# Patient Record
Sex: FEMALE | Race: BLACK OR AFRICAN AMERICAN | NOT HISPANIC OR LATINO | URBAN - METROPOLITAN AREA
[De-identification: names, ages, dates, MRNs, and addresses within clinical notes are randomized per-mention and may not be internally consistent; named-entity substitution may affect disease eponyms.]

---

## 2017-08-11 ENCOUNTER — HOSPITAL ENCOUNTER (OUTPATIENT)
Dept: ULTRASOUND IMAGING | Facility: CLINIC | Age: 27
Discharge: HOME | End: 2017-08-11
Attending: OTOLARYNGOLOGY | Admitting: OTOLARYNGOLOGY
Payer: COMMERCIAL

## 2017-08-11 DIAGNOSIS — Z32.01 POSITIVE PREGNANCY TEST: ICD-10-CM

## 2017-08-11 PROCEDURE — T1013 SIGN LANG/ORAL INTERPRETER: HCPCS | Mod: U3

## 2017-08-11 PROCEDURE — 76801 OB US < 14 WKS SINGLE FETUS: CPT

## 2017-08-24 ENCOUNTER — HOSPITAL ENCOUNTER (OUTPATIENT)
Facility: CLINIC | Age: 27
End: 2017-08-24
Payer: COMMERCIAL

## 2017-09-07 ENCOUNTER — TRANSFERRED RECORDS (OUTPATIENT)
Dept: HEALTH INFORMATION MANAGEMENT | Facility: CLINIC | Age: 27
End: 2017-09-07

## 2017-09-07 ENCOUNTER — MEDICAL CORRESPONDENCE (OUTPATIENT)
Dept: HEALTH INFORMATION MANAGEMENT | Facility: CLINIC | Age: 27
End: 2017-09-07

## 2017-09-08 ENCOUNTER — TELEPHONE (OUTPATIENT)
Dept: ENDOCRINOLOGY | Facility: CLINIC | Age: 27
End: 2017-09-08

## 2017-09-08 ENCOUNTER — DOCUMENTATION ONLY (OUTPATIENT)
Dept: ENDOCRINOLOGY | Facility: CLINIC | Age: 27
End: 2017-09-08

## 2017-09-08 NOTE — TELEPHONE ENCOUNTER
Referral no records received from [facility]  to be seen in Endocrinology  for High risk first trimester with  Nontoxic  Single thyroid nodule . Dr Cee will view for scheduling options.

## 2017-09-08 NOTE — TELEPHONE ENCOUNTER
----- Message from Cecilia Cee MD sent at 9/8/2017  1:35 PM CDT -----  Regarding: RE: Call pt- new referral from [facility]  Contact: [phone]  I have now reviewed what they sent us which doesn't include a physical exam, only the conclusion there is a thyroid nodule.  Would it be possible to contact them and ask them to get a a thyroid US and send us results to help us triage in the setting of our clinic booking out months?    Cecilia Cee       ----- Message -----     From: Cecilia Cee MD     Sent: 9/8/2017  12:25 PM       To: Cecilia Cee MD, #  Subject: FW: Call pt- new referral from [facility]             So sorry my mistake Dr Feliciano has now informed me he is the consult attending next week and he has blocked the Thursday  biopsy clinic as well as other clinics. This changes things for where this patient should be relative to my recommendaiton she got to [facility] biopsy clinic 9/14 AM.    Can you get me records to establish appropriate triage under the circumstances? Specifically if there has been an US or thyroid labs I would want to see those results.   Thanks    Cecilia Cee          ----- Message -----     From: Cecilia Cee MD     Sent: 9/8/2017  11:51 AM       To: Zelda Weathers, RN, #  Subject: RE: Call pt- new referral from [facility]               I am being told by Katie that he is on call next week so not doing the biopsy clinic .   ----- Message -----     From: Cecilia Cee MD     Sent: 9/8/2017  11:38 AM       To: Zelda Weathers, RN, #  Subject: RE: Call pt- new referral from [facility]             Schedule to [facility] biopsy clinic 9/14/17 .    ----- Message -----     From: Zelda Weathers RN     Sent: 9/8/2017  11:31 AM       To: Cecilia Cee MD  Subject: FW: Call pt- new referral from [facility]             New referral high risk  first  trimester and  Nontoxic single thyroid nodule  No records just the referral from  [facility]  ----- Message  -----     From: Red Wright     Sent: 9/8/2017  10:58 AM       To: [facility] Triage-  Subject: Call pt- new referral from [facility]                 Pt has a new referral to be seen for thyroid nodule, and she is pregnant. [facility] faxed the referral over referred by Alycia Rosenberg. Please review and call pt for scheduling. She can be reached at [phone]. Thank you!      GY    Please DO NOT send this message and/or reply back to sender.  Call Center Representatives DO NOT respond to messages.

## 2017-09-08 NOTE — PROGRESS NOTES
"Outside records reviewed for appt triage purposes    8/17/17: TSH 1.01    9/7/17 provider note states gestation 9 weeks 6 days.  There is no exam on the note.   The diagnosis is nontoxic autonomous thyroid nodule.   On the consult it says \"thyroid nodule on right side\".     Cecilia Cee MD  "

## 2017-09-08 NOTE — TELEPHONE ENCOUNTER
I spoke with referring clinic.  they will order a thyroid U/S and  Fax results for shceduling purpose

## 2017-09-08 NOTE — TELEPHONE ENCOUNTER
----- Message from Red Wright sent at 9/8/2017 10:58 AM CDT -----  Regarding: Call pt- new referral from [facility]  Contact: [phone]  Pt has a new referral to be seen for thyroid nodule, and she is pregnant. [facility] faxed the referral over referred by Alycia Rosenberg. Please review and call pt for scheduling. She can be reached at [phone]. Thank you!      GY    Please DO NOT send this message and/or reply back to sender.  Call Center Representatives DO NOT respond to messages.

## 2017-09-11 ENCOUNTER — TELEPHONE (OUTPATIENT)
Dept: ENDOCRINOLOGY | Facility: CLINIC | Age: 27
End: 2017-09-11

## 2017-09-11 NOTE — TELEPHONE ENCOUNTER
Braden/ Gautam  will be doing the biopsy Thursday at 10 AM as  the  was going to take the day off  to bring her in.

## 2017-09-11 NOTE — TELEPHONE ENCOUNTER
----- Message from Mirta Feliciano MD sent at 9/11/2017 12:20 PM CDT -----  Regarding: RE: BIOPSY CLINIC THURSDAY  That's fine.   Ally and I will be doing this biopsy.   Mirta Feliciano MD  [id number]  Endocrinology Service    ----- Message -----     From: Zelda Weathers RN     Sent: 9/11/2017  11:10 AM       To: Mirta Feliciano MD  Subject: BIOPSY CLINIC THURSDAY                           The  is planning on  bringing her Thursday at 10 AM for the biopsy clinic.  Miscommunication .  He took off work. I know  The clinic was cancelled  but since Jose is up  with  Schedule WED and Friday  this week we probably wont schedule any add ons for you . I know we won't .  Could you see her Thursday at 10 ?

## 2017-09-11 NOTE — TELEPHONE ENCOUNTER
----- Message from Katie Payan sent at 9/11/2017 12:55 PM CDT -----  ----- Message -----     From: Zelda Weathers, RN     Sent: 9/11/2017  12:48 PM       To: Katie Payan  Subject: FW: BIOPSY CLINIC THURSDAY                       Jennadanay brodie Florez will do the  Biopsy Thursday  10 AM  . Please schedule   ----- Message -----     From: Mirta Feliciano MD     Sent: 9/11/2017  12:20 PM       To: Zelda Waethers, RN  Subject: RE: BIOPSY CLINIC THURSDAY                       That's fine.   Ally and I will be doing this biopsy.   Mirta Feliciano MD  [id number]  Endocrinology Service    ----- Message -----     From: Zelad Weathers, RN     Sent: 9/11/2017  11:10 AM       To: Mirta Feliciano MD  Subject: BIOPSY CLINIC THURSDAY                           The  is planning on  bringing her Thursday at 10 AM for the biopsy clinic.    He took off work. Could you do  Thursday at 10 ?

## 2017-09-11 NOTE — TELEPHONE ENCOUNTER
Jeni  spouse will bring her here  Thursday  10 AM   to Dr Feliciano biopsy clinic   Per Dr Feliciano.

## 2017-09-14 ENCOUNTER — OFFICE VISIT (OUTPATIENT)
Dept: ENDOCRINOLOGY | Facility: CLINIC | Age: 27
End: 2017-09-14

## 2017-09-14 VITALS
HEART RATE: 76 BPM | HEIGHT: 67 IN | WEIGHT: 110.2 LBS | DIASTOLIC BLOOD PRESSURE: 69 MMHG | BODY MASS INDEX: 17.3 KG/M2 | SYSTOLIC BLOOD PRESSURE: 103 MMHG

## 2017-09-14 DIAGNOSIS — E04.1 THYROID NODULE: ICD-10-CM

## 2017-09-14 DIAGNOSIS — E04.1 THYROID NODULE: Primary | ICD-10-CM

## 2017-09-14 LAB
T3 SERPL-MCNC: 120 NG/DL (ref 60–181)
T4 FREE SERPL-MCNC: 1.24 NG/DL (ref 0.76–1.46)
TSH SERPL DL<=0.005 MIU/L-ACNC: 0.35 MU/L (ref 0.4–4)

## 2017-09-14 PROCEDURE — 86376 MICROSOMAL ANTIBODY EACH: CPT | Performed by: INTERNAL MEDICINE

## 2017-09-14 PROCEDURE — 84480 ASSAY TRIIODOTHYRONINE (T3): CPT | Performed by: INTERNAL MEDICINE

## 2017-09-14 PROCEDURE — 86800 THYROGLOBULIN ANTIBODY: CPT | Performed by: INTERNAL MEDICINE

## 2017-09-14 RX ORDER — PRENATAL VIT/IRON FUM/FOLIC AC 27MG-0.8MG
1 TABLET ORAL DAILY
COMMUNITY

## 2017-09-14 ASSESSMENT — PAIN SCALES - GENERAL: PAINLEVEL: NO PAIN (0)

## 2017-09-14 NOTE — PROGRESS NOTES
Addendum:   TSH suppressed in mid trimester pregnancy with thyroid nodule 1.6 cm with microcal.   Will need FNA but with suppressed TSH, we will plan to follow the levels and obtain FNA if normal values are seen. Clinically euthyroid.     Mirta Feliciano MD  [id number]  Endocrinology Service  ===================================.     Endocrine Clinic Consult:     Reason for consult: Hypothyroidism  Date: September 14, 2017  Referring Physician: [facility]    HPI:   Jeni Aguila is a 27 year old female who presented with her  and 2-year-old son is seen for initial consultation.      Since currently 3 months pregnant and is here today for evaluation of thyroid nodule.    Jeni is otherwise healthy.  During routine visit for pregnancy, primary doctor thyroid nodule on right side and therefore was sent for referral. Prior to this, she had never noticed thyroid nodules.    There is no history of neck pain, goiter, difficulty swallowing, change in voice.   She denies having family history of thyroid cancer or goiter, no history of radiation exposure, thyroid autoimmunity. She is originally from [place] [facility] [place] and has not noted goiter to be endemic in the area.     Clinical symptom assessment:   Temperature: cold intolerance No. Feels warm in hot weather.    General:  No,  Normal energy levels  Sleep  Is normal.   Weight gain No  Neurological: Difficult with concentration / somnolence: None  CVS: Palpitation No  History of CAD No  Lipid disorder No    Respiratory   Shortness of breath No    GI   Constipation No    Skin and hair:  No changes.    Extremity Swelling No  Menstrual irregularity:   Last menstrual period was about 3 months ago  Musculoskeletal:  No pain or cramps    Other ROS:   No eye symptoms  No headache, change in vision, loss of peripheral vision  Complete ROS that were obtained were negative.     Past medical history   no significant past medical  Illnesses   no surgeries     family history   no family  "history of thyroid disease or autoimmune diseases   no history of thyroid cancer in the family     Social history  She has children aged 10, 7, 2.  She lives with her .   She does not smoke or drink.    No past medical history on file.  No past surgical history on file.  No family history on file.  Social History     Social History     Marital status:      Spouse name: N/A     Number of children: N/A     Years of education: N/A     Occupational History     Not on file.     Social History Main Topics     Smoking status: Not on file     Smokeless tobacco: Not on file     Alcohol use Not on file     Drug use: Not on file     Sexual activity: Not on file     Other Topics Concern     Not on file     Social History Narrative     No narrative on file      No Known Allergies  Current Outpatient Prescriptions   Medication     Prenatal Vit-Fe Fumarate-FA (PRENATAL MULTIVITAMIN PLUS IRON) 27-0.8 MG TABS per tablet     No current facility-administered medications for this visit.            Exam:  /69  Pulse 76  Ht 1.702 m (5' 7\")  Wt 50 kg (110 lb 3.2 oz)  BMI 17.26 kg/m2   Constitutional:   thin female, no acute distress.   Head: Normocephalic. No masses, lesions, tenderness or abnormalities   eyes:    Extraocular movements are intact.  Peripheral vision grossly intact.   Neck:  No cervical lymphadenopathy, thyroid exam showed right-sided 1.5 cm nodule, firm, Carotids without bruits.  ENT: No throat congestion, no neck nodes or sinus tenderness  Cardiovascular: regular rhythm, no tachycardia  Respiratory: Lungs clear  Gastrointestinal: Abdomen soft, non-tender. BS normal. No striae  Musculoskeletal: gait normal and normal muscle tone  Neurologic: Normal speech, reflexes normal.   Psychiatric: mentation appears normal       TSH   Date Value Ref Range Status   09/14/2017 0.35 (L) 0.40 - 4.00 mU/L Final       T4 Free   Date Value Ref Range Status   09/14/2017 1.24 0.76 - 1.46 ng/dL Final   ]     thyroid " ultrasound:  I reviewed the images.   Isoechoic; 1.6 cm nodule on the right inferior nodule.   Well demarcated. Partially cystic.   Possible microcal and grade 3 vascularity  Intermediate suspicion.     Head and neck ultrasound did not show  Any evidence of  Abnormal lymphadenopathy.     Assessment      Multiple thyroid nodule with dominant right sided nodule with possible microcalcifications.   Abnormal TFTs -- suppressed TSH    Jeni Aguila is a 27 year old years old female who is here for evaluation of  Right-sided thyroid nodule.  This was found during a physical examination.   Clinically, she is euthyroid. We obtained a thyroid function test that showed mildly suppressed TSH with normal TT3 and FT4. During pregnancy, it is unclear whether this would represent subclinical hyperthyroidism.     She was found to have with thyroid nodule with intermediate suspicion for thyroid cancer.  We discussed about treatment nodules including biopsy.  However, there is data that suggests that if we delay management of nodules until after delivery, the outcomes are not different. We discussed the risk of cancers in thyroid nodules and having biopsy as next steps (prior to getting US)     We will discuss the findings of thyroid ultrasound and plan for next steps based on patient's preference. Given the low TSH, and reported 12 week + pregnancy, we will follow patient closely and likely defer FNA until after delivery. The reason is to rule out toxic nodule.     We discussed about thyroid nodules and management in great detail. Majority of thyroid nodules are benign. Majority of thyroid cancers are slow growing and have good prognosis but a small number tend to be aggressive.   Thyroid FNA for further evaluation of larger nodules is next best step.     Mirta Feliciano MD  [id number]  Endocrinology Service      Patient Instructions   Lab testing today, we will follow up with the results.     Please schedule for thyroid ultrasound.               Orders Placed This Encounter   Procedures     US Thyroid     US Head Neck Soft Tissue     TSH with free T4 reflex     T4 free     T3 total     Thyroid peroxidase antibody     Anti thyroglobulin antibody

## 2017-09-14 NOTE — LETTER
9/14/2017       RE: Jeni Aguila  [address]  [facility] [address]     Dear Colleague,    Thank you for referring your patient, Jeni Aguila, to the [facility] ENDOCRINOLOGY at [facility]. Please see a copy of my visit note below.    9 7 2 child age.     Endocrine Clinic Consult:     Reason for consult: Hypothyroidism  Date: September 14, 2017  Referring Physician: [facility]    HPI:   Jeni Aguila is a 27 year old female who presented with her  and 2-year-old son is seen for initial consultation.      Since currently 3 months pregnant and is here today for evaluation of thyroid nodule.    Jeni is otherwise healthy.  During routine visit for pregnancy, primary doctor thyroid nodule on right side and therefore was sent for referral. Prior to this, she had never noticed thyroid nodules.    There is no history of neck pain, goiter, difficulty swallowing, change in voice.   She denies having family history of thyroid cancer or goiter, no history of radiation exposure, thyroid autoimmunity. She is originally from [facility] [place] and has not noted goiter to be endemic in the area.     Clinical symptom assessment:   Temperature: cold intolerance No. Feels warm in hot weather.    General:  No,  Normal energy levels  Sleep  Is normal.   Weight gain No  Neurological: Difficult with concentration / somnolence: None  CVS: Palpitation No  History of CAD No  Lipid disorder No    Respiratory   Shortness of breath No    GI   Constipation No    Skin and hair:  No changes.    Extremity Swelling No  Menstrual irregularity:   Last menstrual period was about 3 months ago  Musculoskeletal:  No pain or cramps    Other ROS:   No eye symptoms  No headache, change in vision, loss of peripheral vision  Complete ROS that were obtained were negative.     Past medical history   no significant past medical  Illnesses   no surgeries     family history   no family history of thyroid disease or  "autoimmune diseases   no history of thyroid cancer in the family     Social history  She has children aged 10, 7, 2.  She lives with her .   She does not smoke or drink.    No past medical history on file.  No past surgical history on file.  No family history on file.  Social History     Social History     Marital status:      Spouse name: N/A     Number of children: N/A     Years of education: N/A     Occupational History     Not on file.     Social History Main Topics     Smoking status: Not on file     Smokeless tobacco: Not on file     Alcohol use Not on file     Drug use: Not on file     Sexual activity: Not on file     Other Topics Concern     Not on file     Social History Narrative     No narrative on file      No Known Allergies  Current Outpatient Prescriptions   Medication     Prenatal Vit-Fe Fumarate-FA (PRENATAL MULTIVITAMIN PLUS IRON) 27-0.8 MG TABS per tablet     No current facility-administered medications for this visit.      Exam:  /69  Pulse 76  Ht 1.702 m (5' 7\")  Wt 50 kg (110 lb 3.2 oz)  BMI 17.26 kg/m2   Constitutional:   thin female, no acute distress.   Head: Normocephalic. No masses, lesions, tenderness or abnormalities   eyes:    Extraocular movements are intact.  Peripheral vision grossly intact.   Neck:  No cervical lymphadenopathy, thyroid exam showed right-sided 1.5 cm nodule, firm, Carotids without bruits.  ENT: No throat congestion, no neck nodes or sinus tenderness  Cardiovascular: regular rhythm, no tachycardia  Respiratory: Lungs clear  Gastrointestinal: Abdomen soft, non-tender. BS normal. No striae  Musculoskeletal: gait normal and normal muscle tone  Neurologic: Normal speech, reflexes normal.   Psychiatric: mentation appears normal     TSH   Date Value Ref Range Status   09/14/2017 0.35 (L) 0.40 - 4.00 mU/L Final     T4 Free   Date Value Ref Range Status   09/14/2017 1.24 0.76 - 1.46 ng/dL Final      thyroid ultrasound:  I reviewed the images. "   Isoechoic; 1.6 cm nodule on the right inferior nodule.   Well demarcated. Partially cystic.   Possible microcal and grade 3 vascularity  Intermediate suspicion.     Head and neck ultrasound did not show  Any evidence of  Abnormal lymphadenopathy.     Assessment      Multiple thyroid nodule with dominant right sided nodule with possible microcalcifications.   Abnormal TFTs -- suppressed TSH    Jeni Aguila is a 27 year old years old female who is here for evaluation of  Right-sided thyroid nodule.  This was found during a physical examination.   Clinically, she is euthyroid. We obtained a thyroid function test that showed mildly suppressed TSH with normal TT3 and FT4. During pregnancy, it is unclear whether this would represent subclinical hyperthyroidism.     She was found to have with thyroid nodule with intermediate suspicion for thyroid cancer.  We discussed about treatment nodules including biopsy.  However, there is data that suggests that if we delay management of nodules until after delivery, the outcomes are not different. We discussed the risk of cancers in thyroid nodules and having biopsy as next steps (prior to getting US)     We will discuss the findings of thyroid ultrasound and plan for next steps based on patient's preference. Given the low TSH, and reported 12 week + pregnancy, we will follow patient closely and likely defer FNA until after delivery. The reason is to rule out toxic nodule.     We discussed about thyroid nodules and management in great detail. Majority of thyroid nodules are benign. Majority of thyroid cancers are slow growing and have good prognosis but a small number tend to be aggressive.   Thyroid FNA for further evaluation of larger nodules is next best step.     Mirta Feliciano MD  [id number]  Endocrinology Service    Patient Instructions   Lab testing today, we will follow up with the results.     Please schedule for thyroid ultrasound.              Orders Placed This Encounter    Procedures     US Thyroid     US Head Neck Soft Tissue     TSH with free T4 reflex     T4 free     T3 total     Thyroid peroxidase antibody     Anti thyroglobulin antibody       Again, thank you for allowing me to participate in the care of your patient.      Sincerely,    Mirta Feliciano MD

## 2017-09-14 NOTE — MR AVS SNAPSHOT
After Visit Summary   2017    Jeni Aguila    MRN: [id number]           Patient Information     Date Of Birth          1990        Visit Information        Provider Department      2017 10:00 AM Maria Esther Lozoya Rupendra Dev Thaku, MD [facility]        Today's Diagnoses     Thyroid nodule    -  1      Care Instructions    Lab testing today, we will follow up with the results.     Please schedule for thyroid ultrasound.               Follow-ups after your visit        Follow-up notes from your care team     Return in about 3 months (around 2017).      Who to contact     Please call your clinic at [phone] to:    Ask questions about your health    Make or cancel appointments    Discuss your medicines    Learn about your test results    Speak to your doctor   If you have compliments or concerns about an experience at your clinic, or if you wish to file a complaint, please contact [facility] Patient Relations at [phone] or email us at [email or web address]         Additional Information About Your Visit        MyChart Information     [employer] is an electronic gateway that provides easy, online access to your medical records. With [employer], you can request a clinic appointment, read your test results, renew a prescription or communicate with your care team.     To sign up for Mojeek visit the website at [email or web address]   You will be asked to enter the access code listed below, as well as some personal information. Please follow the directions to create your username and password.     Your access code is: [email or web address]  Expires: 2017  6:30 AM     Your access code will  in 90 days. If you need help or a new code, please contact your [facility] or call [phone] for assistance.        Care EveryWhere ID     This is your Care EveryWhere ID. This could be used by other  "organizations to access your [facility] medical records  [id number]        Your Vitals Were     Pulse Height BMI (Body Mass Index)             76 1.702 m (5' 7\") 17.26 kg/m2          Blood Pressure from Last 3 Encounters:   09/14/17 103/69    Weight from Last 3 Encounters:   09/14/17 50 kg (110 lb 3.2 oz)               Primary Care Provider Office Phone # Fax #    Clinic [facility] [phone]       [address] [facility] [address]        Equal Access to Services     CATIE WRIGHT : Hadii aad ku hadasho Soomaali, waaxda luqadaha, qaybta kaalmada adeegyada, waxay idiin hayaajuliana overton . So [facility] [phone].    ATENCIÓN: Si habla español, tiene a del rosario disposición servicios gratuitos de asistencia lingüística. Llame al [phone].    We comply with applicable federal civil rights laws and [place] laws. We do not discriminate on the basis of race, color, national origin, age, disability sex, sexual orientation or gender identity.            Thank you!     Thank you for choosing [facility] ENDOCRINOLOGY  for your care. Our goal is always to provide you with excellent care. Hearing back from our patients is one way we can continue to improve our services. Please take a few minutes to complete the written survey that you may receive in the mail after your visit with us. Thank you!             Your Updated Medication List - Protect others around you: Learn how to safely use, store and throw away your medicines at www.disposemymeds.org.          This list is accurate as of: 9/14/17  5:00 PM.  Always use your most recent med list.                   Brand Name Dispense Instructions for use Diagnosis    prenatal multivitamin plus iron 27-0.8 MG Tabs per tablet      Take 1 tablet by mouth daily          "

## 2017-09-14 NOTE — PATIENT INSTRUCTIONS
Lab testing today, we will follow up with the results.     Please schedule for thyroid ultrasound.

## 2017-09-15 LAB
THYROGLOB AB SERPL IA-ACNC: <20 IU/ML (ref 0–40)
THYROPEROXIDASE AB SERPL-ACNC: <10 IU/ML

## 2018-04-03 ENCOUNTER — THERAPY VISIT (OUTPATIENT)
Dept: PHYSICAL THERAPY | Facility: CLINIC | Age: 28
End: 2018-04-03
Payer: COMMERCIAL

## 2018-04-03 DIAGNOSIS — M54.2 CERVICALGIA: Primary | ICD-10-CM

## 2018-04-03 PROCEDURE — 97530 THERAPEUTIC ACTIVITIES: CPT | Mod: GP | Performed by: PHYSICAL THERAPIST

## 2018-04-03 PROCEDURE — 97110 THERAPEUTIC EXERCISES: CPT | Mod: GP | Performed by: PHYSICAL THERAPIST

## 2018-04-03 PROCEDURE — 97161 PT EVAL LOW COMPLEX 20 MIN: CPT | Mod: GP | Performed by: PHYSICAL THERAPIST

## 2018-04-03 NOTE — LETTER
[facility]  [address] [place] [id number] [phone]    2018    Re: Heike Musa   :   1990  MRN:  [id number]   REFERRING PHYSICIAN:   Gina Sanchez    [facility]  Date of Initial Evaluation: 4/3/18  Visits:  Rxs Used: 1  Reason for Referral:  Cervicalgia    Physical Therapy Initial Examination/Evaluation April 3, 2018   Heike Musa is a 28 year old female referred to physical therapy by Dr. Gina Sanchez for treatment of Back pain with Precautions/Restrictions/MD instructions E&T; Upper back pain since , long periods of neck flexion at work    Therapist Assessment:   Clinical Impression: Pt presents to [facility] [place] with primary complaint of neck pain.  Per clinical examination, pt with pain at end range of cervical motion and demonstrates postural dysfunction.   Pt will benefit from skilled physical therapy for stretching and postural strengthening program as well as education on ergonomics for work station.   Subjective: Pt reports pain is present around front of chest, wrapping around to mid thoracic area. Pain has been present since . 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 Pain is common between shoulder blades  DOI/onset: MD order 3/5/2018   Mechanism of injury: prolonged neck flexion with job   DOS NA   Related PMH: chronic neck pain  Previous treatment: PT   Imaging: none   Chief Complaint: Neck Pain    Pain: rest 6 /10, activity 8/10  Described as: Steady,  Sharp Alleviated by: heat pack Exacerbated by: Looking down (like on phone) or on computer, lifting Progression of symptoms since initial onset: Staying the same or worsening Time of day when pain is worse: Later in the day with fatigue   Sleeping: Does not wake from pain    Social history: Lives with parents & siter  Occupation: none due to pain; may return to job pending  symptoms Job duties: household tasks   Current HEP/exercise regimen: some neck stretching, small leg exercises   Re: Heike Musa   :   1990    Patient's goals are Decrease pain, go back to work    Other pertinent PMH: none noted General health as reported by patient: Good   Return to MD: prn      Cervical Spine Evaluation  Posture  Forward Head: WNL  Rounded Shoulders: present  Thoracic Spine:Decreased curvature    Range of Motion  Flexion: WNL with pain   Extension: 48 pain   Sidebend Left: 53  Right: 34  Rotation Left: 68  Right: 57  Protraction: head rests in this position   Retraction: Good motion with repeated movement  Upper extremity screen: WNL     Upper Extremity Strength  Shoulder MMT Flexion Sc[facility]ion ER IR   Left 4/5 4/5 4/5 4/5   Right 4/5 4/5 4/5 4/5     Special Tests  Spurling's: slight increased pain   Dermatomes: WNL  Myotomes: WNL    Palpation  Pain along upper trap and cervical paraspinals    Assessment/Plan:  Patient is a 28 year old female with cervical complaints.    Patient has the following significant findings with corresponding treatment plan.                Diagnosis 1:  Neck pain   Pain -  hot/cold therapy, manual therapy, self management, education and home program  Decreased strength - therapeutic exercise, therapeutic activities and home program  Impaired muscle performance - neuro re-education and home program  Decreased function - therapeutic activities and home program  Impaired posture - neuro re-education, therapeutic activities and home program    Therapy Evaluation Codes:   1) History comprised of:   Personal factors that impact the plan of care:      Living environment, Past/current experiences and Time since onset of symptoms.    Comorbidity factors that impact the plan of care are:      Weakness.     Medications impacting care: None.          Re: Heike Musa   :   1990    2) Examination of Body Systems comprised of:   Body structures and functions that impact the  plan of care:      Cervical spine and Thoracic Spine.   Activity limitations that impact the plan of care are:      Bathing, Cooking, Dressing, Lifting, Reading/Computer work and Working.  3) Clinical presentation characteristics are:   Stable/Uncomplicated.  4) Decision-Making    Low complexity using standardized patient assessment instrument and/or measureable assessment of functional outcome.  Cumulative Therapy Evaluation is: Low complexity.    Previous and current functional limitations:  (See Goal Flow Sheet for this information)    Short term and Long term goals: (See Goal Flow Sheet for this information)     Communication ability:  Patient has an  for communication clarity.  Treatment Explanation - The following has been discussed with the patient:   RX ordered/plan of care  Anticipated outcomes  Possible risks and side effects  This patient would benefit from PT intervention to resume normal activities.   Rehab potential is good.    Frequency:  1 X week, once daily  Duration:  for 6 weeks  Discharge Plan:  Achieve all LTG.  Independent in home treatment program.  Reach maximal therapeutic benefit.    Please refer to the daily flowsheet for treatment today, total treatment time and time spent performing 1:1 timed codes.     Thank you for your referral.    INQUIRIES  Therapist: Liane Loo, PT, DPT   [facility] [address] [address]  Phone: [phone]  Fax: [phone]

## 2018-04-03 NOTE — PROGRESS NOTES
HPI  System  Physical Exam  General   ROS      Physical Therapy Initial Examination/Evaluation April 3, 2018   Heike Musa is a 28 year old female referred to physical therapy by Dr. Gina Sanchez for treatment of Back pain with Precautions/Restrictions/MD instructions E&T; Upper back pain since 2014, long periods of neck flexion at work    Therapist Assessment:   Clinical Impression: Pt presents to [facility] [place] with primary complaint of neck pain.  Per clinical examination, pt with pain at end range of cervical motion and demonstrates postural dysfunction.   Pt will benefit from skilled physical therapy for stretching and postural strengthening program as well as education on ergonomics for work station.      Subjective: Pt reports pain is present around front of chest, wrapping around to mid thoracic area. Pain has been present since 2014. 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 Pain is common between shoulder blades  DOI/onset: MD order 3/5/2018   Mechanism of injury: prolonged neck flexion with job   DOS NA   Related PMH: chronic neck pain  Previous treatment: PT   Imaging: none   Chief Complaint: Neck Pain    Pain: rest 6 /10, activity 8/10  Described as: Steady,  Sharp Alleviated by: heat pack Exacerbated by: Looking down (like on phone) or on computer, lifting Progression of symptoms since initial onset: Staying the same or worsening Time of day when pain is worse: Later in the day with fatigue   Sleeping: Does not wake from pain    Social history: Lives with parents & siter  Occupation: none due to pain; may return to job pending symptoms Job duties: household tasks   Current HEP/exercise regimen: some neck stretching, small leg exercises   Patient's goals are Decrease pain, go back to work    Other pertinent PMH: none noted General health as reported by patient: Good   Return to MD: prn      Cervical Spine  Evaluation    Posture  Forward Head: WNL  Rounded Shoulders: present  Thoracic Spine:Decreased curvature    Range of Motion  Flexion: WNL with pain   Extension: 48 pain   Sidebend Left: 53  Right: 34  Rotation Left: 68  Right: 57  Protraction: head rests in this position   Retraction: Good motion with repeated movement  Upper extremity screen: WNL         Upper Extremity Strength  Shoulder MMT Flexion Scaption ER IR   Left 4/5 4/5 4/5 4/5   Right 4/5 4/5 4/5 4/5     Special Tests  Spurling's: slight increased pain   Dermatomes: WNL  Myotomes: WNL      Palpation  Pain along upper trap and cervical paraspinals    Assessment/Plan:  Patient is a 28 year old female with cervical complaints.    Patient has the following significant findings with corresponding treatment plan.                Diagnosis 1:  Neck pain   Pain -  hot/cold therapy, manual therapy, self management, education and home program  Decreased strength - therapeutic exercise, therapeutic activities and home program  Impaired muscle performance - neuro re-education and home program  Decreased function - therapeutic activities and home program  Impaired posture - neuro re-education, therapeutic activities and home program    Therapy Evaluation Codes:   1) History comprised of:   Personal factors that impact the plan of care:      Living environment, Past/current experiences and Time since onset of symptoms.    Comorbidity factors that impact the plan of care are:      Weakness.     Medications impacting care: None.  2) Examination of Body Systems comprised of:   Body structures and functions that impact the plan of care:      Cervical spine and Thoracic Spine.   Activity limitations that impact the plan of care are:      Bathing, Cooking, Dressing, Lifting, Reading/Computer work and Working.  3) Clinical presentation characteristics are:   Stable/Uncomplicated.  4) Decision-Making    Low complexity using standardized patient assessment instrument and/or  measureable assessment of functional outcome.  Cumulative Therapy Evaluation is: Low complexity.    Previous and current functional limitations:  (See Goal Flow Sheet for this information)    Short term and Long term goals: (See Goal Flow Sheet for this information)     Communication ability:  Patient has an  for communication clarity.  Treatment Explanation - The following has been discussed with the patient:   RX ordered/plan of care  Anticipated outcomes  Possible risks and side effects  This patient would benefit from PT intervention to resume normal activities.   Rehab potential is good.    Frequency:  1 X week, once daily  Duration:  for 6 weeks  Discharge Plan:  Achieve all LTG.  Independent in home treatment program.  Reach maximal therapeutic benefit.    Please refer to the daily flowsheet for treatment today, total treatment time and time spent performing 1:1 timed codes.

## 2018-04-13 ENCOUNTER — THERAPY VISIT (OUTPATIENT)
Dept: PHYSICAL THERAPY | Facility: CLINIC | Age: 28
End: 2018-04-13
Payer: COMMERCIAL

## 2018-04-13 DIAGNOSIS — M54.2 CERVICALGIA: ICD-10-CM

## 2018-04-13 PROCEDURE — 97110 THERAPEUTIC EXERCISES: CPT | Mod: GP | Performed by: PHYSICAL THERAPIST

## 2018-04-13 PROCEDURE — 97140 MANUAL THERAPY 1/> REGIONS: CPT | Mod: GP | Performed by: PHYSICAL THERAPIST

## 2018-04-20 ENCOUNTER — THERAPY VISIT (OUTPATIENT)
Dept: PHYSICAL THERAPY | Facility: CLINIC | Age: 28
End: 2018-04-20
Payer: COMMERCIAL

## 2018-04-20 DIAGNOSIS — M54.2 CERVICALGIA: ICD-10-CM

## 2018-04-20 PROCEDURE — 97110 THERAPEUTIC EXERCISES: CPT | Mod: GP | Performed by: PHYSICAL THERAPIST

## 2018-04-20 PROCEDURE — 97140 MANUAL THERAPY 1/> REGIONS: CPT | Mod: GP | Performed by: PHYSICAL THERAPIST

## 2018-04-20 PROCEDURE — 97112 NEUROMUSCULAR REEDUCATION: CPT | Mod: GP | Performed by: PHYSICAL THERAPIST

## 2018-07-30 ENCOUNTER — HOSPITAL ENCOUNTER (EMERGENCY)
Facility: CLINIC | Age: 28
Discharge: HOME | End: 2018-07-30
Attending: EMERGENCY MEDICINE | Admitting: EMERGENCY MEDICINE
Payer: COMMERCIAL

## 2018-07-30 ENCOUNTER — APPOINTMENT (OUTPATIENT)
Dept: GENERAL RADIOLOGY | Facility: CLINIC | Age: 28
End: 2018-07-30
Attending: EMERGENCY MEDICINE
Payer: COMMERCIAL

## 2018-07-30 VITALS
WEIGHT: 131.06 LBS | OXYGEN SATURATION: 98 % | RESPIRATION RATE: 16 BRPM | SYSTOLIC BLOOD PRESSURE: 107 MMHG | HEART RATE: 55 BPM | TEMPERATURE: 97.9 F | DIASTOLIC BLOOD PRESSURE: 66 MMHG

## 2018-07-30 DIAGNOSIS — M54.9 MUSCULOSKELETAL BACK PAIN: ICD-10-CM

## 2018-07-30 LAB
ALBUMIN UR-MCNC: NEGATIVE MG/DL
APPEARANCE UR: CLEAR
BILIRUB UR QL STRIP: NEGATIVE
COLOR UR AUTO: YELLOW
GLUCOSE UR STRIP-MCNC: NEGATIVE MG/DL
HCG UR QL: NEGATIVE
HGB UR QL STRIP: ABNORMAL
KETONES UR STRIP-MCNC: NEGATIVE MG/DL
LEUKOCYTE ESTERASE UR QL STRIP: ABNORMAL
NITRATE UR QL: NEGATIVE
PH UR STRIP: 5.5 PH (ref 5–7)
RBC #/AREA URNS AUTO: 128 /HPF (ref 0–2)
SOURCE: ABNORMAL
SP GR UR STRIP: 1 (ref 1–1.03)
SQUAMOUS #/AREA URNS AUTO: <1 /HPF (ref 0–1)
UROBILINOGEN UR STRIP-MCNC: NORMAL MG/DL (ref 0–2)
WBC #/AREA URNS AUTO: 10 /HPF (ref 0–5)

## 2018-07-30 PROCEDURE — 81025 URINE PREGNANCY TEST: CPT | Performed by: EMERGENCY MEDICINE

## 2018-07-30 PROCEDURE — 99284 EMERGENCY DEPT VISIT MOD MDM: CPT | Mod: Z6 | Performed by: EMERGENCY MEDICINE

## 2018-07-30 PROCEDURE — 99284 EMERGENCY DEPT VISIT MOD MDM: CPT | Mod: 25 | Performed by: EMERGENCY MEDICINE

## 2018-07-30 PROCEDURE — 25000132 ZZH RX MED GY IP 250 OP 250 PS 637: Performed by: EMERGENCY MEDICINE

## 2018-07-30 PROCEDURE — 71046 X-RAY EXAM CHEST 2 VIEWS: CPT

## 2018-07-30 PROCEDURE — 81001 URINALYSIS AUTO W/SCOPE: CPT | Performed by: EMERGENCY MEDICINE

## 2018-07-30 RX ORDER — CYCLOBENZAPRINE HCL 10 MG
10 TABLET ORAL ONCE
Status: COMPLETED | OUTPATIENT
Start: 2018-07-30 | End: 2018-07-30

## 2018-07-30 RX ORDER — HYDROCODONE BITARTRATE AND ACETAMINOPHEN 5; 325 MG/1; MG/1
1 TABLET ORAL EVERY 6 HOURS PRN
Qty: 12 TABLET | Refills: 0 | Status: SHIPPED | OUTPATIENT
Start: 2018-07-30 | End: 2019-12-10

## 2018-07-30 RX ORDER — CYCLOBENZAPRINE HCL 10 MG
10 TABLET ORAL 2 TIMES DAILY PRN
Qty: 12 TABLET | Refills: 0 | Status: SHIPPED | OUTPATIENT
Start: 2018-07-30 | End: 2018-08-05

## 2018-07-30 RX ADMIN — CYCLOBENZAPRINE HYDROCHLORIDE 10 MG: 10 TABLET, FILM COATED ORAL at 11:11

## 2018-07-30 ASSESSMENT — ENCOUNTER SYMPTOMS
ABDOMINAL PAIN: 0
SHORTNESS OF BREATH: 0
COUGH: 0
BACK PAIN: 1
FEVER: 0
CHILLS: 0

## 2018-07-30 NOTE — ED AVS SNAPSHOT
[facility], Emergency Department    [address]    [place] [address]    Phone:  [phone]    Fax:  [phone]                                       Heike Musa   MRN: [id number]    Department:  [facility], Emergency Department   Date of Visit:  7/30/2018           Patient Information     Date Of Birth          1990        Your diagnoses for this visit were:     Musculoskeletal back pain        You were seen by Herminia Elizalde MD.        Discharge Instructions       You can take flexeril for muscle spasms and pain.     You can take vicodin for pain.  Do not take with tylenol since there is tylenol in vicodin.     Please follow up with your primary care doctor in 1 week for recheck if not improved.       Relieving Back Pain  Back pain is a common problem. You can strain back muscles by lifting too much weight or just by moving the wrong way. Back strain can be uncomfortable, even painful. And it can take weeks or months to improve. To help yourself feel better and prevent future back strains, try these tips.  Important Note: Do not give aspirin to children or teens without first discussing it with your healthcare provider.      ? Ice    Ice reduces muscle pain and swelling. It helps most during the first 24 to 48 hours after an injury.    Wrap an ice pack or a bag of frozen peas in a thin towel. (Never place ice directly on your skin.)    Place the ice where your back hurts the most.    Don t ice for more than 20 minutes at a time.    You can use ice several times a day.  ? Medicines  Over-the-counter pain relievers can include acetaminophen and anti-inflammatory medicines, which includes aspirin or ibuprofen. They can help ease discomfort. Some also reduce swelling.    Tell your healthcare provider about any medicines you are already taking.    Take medicines only as directed.  ? Heat  After the first 48 hours, heat can relax sore muscles and improve blood flow.    Try a warm bath or shower. Or  use a heating pad set on low. To prevent a burn, keep a cloth between you and the heating pad.    Don t use a heating pad for more than 15 minutes at a time. Never sleep on a heating pad.  Date Last Reviewed: 9/1/2015 2000-2017 [employer]. [address] [place] [address]. All rights reserved. This information is not intended as a substitute for professional medical care. Always follow your healthcare professional's instructions.              24 Hour Appointment Hotline       To make an appointment at any [facility] [place], call [phone] ([phone]). If you don't have a family doctor or clinic, we will help you find one. [facility] clinics are conveniently located to serve the needs of you and your family.             Review of your medicines      START taking        Dose / Directions Last dose taken    cyclobenzaprine 10 MG tablet   Commonly known as:  FLEXERIL   Dose:  10 mg   Quantity:  12 tablet        Take 1 tablet (10 mg) by mouth 2 times daily as needed for muscle spasms   Refills:  0        HYDROcodone-acetaminophen 5-325 MG per tablet   Commonly known as:  NORCO   Dose:  1 tablet   Quantity:  12 tablet        Take 1 tablet by mouth every 6 hours as needed for severe pain   Refills:  0                Information about OPIOIDS     PRESCRIPTION OPIOIDS: WHAT YOU NEED TO KNOW   We gave you an opioid (narcotic) pain medicine. It is important to manage your pain, but opioids are not always the best choice. You should first try all the other options your care team gave you. Take this medicine for as short a time (and as few doses) as possible.     These medicines have risks:    DO NOT drive when on new or higher doses of pain medicine. These medicines can affect your alertness and reaction times, and you could be arrested for driving under the influence (DUI). If you need to use opioids long-term, talk to your care team about driving.    DO NOT operate heave machinery    DO NOT do  any other dangerous activities while taking these medicines.     DO NOT drink any alcohol while taking these medicines.      If the opioid prescribed includes acetaminophen, DO NOT take with any other medicines that contain acetaminophen. Read all labels carefully. Look for the word  acetaminophen  or  Tylenol.  Ask your pharmacist if you have questions or are unsure.    You can get addicted to pain medicines, especially if you have a history of addiction (chemical, alcohol or substance dependence). Talk to your care team about ways to reduce this risk.    Store your pills in a secure place, locked if possible. We will not replace any lost or stolen medicine. If you don t finish your medicine, please throw away (dispose) as directed by your pharmacist. The [place] [employer]ution [employer] has more information about safe disposal: https://www.pca.[facility].[place].us/living-green/managing-unwanted-medications.     All opioids tend to cause constipation. Drink plenty of water and eat foods that have a lot of fiber, such as fruits, vegetables, prune juice, apple juice and high-fiber cereal. Take a laxative (Miralax, milk of magnesia, Colace, Senna) if you don t move your bowels at least every other day.         Prescriptions were sent or printed at these locations (2 Prescriptions)                   Other Prescriptions                Printed at Department/Unit printer (2 of 2)         cyclobenzaprine (FLEXERIL) 10 MG tablet               HYDROcodone-acetaminophen (NORCO) 5-325 MG per tablet                Procedures and tests performed during your visit     HCG qualitative urine (UPT)    UA reflex to Microscopic    XR Chest 2 Views      Orders Needing Specimen Collection     None      Pending Results     No orders found from 7/28/2018 to 7/31/2018.            Pending Culture Results     No orders found from 7/28/2018 to 7/31/2018.            Pending Results Instructions     If you had any lab results that were not finalized  "at the time of your Discharge, you can call the ED Lab Result RN at [phone]. You will be contacted by this team for any positive Lab results or changes in treatment. The nurses are available 7 days a week from 10A to 6:30P.  You can leave a message 24 hours per day and they will return your call.        Thank you for choosing [facility]       Thank you for choosing [facility] for your care. Our goal is always to provide you with excellent care. Hearing back from our patients is one way we can continue to improve our services. Please take a few minutes to complete the written survey that you may receive in the mail after you visit with us. Thank you!        [employer] Information     [employer] lets you send messages to your doctor, view your test results, renew your prescriptions, schedule appointments and more. To sign up, go to [email or web address] . Click on \"Log in\" on the left side of the screen, which will take you to the Welcome page. Then click on \"Sign up Now\" on the right side of the page.     You will be asked to enter the access code listed below, as well as some personal information. Please follow the directions to create your username and password.     Your access code is: [email or web address]  Expires: 10/28/2018  1:20 PM     Your access code will  in 90 days. If you need help or a new code, please call your [facility] clinic or [phone].        Care EveryWhere ID     This is your Care EveryWhere ID. This could be used by other organizations to access your [facility] medical records  [id number]        Equal Access to Services     CHARLY GASTELUM : Hadluisito Jules, waaxda luqadaha, qaybta kaalmada vianney cordova. So [facility] [phone].    ATENCIÓN: Si habla español, tiene a bashir disposición servicios gratuitos de asistencia lingüística. Llame al [phone].    We comply with applicable federal civil rights laws and [place] laws. We do not discriminate on " the basis of race, color, national origin, age, disability, sex, sexual orientation, or gender identity.            After Visit Summary       This is your record. Keep this with you and show to your community pharmacist(s) and doctor(s) at your next visit.

## 2018-07-30 NOTE — ED PROVIDER NOTES
History     Chief Complaint   Patient presents with     Back Pain     Mid bilateral back pain for 3 days.  Denies trauma     The history is provided by the patient. The history is limited by a language barrier. A  was used (Official [employer] I-pad ).     Heike Musa is a 28 year old female with a history of back pain who presents for acute on chronic back pain. Patient complains she has had sharp diffuse back pain for the past three days. She denies doing any heavy lifting, or other trauma/injury that could have exacerbated her back. She reports she has had similar back pain in the past, intermittently over the past 4 years, for which she has been through physical therapy and has been prescribed muscle relaxants. Patient reports her those interventions relieved her pain temporarily, however, her back pain has once again recurred. Her pain makes her want to cough, but she does not have a cough. She denies chest pain, shortness of breath, fever, chills, cold-type symptoms, or abdominal pain. No numbness, tingling, or weakness of the extremities. No loss of bowel or bladder control. No urinary symptoms or changes in bowel movements. She did take 1-2 tablets of ibuprofen for her pain yesterday without improvement. She denies any chance of pregnancy. No history of surgery.  She is currently menstruating.     I have reviewed the Medications, Allergies, Past Medical and Surgical History, and Social History in the [employer] system.  Past Medical History:   Diagnosis Date     Back pain        History reviewed. No pertinent surgical history.    No family history on file.    Social History   Substance Use Topics     Smoking status: Never Smoker     Smokeless tobacco: Never Used     Alcohol use No       No current facility-administered medications for this encounter.      Current Outpatient Prescriptions   Medication     cyclobenzaprine (FLEXERIL) 10 MG tablet     HYDROcodone-acetaminophen (NORCO)  5-325 MG per tablet      Allergies no known allergies    Review of Systems   Constitutional: Negative for chills and fever.   Respiratory: Negative for cough and shortness of breath.    Cardiovascular: Negative for chest pain.   Gastrointestinal: Negative for abdominal pain.   Musculoskeletal: Positive for back pain.   All other systems reviewed and are negative.      Physical Exam   BP: 131/77  Pulse: 66  Temp: 98.2  F (36.8  C)  Resp: 16  Weight: 59.4 kg (131 lb 1 oz)  SpO2: 100 %      Physical Exam   Constitutional: She is oriented to person, place, and time. She appears well-developed and well-nourished. No distress.   HENT:   Head: Normocephalic and atraumatic.   Right Ear: External ear normal.   Left Ear: External ear normal.   Nose: Nose normal.   Mouth/Throat: Oropharynx is clear and moist.   Eyes: EOM are normal. Pupils are equal, round, and reactive to light.   Neck: Normal range of motion. Neck supple.   Cardiovascular: Normal rate, regular rhythm, normal heart sounds and intact distal pulses.    Pulmonary/Chest: Effort normal and breath sounds normal.   Abdominal: Soft. Bowel sounds are normal.   Musculoskeletal:   paraspinous tenderness on exam.  Muscle feels tight.    Neurological: She is alert and oriented to person, place, and time.   Skin: Skin is warm and dry. She is not diaphoretic.   Psychiatric: She has a normal mood and affect.   Nursing note and vitals reviewed.      ED Course     ED Course     Procedures       10:30 AM  The patient was seen and examined by Dr. Elizalde in Room 18.        Labs Ordered and Resulted from Time of ED Arrival Up to the Time of Departure from the ED   URINE MACROSCOPIC WITH REFLEX TO MICRO - Abnormal; Notable for the following:        Result Value    Blood Urine Large (*)     Leukocyte Esterase Urine Trace (*)     RBC Urine 128 (*)     WBC Urine 10 (*)     All other components within normal limits   HCG QUALITATIVE URINE            Assessments & Plan (with Medical  Decision Making)   The patient presents for concerns of recurrent back pain which she has had on an off frequently. She says she has used muscle relaxants for awhile which help but then it always comes back. She denies any recent cause for the pain.  She says the pain makes her want to cough but she doesn't have a cough.  She is very concerned and what to know why she has this.  It is very difficult to reassure her.  Her lung exam and sats are normal.  She was given flexeril 10 mg po but she still has discomfort.  UA was checked.  She is not pregnant and does not appear to have a uti.  However, there is blood present but she is having her menses.  She is 0 for Wells criteria, making PE unlikely.  CXR was done and shows no cause for her pain, despite her continued concerns. I tried to reassure her.  I will discharge her home with meds for muscle pain and pmd follow up if needed.      I have reviewed the nursing notes.    I have reviewed the findings, diagnosis, plan and need for follow up with the patient.    Discharge Medication List as of 7/30/2018  1:20 PM      START taking these medications    Details   cyclobenzaprine (FLEXERIL) 10 MG tablet Take 1 tablet (10 mg) by mouth 2 times daily as needed for muscle spasms, Disp-12 tablet, R-0, Local Print      HYDROcodone-acetaminophen (NORCO) 5-325 MG per tablet Take 1 tablet by mouth every 6 hours as needed for severe pain, Disp-12 tablet, R-0, Local Print             Final diagnoses:   Musculoskeletal back pain   I, Chanelle Faustin, am serving as a trained medical scribe to document services personally performed by Herminia Elizalde MD, based on the provider's statements to me.   IHerminia MD, was physically present and have reviewed and verified the accuracy of this note documented by Chanelle Faustin.      7/30/2018   [facility], [place], EMERGENCY DEPARTMENT     Herminia Elizalde MD  08/01/18 [id number]

## 2018-07-30 NOTE — DISCHARGE INSTRUCTIONS
You can take flexeril for muscle spasms and pain.     You can take vicodin for pain.  Do not take with tylenol since there is tylenol in vicodin.     Please follow up with your primary care doctor in 1 week for recheck if not improved.       Relieving Back Pain  Back pain is a common problem. You can strain back muscles by lifting too much weight or just by moving the wrong way. Back strain can be uncomfortable, even painful. And it can take weeks or months to improve. To help yourself feel better and prevent future back strains, try these tips.  Important Note: Do not give aspirin to children or teens without first discussing it with your healthcare provider.      ? Ice    Ice reduces muscle pain and swelling. It helps most during the first 24 to 48 hours after an injury.    Wrap an ice pack or a bag of frozen peas in a thin towel. (Never place ice directly on your skin.)    Place the ice where your back hurts the most.    Don t ice for more than 20 minutes at a time.    You can use ice several times a day.  ? Medicines  Over-the-counter pain relievers can include acetaminophen and anti-inflammatory medicines, which includes aspirin or ibuprofen. They can help ease discomfort. Some also reduce swelling.    Tell your healthcare provider about any medicines you are already taking.    Take medicines only as directed.  ? Heat  After the first 48 hours, heat can relax sore muscles and improve blood flow.    Try a warm bath or shower. Or use a heating pad set on low. To prevent a burn, keep a cloth between you and the heating pad.    Don t use a heating pad for more than 15 minutes at a time. Never sleep on a heating pad.  Date Last Reviewed: 9/1/2015 2000-2017 [employer]. [address] [place] [address], [place] [address]. All rights reserved. This information is not intended as a substitute for professional medical care. Always follow your healthcare professional's instructions.

## 2018-07-30 NOTE — ED AVS SNAPSHOT
[facility], [place], Emergency Department    [address]    [facility] [address]    Phone:  [phone]    Fax:  [phone]                                       Heike Musa   MRN: [id number]    Department:  [facility], Emergency Department   Date of Visit:  7/30/2018           After Visit Summary Signature Page     I have received my discharge instructions, and my questions have been answered. I have discussed any challenges I see with this plan with the nurse or doctor.    ..........................................................................................................................................  Patient/Patient Representative Signature      ..........................................................................................................................................  Patient Representative Print Name and Relationship to Patient    ..................................................               ................................................  Date                                            Time    ..........................................................................................................................................  Reviewed by Signature/Title    ...................................................              ..............................................  Date                                                            Time

## 2019-01-01 ENCOUNTER — TRANSFERRED RECORDS (OUTPATIENT)
Dept: MULTI SPECIALTY CLINIC | Facility: CLINIC | Age: 29
End: 2019-01-01

## 2019-01-01 LAB — PAP SMEAR - HIM PATIENT REPORTED: NEGATIVE

## 2019-01-04 ENCOUNTER — MEDICAL CORRESPONDENCE (OUTPATIENT)
Dept: HEALTH INFORMATION MANAGEMENT | Facility: CLINIC | Age: 29
End: 2019-01-04

## 2019-01-16 ENCOUNTER — OFFICE VISIT (OUTPATIENT)
Dept: ENDOCRINOLOGY | Facility: CLINIC | Age: 29
End: 2019-01-16

## 2019-01-16 VITALS
WEIGHT: 128.4 LBS | HEART RATE: 69 BPM | BODY MASS INDEX: 20.11 KG/M2 | DIASTOLIC BLOOD PRESSURE: 73 MMHG | SYSTOLIC BLOOD PRESSURE: 106 MMHG

## 2019-01-16 DIAGNOSIS — E04.1 THYROID NODULE: Primary | ICD-10-CM

## 2019-01-16 ASSESSMENT — PAIN SCALES - GENERAL: PAINLEVEL: NO PAIN (0)

## 2019-01-16 NOTE — PROGRESS NOTES
Endocrine Progress note:   01/16/2019    Interval history:   Currently breast-feeding.  No new issues.  One cycle since delivery.  No neck pain, difficulty swallowing, voice change.  No heat intolerance Interval History    Symptoms Details   Temp intolerance no   Palpitations no   SOB no   Appetite No change   Weight changes No changes   Change in BM none   Diet No    Exercise no   Energy Levels good   Sleep No changes    Mental status change alert   Skin or hair changes No change   Extremity No edema   Menses 1 cycle only       Local  No neck pain   No difficulty swallowing.   No voice change               Assessment      Multiple thyroid nodule with dominant right sided nodule with possible microcalcifications diagnosed in 2018, while she was pregnant.   Abnormal TFTs -- suppressed TSH    Plan:   Thyroid ultrasound ordered to be done at the same time as FNA (to assess remaining thyroid)   We discussed about thyroid nodules and management in great detail. Majority of thyroid nodules are benign. Majority of thyroid cancers are slow growing and have good prognosis but a small number tend to be aggressive.   Thyroid FNA for further evaluation of larger nodules is next best step.   Obtain thyroid labs and FNA in radiology.     Mirta Feliciano MD  [id number]  Endocrinology Service    ===================================.     Endocrine Clinic Consult:     Reason for consult: Hypothyroidism  Referring Physician: [facility], Clinic    HPI:   Jeni Aguila is a 29 year old female for follow-up     Jeni bland was diagnosed with thyroid nodule on right side last year when she was 3 month pregnancy. Nodule was detected on exam.   FNA was delayed due to pregnancy. US showed intermediate suspicion nodule.     Other ROS:   No eye symptoms  No headache, change in vision, loss of peripheral vision  Complete ROS that were obtained were negative.     Past medical history   no significant past medical  Illnesses   no surgeries     family  history   no family history of thyroid disease or autoimmune diseases   no history of thyroid cancer in the family     Social history  She has children aged 11, 7 and 3 year old and an infant 7 month old.   She lives with her .   She does not smoke or drink.    History reviewed. No pertinent past medical history.  History reviewed. No pertinent surgical history.  History reviewed. No pertinent family history.  Social History     Socioeconomic History     Marital status:      Spouse name: Not on file     Number of children: Not on file     Years of education: Not on file     Highest education level: Not on file   Social Needs     Financial resource strain: Not on file     Food insecurity - worry: Not on file     Food insecurity - inability: Not on file     Transportation needs - medical: Not on file     Transportation needs - non-medical: Not on file   Occupational History     Not on file   Tobacco Use     Smoking status: Never Smoker     Smokeless tobacco: Never Used   Substance and Sexual Activity     Alcohol use: Not on file     Drug use: Not on file     Sexual activity: Not on file   Other Topics Concern     Not on file   Social History Narrative     Not on file      No Known Allergies  Current Outpatient Medications   Medication     Prenatal Vit-Fe Fumarate-FA (PRENATAL MULTIVITAMIN PLUS IRON) 27-0.8 MG TABS per tablet     No current facility-administered medications for this visit.            Exam:  /73   Pulse 69   Wt 58.2 kg (128 lb 6.4 oz)   BMI 20.11 kg/m     Constitutional:   thin female, no acute distress.   Head: Normocephalic. No masses, lesions, tenderness or abnormalities   eyes:    Extraocular movements are intact.  Peripheral vision grossly intact.   Neck:  No cervical lymphadenopathy, thyroid exam showed right-sided 1.5 cm nodule, firm, Carotids without bruits.  ENT: No throat congestion, no neck nodes or sinus tenderness  Cardiovascular: regular rhythm, no  tachycardia  Respiratory: Lungs clear  Gastrointestinal: Abdomen soft, non-tender. BS normal. No striae  Musculoskeletal: gait normal and normal muscle tone  Neurologic: Normal speech, reflexes normal.   Psychiatric: mentation appears normal       TSH   Date Value Ref Range Status   09/14/2017 0.35 (L) 0.40 - 4.00 mU/L Final       T4 Free   Date Value Ref Range Status   09/14/2017 1.24 0.76 - 1.46 ng/dL Final   ]     thyroid ultrasound:  I reviewed the images.   Isoechoic; 1.6 cm nodule on the right inferior nodule.   Well demarcated. Partially cystic.   Possible microcal and grade 3 vascularity  Intermediate suspicion.     Head and neck ultrasound did not show  Any evidence of  Abnormal lymphadenopathy.

## 2019-01-16 NOTE — PATIENT INSTRUCTIONS
Biopsy of the thyroid nodule is ordered along with thyroid function testing. We will report it once results are available.

## 2019-01-16 NOTE — LETTER
1/16/2019       RE: Jeni Aguila  [address] [place]  [facility] [address]     Dear Colleague,    Thank you for referring your patient, Jeni Aguila, to the [facility] ENDOCRINOLOGY at [facility]. Please see a copy of my visit note below.    Endocrine Progress note:   01/16/2019    Interval history:   Currently breast-feeding.  No new issues.  One cycle since delivery.  No neck pain, difficulty swallowing, voice change.  No heat intolerance Interval History    Symptoms Details   Temp intolerance no   Palpitations no   SOB no   Appetite No change   Weight changes No changes   Change in BM none   Diet No    Exercise no   Energy Levels good   Sleep No changes    Mental status change alert   Skin or hair changes No change   Extremity No edema   Menses 1 cycle only       Local  No neck pain   No difficulty swallowing.   No voice change               Assessment      Multiple thyroid nodule with dominant right sided nodule with possible microcalcifications diagnosed in 2018, while she was pregnant.   Abnormal TFTs -- suppressed TSH    Plan:   Thyroid ultrasound ordered to be done at the same time as FNA (to assess remaining thyroid)   We discussed about thyroid nodules and management in great detail. Majority of thyroid nodules are benign. Majority of thyroid cancers are slow growing and have good prognosis but a small number tend to be aggressive.   Thyroid FNA for further evaluation of larger nodules is next best step.   Obtain thyroid labs and FNA in radiology.     Mirta Feliciano MD  [id number]  Endocrinology Service    ===================================.     Endocrine Clinic Consult:     Reason for consult: Hypothyroidism  Referring Physician: [facility], Clinic    HPI:   Jeni Aguila is a 29 year old female for follow-up     Jeni bland was diagnosed with thyroid nodule on right side last year when she was 3 month pregnancy. Nodule was detected on exam.   FNA was delayed due to pregnancy.  US showed intermediate suspicion nodule.     Other ROS:   No eye symptoms  No headache, change in vision, loss of peripheral vision  Complete ROS that were obtained were negative.     Past medical history   no significant past medical  Illnesses   no surgeries     family history   no family history of thyroid disease or autoimmune diseases   no history of thyroid cancer in the family     Social history  She has children aged 11, 7 and 3 year old and an infant 7 month old.   She lives with her .   She does not smoke or drink.    History reviewed. No pertinent past medical history.  History reviewed. No pertinent surgical history.  History reviewed. No pertinent family history.  Social History     Socioeconomic History     Marital status:      Spouse name: Not on file     Number of children: Not on file     Years of education: Not on file     Highest education level: Not on file   Social Needs     Financial resource strain: Not on file     Food insecurity - worry: Not on file     Food insecurity - inability: Not on file     Transportation needs - medical: Not on file     Transportation needs - non-medical: Not on file   Occupational History     Not on file   Tobacco Use     Smoking status: Never Smoker     Smokeless tobacco: Never Used   Substance and Sexual Activity     Alcohol use: Not on file     Drug use: Not on file     Sexual activity: Not on file   Other Topics Concern     Not on file   Social History Narrative     Not on file      No Known Allergies  Current Outpatient Medications   Medication     Prenatal Vit-Fe Fumarate-FA (PRENATAL MULTIVITAMIN PLUS IRON) 27-0.8 MG TABS per tablet     No current facility-administered medications for this visit.            Exam:  /73   Pulse 69   Wt 58.2 kg (128 lb 6.4 oz)   BMI 20.11 kg/m      Constitutional:   thin female, no acute distress.   Head: Normocephalic. No masses, lesions, tenderness or abnormalities   eyes:    Extraocular movements are  intact.  Peripheral vision grossly intact.   Neck:  No cervical lymphadenopathy, thyroid exam showed right-sided 1.5 cm nodule, firm, Carotids without bruits.  ENT: No throat congestion, no neck nodes or sinus tenderness  Cardiovascular: regular rhythm, no tachycardia  Respiratory: Lungs clear  Gastrointestinal: Abdomen soft, non-tender. BS normal. No striae  Musculoskeletal: gait normal and normal muscle tone  Neurologic: Normal speech, reflexes normal.   Psychiatric: mentation appears normal       TSH   Date Value Ref Range Status   09/14/2017 0.35 (L) 0.40 - 4.00 mU/L Final       T4 Free   Date Value Ref Range Status   09/14/2017 1.24 0.76 - 1.46 ng/dL Final   ]     thyroid ultrasound:  I reviewed the images.   Isoechoic; 1.6 cm nodule on the right inferior nodule.   Well demarcated. Partially cystic.   Possible microcal and grade 3 vascularity  Intermediate suspicion.     Head and neck ultrasound did not show  Any evidence of  Abnormal lymphadenopathy.       Again, thank you for allowing me to participate in the care of your patient.      Sincerely,    Mirta Feliciano MD

## 2019-01-21 ENCOUNTER — TRANSFERRED RECORDS (OUTPATIENT)
Dept: HEALTH INFORMATION MANAGEMENT | Facility: CLINIC | Age: 29
End: 2019-01-21

## 2019-01-22 ENCOUNTER — TRANSFERRED RECORDS (OUTPATIENT)
Dept: HEALTH INFORMATION MANAGEMENT | Facility: CLINIC | Age: 29
End: 2019-01-22

## 2019-02-04 ENCOUNTER — ANCILLARY PROCEDURE (OUTPATIENT)
Dept: ULTRASOUND IMAGING | Facility: CLINIC | Age: 29
End: 2019-02-04

## 2019-02-04 DIAGNOSIS — E04.1 THYROID NODULE: ICD-10-CM

## 2019-02-04 LAB
T4 FREE SERPL-MCNC: 1.08 NG/DL (ref 0.76–1.46)
TSH SERPL DL<=0.005 MIU/L-ACNC: 0.84 MU/L (ref 0.4–4)

## 2019-02-04 RX ORDER — LIDOCAINE HYDROCHLORIDE 10 MG/ML
5 INJECTION, SOLUTION INFILTRATION; PERINEURAL ONCE
Status: COMPLETED | OUTPATIENT
Start: 2019-02-04 | End: 2019-02-04

## 2019-02-04 RX ADMIN — LIDOCAINE HYDROCHLORIDE 3 ML: 10 INJECTION, SOLUTION INFILTRATION; PERINEURAL at 10:09

## 2019-02-04 NOTE — LETTER
Patient:  Jeni Aguila  :   1990  MRN:     [id number]        Ms.Fadumo NICOLA Aguila  [address] [facility] [address]        2019    Dear ,    We are writing to inform you of your test results.    This showed that the thyroid nodule was indeterminate -- meaning test could not tell for sure if this nodule is malignant or benign.     In this kind of diagnosis (called AUS) we can do molecular testing (no need to repeat biopsy, partially covered by your insurance, and could cost you $0 to $300 maximum based on insurance and income levels) or repeat the biopsy in 3-6 months.     Please let me know soon what you want to do, I will plan accordingly.     You can call  and let us know.     Best regards,   Mirta Yanez MD  [id number]  Endocrinology Service        Resulted Orders   Fine needle aspiration   Result Value Ref Range    Copath Report       Patient Name: JENI AGUILA  MR#: [id number]  Specimen #: [id number]  Collected: 2019  Received: 2019  Reported: 2019 17:14  Ordering Phy(s): RUPENDRA DEV THAKU YANEZ    For improved result formatting, select 'View Enhanced Report Format' under   Linked Documents section.    SPECIMEN/STAIN PROCESS:  Thyroid, right inferior #1 , ultrasound guided fine needle aspiration       Pap-Cyto x 3, Diff Quick Stain-cyto x 3    ----------------------------------------------------------------    CYTOLOGIC INTERPRETATION:     Thyroid, right inferior #1 , ultrasound guided fine needle aspiration:  - Atypia of undetermined significance    The [place] implied risk of malignancy and recommended clinical   management:  Atypia of undetermined significance has a 10-30% risk of malignancy,   recommend repeat FNA in 4-6 weeks,  molecular testing or lobectomy    Specimen Adequacy: Satisfactory for evaluation.    COMMENT:  Cytomorphology shows microfollicles in a background of red blood cells and    colloid. Minimal atypia is noted.    I have  personally reviewed all specimens and/or slides, including the   listed special stains, and used them  with my medical judgement to determine or confirm the final diagnosis.    Electronically signed out by:  Mark Hebert M.D., [facility]    Processed and screened at [facility]    CLINICAL HISTORY:  29-year-old woman was diagnosed with a thyroid nodule on the right side   last year when she was 3 months  pregnancy. FNA was delayed due to pregnancy. US showed intermediate   suspicious nodule The nodule measures 1.2  x 1.1 x 1.6 cm.    ,    GROSS:  Thyroid, right inferior #1 , ultrasound guided fine needle aspiration:    Received are 3 fixed slides, processed  for Pap stain, and 3 air dried slides, processed for Diff Quik stain.   Afirma tube held.    INTRAOPERATIVE CONSULTATION:    FNA Performance: Fine needle aspiration was not performed by [facility],    Pathology staff.    Imm ediate Adequacy: On site specimen adequacy evaluation was performed by   the Cytotechnologist MELVIN Love.    Onsite adequacy/interpretation:  Pass A1: Adequate, Pass A2: Put into Afirma tube. Pass A3 and A4: Adequate    MICROSCOPIC:  Microscopic examination was performed.  Arun Mars MD, Cytopathology Fellow; Mark Hebert MD, Attending    CPT Codes:   36083-ORGMQPEXP  A: [id number]    TESTING LAB LOCATION:  [facility], [address]   [address]  [phone]    COLLECTION SITE:  Client:  [facility]  Location:  [facility] (B)    Resident  RUBIO Nance

## 2019-02-05 LAB — COPATH REPORT: NORMAL

## 2019-02-28 ENCOUNTER — TELEPHONE (OUTPATIENT)
Dept: ENDOCRINOLOGY | Facility: CLINIC | Age: 29
End: 2019-02-28

## 2019-02-28 NOTE — TELEPHONE ENCOUNTER
"[phone] Per spouse's request     Spoke w/ Pt: Via [place]      Pt is requesting an appointment and also wants to know if she needs to worry about the \"indeterminate\" nodule. Requesting clarification.   Sent to Dr Feliciano for clarification and clinic coordinators for scheduling.   "

## 2019-03-06 ENCOUNTER — TELEPHONE (OUTPATIENT)
Dept: ENDOCRINOLOGY | Facility: CLINIC | Age: 29
End: 2019-03-06

## 2019-03-06 NOTE — TELEPHONE ENCOUNTER
Left detailed message on PTs vm to please call via Lost My Name speaking  regarding message from Dr Feliciano.             ----- Message from Mirta Feliciano MD sent at 3/6/2019  1:32 PM CST -----  Regarding: RE: appt  Patient has an option to do either molecular test or repeat biopsy. She has to choose what she wants to do and let me know. I sent letter but I am not sure she understood it.   If she wants to discuss this first, then she needs an appt with me to discuss results ( I need  for this)   If she is already decided, I will order the one she chose.   Mirta Feliciano MD  [id number]  Endocrinology Service      ----- Message -----  From: Katie Payan  Sent: 3/4/2019  12:26 PM  To: Kinga Gutiérrez RN, #  Subject: RE: appt                                         Does she need FNA in radiology or in clinic with [facility]? There are no orders. Please update. Thanks!!  ----- Message -----  From: Kinga Gutiérrez RN  Sent: 2/28/2019  11:21 AM  To: [id number]  Subject: appt                                             Please help schedule the following appointment(s):   Results follow up      Time frame needed by: thyroid nodule biopsy       Scheduling Comments: Pt requests appt w/ .       --Quick Add--  Appointment Date:     Appointment Time:     Provider Name:     Appointment Type (New/Return):

## 2019-03-24 ENCOUNTER — TRANSFERRED RECORDS (OUTPATIENT)
Dept: HEALTH INFORMATION MANAGEMENT | Facility: CLINIC | Age: 29
End: 2019-03-24

## 2019-05-16 ENCOUNTER — DOCUMENTATION ONLY (OUTPATIENT)
Dept: GASTROENTEROLOGY | Facility: CLINIC | Age: 29
End: 2019-05-16

## 2019-05-16 NOTE — PROGRESS NOTES
Referring Provider and Clinic:  Benedicto Bowie NP - [facility]    Diagnosis:  Dysuria; GERD w/o esophagitis    GI notes or primary provider notes related to GI problem:   Y     Pathology reports: N    Recent Lab  Reports: Y    Radiology Reports (CT/MRI) : Y    Endoscopy:  N    Colonoscopy: N            Referral Date: 1/4/19    Date Complete Records Received and sent for review: 5/16/19    Date records scanned into epic: 5/16/19    Provider Review Date: ***    Date review routed back to sender: ***    Letter sent: ***    Notes: ***

## 2019-05-30 NOTE — PROGRESS NOTES
REFERRAL REVIEW FORM    Is this a second opinion from another GI physician?  (If yes, OK to refer to for an MD only clinic visit)  No    Reason for referral: GERD?    Date records reviewed: May 30, 2019     Previous work up:    Labs  GI eval - [facility]    Summary of pertinent GI work-up:  H pylori positive - treated in Jan 2019 at [facility].     Recommendation:    Do not schedule--send letter to patient with other list of GI providers - CC to PCP    When to schedule:  NA    Date patient was contacted regarding scheduling:     Comments:   Recommend patient follow-up at [facility] as has established with them.

## 2019-07-01 ENCOUNTER — OFFICE VISIT (OUTPATIENT)
Dept: ENDOCRINOLOGY | Facility: CLINIC | Age: 29
End: 2019-07-01
Payer: COMMERCIAL

## 2019-07-01 VITALS
HEART RATE: 66 BPM | WEIGHT: 115.6 LBS | SYSTOLIC BLOOD PRESSURE: 103 MMHG | DIASTOLIC BLOOD PRESSURE: 69 MMHG | BODY MASS INDEX: 18.11 KG/M2

## 2019-07-01 DIAGNOSIS — E04.1 THYROID NODULE: Primary | ICD-10-CM

## 2019-07-01 ASSESSMENT — PAIN SCALES - GENERAL: PAINLEVEL: NO PAIN (0)

## 2019-07-01 NOTE — PROGRESS NOTES
Endocrine Progress note:   07/01/2019    Interval history:   Doing well.   No neck pain, difficulty swallowing, voice change.  No heat intolerance    Symptoms Details   Temp intolerance no   Palpitations no   SOB no   Appetite No change   Weight changes No changes   Change in BM none   Diet No    Exercise no   Energy Levels good   Sleep No changes    Mental status change alert   Skin or hair changes No change   Extremity No edema   Menses 1 cycle only       Local  No neck pain   No difficulty swallowing.   No voice change               Assessment      Multiple thyroid nodule with dominant right sided nodule with possible microcalcifications diagnosed in 2018, AUS on FNA  Abnormal TFTs -- suppressed TSH    Plan:   AUS -- discussed implications of diagnosis.   -- discussed molecular test. She is ok with the test.   -- planned for repeat fna and molecular test if needed.     Mirta Feliciano MD  [id number]  Endocrinology Service    ===================================.     Endocrine Clinic Consult:     Reason for consult: Hypothyroidism  Referring Physician: [facility]    HPI:   Jeni Aguila is a 29 year old female for follow-up     eJni bland was diagnosed with thyroid nodule on right side last year when she was 3 month pregnancy. Nodule was detected on exam.   FNA was delayed due to pregnancy. US showed intermediate suspicion nodule.   -- FNA AUS in Jan 2019  -- Did not specify if she wanted molecular test.    Other ROS:   No eye symptoms  No headache, change in vision, loss of peripheral vision  Complete ROS that were obtained were negative.     Past medical history   no significant past medical  Illnesses   no surgeries     family history   no family history of thyroid disease or autoimmune diseases   no history of thyroid cancer in the family     Social history  She has children aged 11, 7 and 3 year old and an infant 7 month old.   She lives with her .   She does not smoke or drink.    No past medical history  on file.  No past surgical history on file.  No family history on file.  Social History     Socioeconomic History     Marital status:      Spouse name: Not on file     Number of children: Not on file     Years of education: Not on file     Highest education level: Not on file   Occupational History     Not on file   Social Needs     Financial resource strain: Not on file     Food insecurity:     Worry: Not on file     Inability: Not on file     Transportation needs:     Medical: Not on file     Non-medical: Not on file   Tobacco Use     Smoking status: Never Smoker     Smokeless tobacco: Never Used   Substance and Sexual Activity     Alcohol use: Not on file     Drug use: Not on file     Sexual activity: Not on file   Lifestyle     Physical activity:     Days per week: Not on file     Minutes per session: Not on file     Stress: Not on file   Relationships     Social connections:     Talks on phone: Not on file     Gets together: Not on file     Attends [employer] service: Not on file     Active member of club or organization: Not on file     Attends meetings of clubs or organizations: Not on file     Relationship status: Not on file     Intimate partner violence:     Fear of current or ex partner: Not on file     Emotionally abused: Not on file     Physically abused: Not on file     Forced sexual activity: Not on file   Other Topics Concern     Not on file   Social History Narrative     Not on file      No Known Allergies  Current Outpatient Medications   Medication     Prenatal Vit-Fe Fumarate-FA (PRENATAL MULTIVITAMIN PLUS IRON) 27-0.8 MG TABS per tablet     No current facility-administered medications for this visit.            Exam:  /69   Pulse 66   Wt 52.4 kg (115 lb 9.6 oz)   BMI 18.11 kg/m     Constitutional:   thin female, no acute distress.   Head: Normocephalic. No masses, lesions, tenderness or abnormalities   eyes:    Extraocular movements are intact.  Peripheral vision grossly intact.    Neck:  No cervical lymphadenopathy, thyroid exam showed right-sided 1.5 cm nodule, firm, Carotids without bruits.  ENT: No throat congestion, no neck nodes or sinus tenderness  Cardiovascular: regular rhythm, no tachycardia  Respiratory: Lungs clear  Gastrointestinal: Abdomen soft, non-tender. BS normal. No striae  Musculoskeletal: gait normal and normal muscle tone  Neurologic: Normal speech, reflexes normal.   Psychiatric: mentation appears normal       TSH   Date Value Ref Range Status   02/04/2019 0.84 0.40 - 4.00 mU/L Final   09/14/2017 0.35 (L) 0.40 - 4.00 mU/L Final       T4 Free   Date Value Ref Range Status   02/04/2019 1.08 0.76 - 1.46 ng/dL Final   09/14/2017 1.24 0.76 - 1.46 ng/dL Final   ]     thyroid ultrasound:  I reviewed the images.   Isoechoic; 1.6 cm nodule on the right inferior nodule.   Well demarcated. Partially cystic.   Possible microcal and grade 3 vascularity  Intermediate suspicion.     Head and neck ultrasound did not show  Any evidence of  Abnormal lymphadenopathy.

## 2019-07-01 NOTE — LETTER
7/1/2019       RE: Jeni Aguila  [address]  [place] [facility] [address]     Dear Colleague,    Thank you for referring your patient, Jeni Aguila, to the [facility] ENDOCRINOLOGY at [facility]. Please see a copy of my visit note below.    Endocrine Progress note:   07/01/2019    Interval history:   Doing well.   No neck pain, difficulty swallowing, voice change.  No heat intolerance    Symptoms Details   Temp intolerance no   Palpitations no   SOB no   Appetite No change   Weight changes No changes   Change in BM none   Diet No    Exercise no   Energy Levels good   Sleep No changes    Mental status change alert   Skin or hair changes No change   Extremity No edema   Menses 1 cycle only       Local  No neck pain   No difficulty swallowing.   No voice change               Assessment      Multiple thyroid nodule with dominant right sided nodule with possible microcalcifications diagnosed in 2018, AUS on FNA  Abnormal TFTs -- suppressed TSH    Plan:   AUS -- discussed implications of diagnosis.   -- discussed molecular test. She is ok with the test.   -- planned for repeat fna and molecular test if needed.     Mirta Feliciano MD  [id number]  Endocrinology Service    ===================================.     Endocrine Clinic Consult:     Reason for consult: Hypothyroidism  Referring Physician: [facility]    HPI:   Jeni Aguila is a 29 year old female for follow-up     Jeni bland was diagnosed with thyroid nodule on right side last year when she was 3 month pregnancy. Nodule was detected on exam.   FNA was delayed due to pregnancy. US showed intermediate suspicion nodule.   -- FNA AUS in Jan 2019  -- Did not specify if she wanted molecular test.    Other ROS:   No eye symptoms  No headache, change in vision, loss of peripheral vision  Complete ROS that were obtained were negative.     Past medical history   no significant past medical  Illnesses   no surgeries     family history   no  family history of thyroid disease or autoimmune diseases   no history of thyroid cancer in the family     Social history  She has children aged 11, 7 and 3 year old and an infant 7 month old.   She lives with her .   She does not smoke or drink.    No past medical history on file.  No past surgical history on file.  No family history on file.  Social History     Socioeconomic History     Marital status:      Spouse name: Not on file     Number of children: Not on file     Years of education: Not on file     Highest education level: Not on file   Occupational History     Not on file   Social Needs     Financial resource strain: Not on file     Food insecurity:     Worry: Not on file     Inability: Not on file     Transportation needs:     Medical: Not on file     Non-medical: Not on file   Tobacco Use     Smoking status: Never Smoker     Smokeless tobacco: Never Used   Substance and Sexual Activity     Alcohol use: Not on file     Drug use: Not on file     Sexual activity: Not on file   Lifestyle     Physical activity:     Days per week: Not on file     Minutes per session: Not on file     Stress: Not on file   Relationships     Social connections:     Talks on phone: Not on file     Gets together: Not on file     Attends Oriental orthodox service: Not on file     Active member of club or organization: Not on file     Attends meetings of clubs or organizations: Not on file     Relationship status: Not on file     Intimate partner violence:     Fear of current or ex partner: Not on file     Emotionally abused: Not on file     Physically abused: Not on file     Forced sexual activity: Not on file   Other Topics Concern     Not on file   Social History Narrative     Not on file      No Known Allergies  Current Outpatient Medications   Medication     Prenatal Vit-Fe Fumarate-FA (PRENATAL MULTIVITAMIN PLUS IRON) 27-0.8 MG TABS per tablet     No current facility-administered medications for this visit.             Exam:  /69   Pulse 66   Wt 52.4 kg (115 lb 9.6 oz)   BMI 18.11 kg/m      Constitutional:   thin female, no acute distress.   Head: Normocephalic. No masses, lesions, tenderness or abnormalities   eyes:    Extraocular movements are intact.  Peripheral vision grossly intact.   Neck:  No cervical lymphadenopathy, thyroid exam showed right-sided 1.5 cm nodule, firm, Carotids without bruits.  ENT: No throat congestion, no neck nodes or sinus tenderness  Cardiovascular: regular rhythm, no tachycardia  Respiratory: Lungs clear  Gastrointestinal: Abdomen soft, non-tender. BS normal. No striae  Musculoskeletal: gait normal and normal muscle tone  Neurologic: Normal speech, reflexes normal.   Psychiatric: mentation appears normal       TSH   Date Value Ref Range Status   02/04/2019 0.84 0.40 - 4.00 mU/L Final   09/14/2017 0.35 (L) 0.40 - 4.00 mU/L Final       T4 Free   Date Value Ref Range Status   02/04/2019 1.08 0.76 - 1.46 ng/dL Final   09/14/2017 1.24 0.76 - 1.46 ng/dL Final   ]     thyroid ultrasound:  I reviewed the images.   Isoechoic; 1.6 cm nodule on the right inferior nodule.   Well demarcated. Partially cystic.   Possible microcal and grade 3 vascularity  Intermediate suspicion.     Head and neck ultrasound did not show  Any evidence of  Abnormal lymphadenopathy.       Again, thank you for allowing me to participate in the care of your patient.      Sincerely,    Mirta Feliciano MD

## 2019-07-01 NOTE — PATIENT INSTRUCTIONS
Thyroid biopsy is ordered for you, please call to schedule.     To schedule with our Imaging Department, please call: [phone]      ---------------------------------------------------------------------------------------------------

## 2019-07-11 ENCOUNTER — ANCILLARY PROCEDURE (OUTPATIENT)
Dept: ULTRASOUND IMAGING | Facility: CLINIC | Age: 29
End: 2019-07-11
Attending: INTERNAL MEDICINE
Payer: COMMERCIAL

## 2019-07-11 DIAGNOSIS — E04.1 THYROID NODULE: ICD-10-CM

## 2019-07-11 RX ORDER — LIDOCAINE HYDROCHLORIDE 10 MG/ML
5 INJECTION, SOLUTION INFILTRATION; PERINEURAL ONCE
Status: COMPLETED | OUTPATIENT
Start: 2019-07-11 | End: 2019-07-11

## 2019-07-11 RX ADMIN — LIDOCAINE HYDROCHLORIDE 2 ML: 10 INJECTION, SOLUTION INFILTRATION; PERINEURAL at 10:11

## 2019-07-12 LAB — COPATH REPORT: NORMAL

## 2019-07-24 DIAGNOSIS — E04.1 THYROID NODULE: Primary | ICD-10-CM

## 2019-07-24 NOTE — RESULT ENCOUNTER NOTE
Please schedule a phone visit with  for result review in 2 weeks.   Mirta Feliciano MD  Endocrinology Service

## 2019-08-05 LAB — LAB SCANNED RESULT: NORMAL

## 2019-12-10 ENCOUNTER — PRENATAL OFFICE VISIT (OUTPATIENT)
Dept: NURSING | Facility: CLINIC | Age: 29
End: 2019-12-10
Payer: COMMERCIAL

## 2019-12-10 VITALS
WEIGHT: 144.8 LBS | HEART RATE: 88 BPM | BODY MASS INDEX: 24.72 KG/M2 | HEIGHT: 64 IN | TEMPERATURE: 98 F | SYSTOLIC BLOOD PRESSURE: 109 MMHG | DIASTOLIC BLOOD PRESSURE: 74 MMHG

## 2019-12-10 DIAGNOSIS — Z34.00 SUPERVISION OF NORMAL FIRST PREGNANCY: Primary | ICD-10-CM

## 2019-12-10 DIAGNOSIS — Z23 NEED FOR TDAP VACCINATION: ICD-10-CM

## 2019-12-10 PROBLEM — Z32.01 PREGNANCY CONFIRMED BY POSITIVE BLOOD TEST: Status: ACTIVE | Noted: 2019-11-13

## 2019-12-10 PROBLEM — Z71.85 IMMUNIZATION COUNSELING: Status: ACTIVE | Noted: 2019-07-03

## 2019-12-10 PROBLEM — G43.109 VERTIGINOUS MIGRAINE: Status: ACTIVE | Noted: 2018-11-21

## 2019-12-10 LAB
ABO + RH BLD: NORMAL
ABO + RH BLD: NORMAL
ALBUMIN UR-MCNC: NEGATIVE MG/DL
APPEARANCE UR: CLEAR
BILIRUB UR QL STRIP: NEGATIVE
BLD GP AB SCN SERPL QL: NORMAL
BLOOD BANK CMNT PATIENT-IMP: NORMAL
COLOR UR AUTO: YELLOW
ERYTHROCYTE [DISTWIDTH] IN BLOOD BY AUTOMATED COUNT: 13.1 % (ref 10–15)
GLUCOSE UR STRIP-MCNC: NEGATIVE MG/DL
HBV SURFACE AG SERPL QL IA: NONREACTIVE
HCT VFR BLD AUTO: 36.5 % (ref 35–47)
HGB BLD-MCNC: 12.4 G/DL (ref 11.7–15.7)
HGB UR QL STRIP: NEGATIVE
HIV 1+2 AB+HIV1 P24 AG SERPL QL IA: NONREACTIVE
KETONES UR STRIP-MCNC: NEGATIVE MG/DL
LEUKOCYTE ESTERASE UR QL STRIP: NEGATIVE
MCH RBC QN AUTO: 30.5 PG (ref 26.5–33)
MCHC RBC AUTO-ENTMCNC: 34 G/DL (ref 31.5–36.5)
MCV RBC AUTO: 90 FL (ref 78–100)
NITRATE UR QL: NEGATIVE
PH UR STRIP: 5.5 PH (ref 5–7)
PLATELET # BLD AUTO: 210 10E9/L (ref 150–450)
RBC # BLD AUTO: 4.06 10E12/L (ref 3.8–5.2)
SOURCE: NORMAL
SP GR UR STRIP: >1.03 (ref 1–1.03)
SPECIMEN EXP DATE BLD: NORMAL
UROBILINOGEN UR STRIP-ACNC: 0.2 EU/DL (ref 0.2–1)
WBC # BLD AUTO: 11.1 10E9/L (ref 4–11)

## 2019-12-10 PROCEDURE — 87086 URINE CULTURE/COLONY COUNT: CPT | Performed by: ADVANCED PRACTICE MIDWIFE

## 2019-12-10 PROCEDURE — 86900 BLOOD TYPING SEROLOGIC ABO: CPT | Performed by: ADVANCED PRACTICE MIDWIFE

## 2019-12-10 PROCEDURE — 99207 ZZC NO CHARGE NURSE ONLY: CPT

## 2019-12-10 PROCEDURE — 86762 RUBELLA ANTIBODY: CPT | Performed by: ADVANCED PRACTICE MIDWIFE

## 2019-12-10 PROCEDURE — 86901 BLOOD TYPING SEROLOGIC RH(D): CPT | Performed by: ADVANCED PRACTICE MIDWIFE

## 2019-12-10 PROCEDURE — 81003 URINALYSIS AUTO W/O SCOPE: CPT | Performed by: ADVANCED PRACTICE MIDWIFE

## 2019-12-10 PROCEDURE — 36415 COLL VENOUS BLD VENIPUNCTURE: CPT | Performed by: ADVANCED PRACTICE MIDWIFE

## 2019-12-10 PROCEDURE — 99000 SPECIMEN HANDLING OFFICE-LAB: CPT | Performed by: ADVANCED PRACTICE MIDWIFE

## 2019-12-10 PROCEDURE — 83021 HEMOGLOBIN CHROMOTOGRAPHY: CPT | Mod: 90 | Performed by: ADVANCED PRACTICE MIDWIFE

## 2019-12-10 PROCEDURE — 87389 HIV-1 AG W/HIV-1&-2 AB AG IA: CPT | Performed by: ADVANCED PRACTICE MIDWIFE

## 2019-12-10 PROCEDURE — 85027 COMPLETE CBC AUTOMATED: CPT | Performed by: ADVANCED PRACTICE MIDWIFE

## 2019-12-10 PROCEDURE — 86780 TREPONEMA PALLIDUM: CPT | Performed by: ADVANCED PRACTICE MIDWIFE

## 2019-12-10 PROCEDURE — 87340 HEPATITIS B SURFACE AG IA: CPT | Performed by: ADVANCED PRACTICE MIDWIFE

## 2019-12-10 PROCEDURE — 86850 RBC ANTIBODY SCREEN: CPT | Performed by: ADVANCED PRACTICE MIDWIFE

## 2019-12-10 SDOH — SOCIAL STABILITY: SOCIAL INSECURITY
WITHIN THE LAST YEAR, HAVE YOU BEEN KICKED, HIT, SLAPPED, OR OTHERWISE PHYSICALLY HURT BY YOUR PARTNER OR EX-PARTNER?: NO

## 2019-12-10 SDOH — HEALTH STABILITY: MENTAL HEALTH
STRESS IS WHEN SOMEONE FEELS TENSE, NERVOUS, ANXIOUS, OR CAN'T SLEEP AT NIGHT BECAUSE THEIR MIND IS TROUBLED. HOW STRESSED ARE YOU?: NOT AT ALL

## 2019-12-10 SDOH — SOCIAL STABILITY: SOCIAL INSECURITY
WITHIN THE LAST YEAR, HAVE TO BEEN RAPED OR FORCED TO HAVE ANY KIND OF SEXUAL ACTIVITY BY YOUR PARTNER OR EX-PARTNER?: NO

## 2019-12-10 SDOH — SOCIAL STABILITY: SOCIAL NETWORK: ARE YOU MARRIED, WIDOWED, DIVORCED, SEPARATED, NEVER MARRIED, OR LIVING WITH A PARTNER?: MARRIED

## 2019-12-10 SDOH — SOCIAL STABILITY: SOCIAL INSECURITY: WITHIN THE LAST YEAR, HAVE YOU BEEN HUMILIATED OR EMOTIONALLY ABUSED IN OTHER WAYS BY YOUR PARTNER OR EX-PARTNER?: NO

## 2019-12-10 SDOH — SOCIAL STABILITY: SOCIAL INSECURITY: WITHIN THE LAST YEAR, HAVE YOU BEEN AFRAID OF YOUR PARTNER OR EX-PARTNER?: NO

## 2019-12-10 ASSESSMENT — MIFFLIN-ST. JEOR: SCORE: 1361.81

## 2019-12-10 NOTE — PROGRESS NOTES
Important Information for Provider:     Patient presents for new ob teaching and labs, first pregnancy. Pregnancy confirmed by blood test 11/07/19. Handouts reviewed and given. Has NOB with CNM 12/13/19, needs Rx for prenatal vitamins, pharmacy updated. Encouraged walking daily.     Caffeine intake/servings daily - 0  Calcium intake/servings daily - 3  Exercise 0 times weekly - describe ; precautions given  Sunscreen used - Yes  Seatbelts used - Yes  Guns stored in the home - No  Self Breast Exam - No  Pap test up to date -  No  Eye exam up to date -  No  Dental exam up to date -  No  Immunizations reviewed and up to date - No  Abuse: Current or Past (Physical, Sexual or Emotional) - No  Do you feel safe in your environment - Yes  Do you cope well with stress - Yes  Do you suffer from insomnia - No         Prenatal OB Questionnaire  Patient supplied answers from flow sheet for:  Prenatal OB Questionnaire.  Past Medical History  Diabetes?: No  Hypertension : No  Heart disease, mitral valve prolapse or rheumatic fever?: No  An autoimmune disease such as lupus or rheumatoid arthritis?: No  Kidney disease or urinary tract infection?: No  Epilepsy, seizures or spells?: No  Migraine headaches?: Yes  A stroke or loss of function or sensation?: No  Any other neurological problems?: No  Have you ever been treated for depression?: No  Are you having problems with crying spells or loss of self-esteem?: No  Have you ever required psychiatric care?: No  Have you ever had hepatitis, liver disease or jaundice?: No  Have you been treated for blood clots in your veins, deep vein thromosis, inflammation in the veins, thrombosis, phlebitis, pulmonary embolism or varicosities?: No  Have you had excessive bleeding after surgery or dental work?: No  Do you bleed more than other women after a cut or scratch?: No  Do you have a history of anemia?: No  Have you ever had thyroid problems or taken thyroid medication?: No   Do you have any  endocrine problems?: No  Have you ever been in a major accident or suffered serious trauma?: No  Within the last year, has anyone hit, slapped, kicked or otherwise hurt you?: No  In the last year, has anyone forced you to have sex when you didn't want to?: No    Past Medical History 2   Have you ever received a blood transfusion?: No  Would you refuse a blood transfusion if a doctor judged it to be medically necessary?: No   If you answered Yes, would you rather die than receive a blood transfusion?: No  If you answered Yes, is this for Orthodox reasons?: No  Does anyone in your home smoke?: No  Do you use tobacco products?: No  Do you drink beer, wine or hard liquor?: No  Do you use any of the following: marijuana, speed, cocaine, heroin, hallucinogens or other drugs?: No   Is your blood type Rh negative?: No  Have you ever had abnormal antibodies in your blood?: No  Have you ever had asthma?: No  Have you ever had tuberculosis?: No  Do you have any allergies to drugs or over-the-counter medications?: No  Allergies: Dust Mites, Aspartame, Ethanol, Venlafaxine, Hydrochloride, Sertraline: No  Have you had any breast problems?: No  Have you ever ?: No  Have you had any gynecological surgical procedures such as cervical conization, a LEEP procedure, laser treatment, cryosurgery of the cervix or a dilation and curettage, etc?: No  Have you ever had any other surgical procedures?: No  Have you been hospitalized for a nonsurgical reason excluding normal delivery?: No  Have you ever had any anesthetic complications?: No  Have you ever had an abnormal pap smear?: No    Past Medical History (Continued)  Do you have a history of abnormalities of the uterus?: No  Did your mother take SYLVAIN or any other hormones when she was pregnant with you?: No  Did it take you more than a year to become pregnant?: No  Have you ever been evaluated or treated for infertility?: No  Is there a history of medical problems in your family,  which you feel may be important to this pregnancy?: No  Do you have any other problems we have not asked about which you feel may be important to this pregnancy?: No    Symptoms since last menstrual period  Do you have any of the following symptoms: abdominal pain, blood in stools or urine, chest pain, shortness of breath, coughing or vomiting up blood, your heart racing or skipping beats, nausea and vomiting, pain on urination or vaginal discharge or bleed: No  Will the patient be 35 years old or older at the time of delivery?: No    Has the patient, baby's father or anyone in either family had:  Thalassemia (Italian, Greek, Mediterranean or  background only) and an MCV result less than 80?: No  Neural tube defect such as meningomyelocele, spina bifida or anencephaly?: No  Congenital heart defect?: No  Down's Syndrome?: No  Uriah-Sachs disease (Cheondoism, Cajun, Lao-Burundian)?: No  Sickle cell disease or trait ()?: No  Hemophilia or other inherited problems of blood?: No  Muscular dystrophy?: No  Cystic fibrosis?: No  [place]'s chorea?: No  Mental retardation/autism?: No  If yes, was the person tested for fragile X?: No  Any other inherited genetic or chromosomal disorder?: No  Maternal metabolic disorder (e.g Insulin-dependent diabetes, PKU)?: No  A child with birth defects not listed above?: No  Recurrent pregnancy loss or stillbirth?: No   Has the patient had any medications/street drugs/alcohol since her last menstrual period?: No  Does the patient or baby's father have any other genetic risks?: No    Infection History   Do you object to being tested for Hepatitis B?: No  Do you object to being tested for HIV?: No   Do you feel that you are at high risk for coming in contact with the AIDS virus?: No  Have you ever been treated for tuberculosis?: No  Have you ever had a positive skin test for tuberculosis?: No  Do you live with someone who has tuberculosis?: No  Have you ever been exposed to  tuberculosis?: No  Do you have genital herpes?: No  Does your partner have genital herpes?: No  Have you had a viral illness since your last period?: No  Have you ever had gonorrhea, chlamydia, syphilis, venereal warts, trichomoniasis, pelvic inflammatory disease or any other sexually transmitted disease?: No  Do you know if you are a genital group B streptococcus carrier?: No  Have you had chicken pox/varicella?: No   Have you been vaccinated against chicken Pox?: No  Have you had any other infectious diseases?: No      Allergies as of 12/10/2019:    Allergies as of 12/10/2019     (No Known Allergies)       Current medications are:  Current Outpatient Medications   Medication Sig Dispense Refill     cholecalciferol (VITAMIN D3) 5000 units (125 mcg) capsule   11         Early ultrasound screening tool:    Does patient have irregular periods?  No  Did patient use hormonal birth control in the three months prior to positive urine pregnancy test? No  Is the patient breastfeeding?  No  Is the patient 10 weeks or greater at time of education visit?  Yes

## 2019-12-11 LAB
BACTERIA SPEC CULT: NORMAL
HGB A1 MFR BLD: 95.2 % (ref 95–97.9)
HGB A2 MFR BLD: 3.1 % (ref 2–3.5)
HGB C MFR BLD: 0 % (ref 0–0)
HGB E MFR BLD: 0 % (ref 0–0)
HGB F MFR BLD: 1.7 % (ref 0–2.1)
HGB FRACT BLD ELPH-IMP: NORMAL
HGB OTHER MFR BLD: 0 % (ref 0–0)
HGB S BLD QL SOLY: NORMAL
HGB S MFR BLD: 0 % (ref 0–0)
PATH INTERP BLD-IMP: NORMAL
RUBV IGG SERPL IA-ACNC: 49 IU/ML
SPECIMEN SOURCE: NORMAL
T PALLIDUM AB SER QL: NONREACTIVE

## 2019-12-13 ENCOUNTER — PRENATAL OFFICE VISIT (OUTPATIENT)
Dept: MIDWIFE SERVICES | Facility: CLINIC | Age: 29
End: 2019-12-13
Payer: COMMERCIAL

## 2019-12-13 VITALS
WEIGHT: 146 LBS | SYSTOLIC BLOOD PRESSURE: 117 MMHG | TEMPERATURE: 97.6 F | HEART RATE: 86 BPM | BODY MASS INDEX: 25.06 KG/M2 | DIASTOLIC BLOOD PRESSURE: 72 MMHG

## 2019-12-13 DIAGNOSIS — Z34.00 ENCOUNTER FOR SUPERVISION OF NORMAL FIRST PREGNANCY, UNSPECIFIED TRIMESTER: Primary | ICD-10-CM

## 2019-12-13 PROCEDURE — 99207 ZZC FIRST OB VISIT: CPT | Performed by: ADVANCED PRACTICE MIDWIFE

## 2019-12-13 NOTE — LETTER
[facility]  [address] [place] [address] [address]  [facility] [address]  [phone]      December 13, 2019      Heike uMsa  [address]  [facility] [place] [address]              To Whom It May Concern:    Heike Musa is being seen in our clinic for prenatal care.  Her Estimated Date of Delivery: Jun 8, 2020.      Sincerely,        Liz Slade CNM

## 2019-12-13 NOTE — PROGRESS NOTES
Pt here with two friends and . Feeling ok, very tired and having some pelvic pressure with urination, occasional urge incontinence. First baby, certain LMP. Initially gave last day of LMP but when discussed we changed it to first day of LMP. Fundal height measuring at or just below the U, discrepant from expected 14w4d EGA. Pt reports feeling some movements. Ultrasound ordered for dating to confirm. Has been having constipation and is eating a lot of carbs due to intolerance of other normal foods. Discussed trying to add vegetables and meat back as able, increase protein through beans, lentils, etc. Reviewed CNM service, schedule of visits, call rotation. RTC for dating ultrasound as scheduled. US will determine timing of fetal survey. MML    14w4d   Heike Musa is a 29 year old who presents to the clinic for an new ob visit. She is not a previous CNM patient.  Estimated Date of Delivery: Jun 8, 2020 is calculated from Patient's last menstrual period was 09/02/2019 (exact date).     She has not had bleeding since her LMP.   She has had mild nausea. Weigh loss has not occurred.   This was a planned pregnancy.   FOB is involved,  [place]   OTHER CONCERNS:     INFECTION HISTORY  HIV: no  Hepatitis B: no  Hepatitis C: no  Syphilis:  no  Tuberculosis: no   PPD- no  Herpes self: no  Herpes partner:  no  Chlamydia:  no  Gonorrhea:  no  HPV: no  BV:  no  Trichomonis:  no  Chicken Pox:  YES  ====================================================  GENETIC SCREENING  Genetic screening reviewed. High Risk? na  ====================================================  PERSONAL/SOCIAL HISTORY  Lives - did not clarify who she is currently living with.   HX OF ABUSE: no  =====================================================   REVIEW OF SYSTEMS  CONSTITUTIONAL: NEGATIVE for fever, chills  EYES: NEGATIVE for vision changes   RESP: NEGATIVE for significant cough or SOB  CV: NEGATIVE for chest pain,  palpitations   GI: NEGATIVE for nausea, abdominal pain, heartburn, or change in bowel habits  : NEGATIVE for frequency, dysuria, or hematuria  MUSCULOSKELETAL: NEGATIVE for significant arthralgias or myalgia  NEURO: NEGATIVE for weakness, dizziness or paresthesias or headache  ====================================================    PHYSICAL EXAM:  /72   Pulse 86   Temp 97.6  F (36.4  C) (Oral)   Wt 66.2 kg (146 lb)   LMP 2019 (Exact Date)   BMI 25.06 kg/m    BMI- Body mass index is 25.06 kg/m .,     RECOMMENDED WEIGHT GAIN: 15-25 lbs.  GENERAL:  Pleasant pregnant female, alert, well groomed.   SKIN:  Warm and dry, without lesions or rashes  HEAD: Symmetrical features.   LUNGS:  Clear to auscultation.  HEART:  RRR without murmur.  ABDOMEN: Soft without masses , tenderness or organomegaly.  No CVA tenderness. Circular scars noted, likely related to a cultural practice.     MUSCULOSKELETAL:  Full range of motion  EXTREMITIES:  No edema. No significant varicosities.   =========================================  ASSESSMENT:  14w4d  (Z34.00) Encounter for supervision of normal first pregnancy, unspecified trimester  (primary encounter diagnosis)  Comment:   Plan: US OB >14 Weeks Follow Up, CANCELED: US OB > 14        Weeks  ==========================================  PLAN:  Instructed on use of triage nurse line and contacting the on call CNM after hours for an urgent need such as fever, vagina bleeding, bladder or vaginal infection, rupture of membranes,  or term labor.    Discussed the indications, uses for and false positives for quad screen, nuchal translucency and fetal survey ultrasound at 18-20 weeks gestation. Will inform us at the next visit if she wished to avail herself of these screens.  Instructed on best evidence for: weight gain for her BMI for pregnancy; healthy diet and foods to avoid; exercise and activity during pregnancy;avoiding exposure to toxoplasmosis; and  maintenance of a generally healthy lifestyle.   Discussed the harms, benefits, side effects and alternative therapies for current prescribed and OTC medications.  SUSAN RivasM

## 2019-12-13 NOTE — Clinical Note
Please abstract the following data from this visit with this patient into the appropriate field in Epic:Tests that can be patient reported without a hard copy:Pap smear done on this date: 01/2019 (approximately), by this group: [place] were NIL. Other Tests found in the patient's chart through Chart Review/Care Everywhere:{Abstract Quality List (Optional):039222}Note to Abstraction: If this section is blank, no results were found via Chart Review/Care Everywhere.

## 2019-12-19 ENCOUNTER — ANCILLARY PROCEDURE (OUTPATIENT)
Dept: ULTRASOUND IMAGING | Facility: CLINIC | Age: 29
End: 2019-12-19
Attending: ADVANCED PRACTICE MIDWIFE
Payer: COMMERCIAL

## 2019-12-19 ENCOUNTER — PRENATAL OFFICE VISIT (OUTPATIENT)
Dept: MIDWIFE SERVICES | Facility: CLINIC | Age: 29
End: 2019-12-19
Attending: ADVANCED PRACTICE MIDWIFE
Payer: COMMERCIAL

## 2019-12-19 VITALS
DIASTOLIC BLOOD PRESSURE: 76 MMHG | HEART RATE: 70 BPM | BODY MASS INDEX: 24.98 KG/M2 | WEIGHT: 145.5 LBS | SYSTOLIC BLOOD PRESSURE: 114 MMHG

## 2019-12-19 DIAGNOSIS — Z34.00 ENCOUNTER FOR SUPERVISION OF NORMAL FIRST PREGNANCY, UNSPECIFIED TRIMESTER: ICD-10-CM

## 2019-12-19 DIAGNOSIS — Z34.00 ENCOUNTER FOR SUPERVISION OF NORMAL FIRST PREGNANCY, UNSPECIFIED TRIMESTER: Primary | ICD-10-CM

## 2019-12-19 PROCEDURE — 99207 ZZC PRENATAL VISIT: CPT | Performed by: ADVANCED PRACTICE MIDWIFE

## 2019-12-19 PROCEDURE — 76801 OB US < 14 WKS SINGLE FETUS: CPT | Performed by: OBSTETRICS & GYNECOLOGY

## 2019-12-19 RX ORDER — PRENATAL VIT/IRON FUM/FOLIC AC 27MG-0.8MG
1 TABLET ORAL DAILY
COMMUNITY
End: 2020-02-24

## 2019-12-19 NOTE — PROGRESS NOTES
15w3d  Sb  present.  Patient here after U/S  U/S shows S= D with fundal fibroid causing increase in fundal height.   Explained at length what fibroid is, what it can cause, any issues in pregnancy.  Discussed positioning of fibroid and why last time we thought that the baby was bigger, reviewed that due date will remain the same and we will watch size of fibroid, explained that may need more U/s this pregnancy than normal, due to where fibroid is will not affect delivery.  Patient had lots of questions.   Will rtc in 3-4 weeks for fetal survey and will watch size of fibroid, mrb

## 2020-01-13 ENCOUNTER — PRENATAL OFFICE VISIT (OUTPATIENT)
Dept: MIDWIFE SERVICES | Facility: CLINIC | Age: 30
End: 2020-01-13
Attending: ADVANCED PRACTICE MIDWIFE
Payer: COMMERCIAL

## 2020-01-13 VITALS
BODY MASS INDEX: 25.4 KG/M2 | SYSTOLIC BLOOD PRESSURE: 108 MMHG | DIASTOLIC BLOOD PRESSURE: 70 MMHG | HEART RATE: 83 BPM | TEMPERATURE: 97.8 F | WEIGHT: 148 LBS

## 2020-01-13 DIAGNOSIS — Z34.02 ENCOUNTER FOR SUPERVISION OF NORMAL FIRST PREGNANCY IN SECOND TRIMESTER: ICD-10-CM

## 2020-01-13 DIAGNOSIS — Z34.00 ENCOUNTER FOR SUPERVISION OF NORMAL FIRST PREGNANCY, UNSPECIFIED TRIMESTER: Primary | ICD-10-CM

## 2020-01-13 PROBLEM — Z32.01 PREGNANCY CONFIRMED BY POSITIVE BLOOD TEST: Status: RESOLVED | Noted: 2019-11-13 | Resolved: 2020-01-13

## 2020-01-13 PROCEDURE — 99207 ZZC PRENATAL VISIT: CPT | Performed by: ADVANCED PRACTICE MIDWIFE

## 2020-01-13 NOTE — PATIENT INSTRUCTIONS
HEARTBURN  -Small frequent meals  -Separate eating solid foods from liquid foods by at least 1 hour and do not eat anything 2 hours before going to bed.  -put the top part of your bed on blocks to sleep or sleep propped up using pillows  -Avoid acidic, fatty, spicy foods, tea, coffee, alcohol, sugar, white rice, bread and pasta  -Sip teas slowly (single or in a combination)   -chamomile -dandelion root   -licorice -cleo   -fennel -lemon balm   -peppermint -marshmallow root  -Slippery elm tablets made into gruel by adding water and punch of cleo or cinnamon.  -Sip a little cider vinegar with warm water  -Essential oils to massage in upper abdomen:   -chamomile -joshua   -fennel -sandalwood   -lemon balm  -Papaya tablets or papaya leaf tea    -eat alone or make tea of   -fennel seeds -anise seeds   -cumin seeds -dill seed    -eat raw apple peels    -drink a glass of milk as the last thing before going to bed    Prepared by ELKE Novoa

## 2020-01-13 NOTE — PROGRESS NOTES
19w0d  Chilean  here.  Missed fetal survey U/S today.  Needs to reschedule for next 1 -2 weeks, concerned about iron, reassured that NOB labs were normal, worried about gaining too much weight.  Reassured all is well.   Given information about heartburn pt reports 'gas-y' herbal handout on heartburn given and enc. To increase water intake.  rtc in 1-2 weeks for fetal survey and 4 weeks for next prenatal visit.  calvin

## 2020-01-20 ENCOUNTER — PRENATAL OFFICE VISIT (OUTPATIENT)
Dept: MIDWIFE SERVICES | Facility: CLINIC | Age: 30
End: 2020-01-20
Attending: ADVANCED PRACTICE MIDWIFE
Payer: COMMERCIAL

## 2020-01-20 ENCOUNTER — ANCILLARY PROCEDURE (OUTPATIENT)
Dept: ULTRASOUND IMAGING | Facility: CLINIC | Age: 30
End: 2020-01-20
Attending: ADVANCED PRACTICE MIDWIFE
Payer: COMMERCIAL

## 2020-01-20 VITALS
BODY MASS INDEX: 25.75 KG/M2 | SYSTOLIC BLOOD PRESSURE: 104 MMHG | WEIGHT: 150 LBS | DIASTOLIC BLOOD PRESSURE: 72 MMHG | HEART RATE: 89 BPM

## 2020-01-20 DIAGNOSIS — O44.42 LOW-LYING PLACENTA IN SECOND TRIMESTER: Primary | ICD-10-CM

## 2020-01-20 DIAGNOSIS — Z34.02 ENCOUNTER FOR SUPERVISION OF NORMAL FIRST PREGNANCY IN SECOND TRIMESTER: ICD-10-CM

## 2020-01-20 DIAGNOSIS — Z34.00 ENCOUNTER FOR SUPERVISION OF NORMAL FIRST PREGNANCY, UNSPECIFIED TRIMESTER: ICD-10-CM

## 2020-01-20 PROCEDURE — T1013 SIGN LANG/ORAL INTERPRETER: HCPCS | Mod: U3 | Performed by: ADVANCED PRACTICE MIDWIFE

## 2020-01-20 PROCEDURE — 99207 ZZC PRENATAL VISIT: CPT | Performed by: ADVANCED PRACTICE MIDWIFE

## 2020-01-20 PROCEDURE — 76805 OB US >/= 14 WKS SNGL FETUS: CPT | Performed by: OBSTETRICS & GYNECOLOGY

## 2020-01-20 NOTE — LETTER
[address] [address]  [phone]        January 20, 2020          To who it may concern:    Heike Musa is pregnant and her due date is 6/8/2020.     Sincerely,        Raquel Moon, DNP, APRN, CNM

## 2020-01-28 ENCOUNTER — HOSPITAL ENCOUNTER (OUTPATIENT)
Facility: CLINIC | Age: 30
Discharge: HOME | End: 2020-01-28
Attending: FAMILY MEDICINE | Admitting: ADVANCED PRACTICE MIDWIFE
Payer: COMMERCIAL

## 2020-01-28 ENCOUNTER — HOSPITAL ENCOUNTER (OUTPATIENT)
Facility: CLINIC | Age: 30
End: 2020-01-28
Admitting: ADVANCED PRACTICE MIDWIFE
Payer: COMMERCIAL

## 2020-01-28 VITALS
HEART RATE: 71 BPM | DIASTOLIC BLOOD PRESSURE: 87 MMHG | RESPIRATION RATE: 16 BRPM | SYSTOLIC BLOOD PRESSURE: 118 MMHG | TEMPERATURE: 98.6 F

## 2020-01-28 DIAGNOSIS — Z34.02 ENCOUNTER FOR SUPERVISION OF NORMAL FIRST PREGNANCY IN SECOND TRIMESTER: Primary | ICD-10-CM

## 2020-01-28 PROBLEM — Z36.89 ENCOUNTER FOR TRIAGE IN PREGNANT PATIENT: Status: ACTIVE | Noted: 2020-01-28

## 2020-01-28 LAB
ALBUMIN UR-MCNC: NEGATIVE MG/DL
APPEARANCE UR: CLEAR
BILIRUB UR QL STRIP: NEGATIVE
COLOR UR AUTO: ABNORMAL
GLUCOSE UR STRIP-MCNC: NEGATIVE MG/DL
HGB UR QL STRIP: NEGATIVE
KETONES UR STRIP-MCNC: NEGATIVE MG/DL
LEUKOCYTE ESTERASE UR QL STRIP: NEGATIVE
MUCOUS THREADS #/AREA URNS LPF: PRESENT /LPF
NITRATE UR QL: NEGATIVE
PH UR STRIP: 5.5 PH (ref 5–7)
RBC #/AREA URNS AUTO: <1 /HPF (ref 0–2)
SOURCE: ABNORMAL
SP GR UR STRIP: 1.01 (ref 1–1.03)
SQUAMOUS #/AREA URNS AUTO: 9 /HPF (ref 0–1)
UROBILINOGEN UR STRIP-MCNC: NORMAL MG/DL (ref 0–2)
WBC #/AREA URNS AUTO: 2 /HPF (ref 0–5)

## 2020-01-28 PROCEDURE — 99213 OFFICE O/P EST LOW 20 MIN: CPT | Performed by: ADVANCED PRACTICE MIDWIFE

## 2020-01-28 PROCEDURE — G0463 HOSPITAL OUTPT CLINIC VISIT: HCPCS

## 2020-01-28 PROCEDURE — 81001 URINALYSIS AUTO W/SCOPE: CPT | Performed by: ADVANCED PRACTICE MIDWIFE

## 2020-01-28 PROCEDURE — 40000268 ZZH STATISTIC NO CHARGES

## 2020-01-28 RX ORDER — ONDANSETRON 2 MG/ML
4 INJECTION INTRAMUSCULAR; INTRAVENOUS EVERY 6 HOURS PRN
Status: DISCONTINUED | OUTPATIENT
Start: 2020-01-28 | End: 2020-01-28 | Stop reason: HOSPADM

## 2020-01-29 NOTE — PLAN OF CARE
Data: Patient presented to [facility] at 1835   Reason for maternal/fetal assessment per patient is Abdominal Pain  .  Patient is a . Prenatal record reviewed.      OB History    Para Term  AB Living   1 0 0 0 0 0   SAB TAB Ectopic Multiple Live Births   0 0 0 0 0      # Outcome Date GA Lbr Nicolás/2nd Weight Sex Delivery Anes PTL Lv   1 Current            . Medical history:   Past Medical History:   Diagnosis Date     Migraines    . Gestational Age 21w1d. VSS. Fetal movement present. Patient denies cramping, backache, vaginal discharge, pelvic pressure, UTI symptoms, GI problems, bloody show, vaginal bleeding, edema, headache, visual disturbances, epigastric or URQ pain, rupture of membranes. Support persons  Present. Interpretation services used.   Action: Verbal consent for EFM. Triage assessment completed. EFM applied for doptones of FHR. Uterine assessment - not narayan. Fetal assessment: Presumed adequate fetal oxygenation documented (see flow record).   Response: CHRISTINE Saunders informed of arrival. Patient verbalized agreement with plan.  Patient discharged ambulatory at 1935 with instructions and prescription for support belt.

## 2020-01-29 NOTE — H&P
HOSPITAL TRIAGE NOTE  ===================    CHIEF COMPLAINT  ========================  Heike Musa is a 30 year old patient presenting today at 21w1d for evaluation of lower abdominal pain.    Patient's last menstrual period was 2019 (exact date).  Estimated Date of Delivery: 2020     HPI  ==================   Patient presented today with lower abdominal pain. She reports the pain has been present for a while now. She states it is constant pain. It is located on her pelvis, she points to her pubic symphysis area. She reports the pain is at its worse when she is laying on her side and moving back and forth. It is sometimes better when walking and sometimes worse when walking. It goes away when she is laying flat on her back. It goes away with Tylenol but after the Tylenol wares out it sometimes comes back. She was scared the pain was hurting her baby so she wanted to come in to make sure her baby was okay.   She denies pain with urination or foul smelling urine, denies constipation or diarrhea, denies vaginal discharge, itching, or irritation. She denies contractions, water leaking, or vaginal bleeding. She reports good fetal movement.   CONTRACTIONS: none  ABDOMINAL PAIN: lower mid pelvic pain   FETAL MOVEMENT: active    VAGINAL BLEEDING: none  RUPTURE OF MEMBRANES: no  PELVIC PAIN: midline pelvic pain   OTHER: none     REVIEW OF SYSTEMS  =====================  C: NEGATIVE for fever, chills  I: NEGATIVE for worrisome rashes, moles or lesions  E: NEGATIVE for vision changes or irritation  R: NEGATIVE for significant cough or SOB  CV: NEGATIVE for chest pain, palpitations or varicosities  GI: NEGATIVE for nausea, abdominal pain, heartburn, or change in bowel habits  : NEGATIVE for frequency, dysuria, or hematuria  M: NEGATIVE for significant arthralgias or myalgia  N: NEGATIVE for headache, weakness, dizziness or paresthesias  P: NEGATIVE for changes in mood or  "affect    HISTORIES  ==============  ALLERGIES:    No Known Allergies  PAST MEDICAL HISTORY  Past Medical History:   Diagnosis Date     Migraines      PARTNER: not present     FAMILY HISTORY  Family History   Problem Relation Age of Onset     Hypertension Mother      OB HISTORY  OB History    Para Term  AB Living   1 0 0 0 0 0   SAB TAB Ectopic Multiple Live Births   0 0 0 0 0      # Outcome Date GA Lbr Nicolás/2nd Weight Sex Delivery Anes PTL Lv   1 Current                EXAM  ============  /87 (BP Location: Right arm)   Pulse 71   Temp 98.6  F (37  C) (Oral)   Resp 16   LMP 2019 (Exact Date)   GENERAL APPEARANCE: healthy, alert and no distress  RESP: lungs clear to auscultation - no rales, rhonchi or wheezes  BREAST: normal without masses, tenderness or nipple discharge and no palpable axillary masses or adenopathy  CV: regular rates and rhythm, normal S1 S2, no S3 or S4 and no murmur,and no varicosities  ABDOMEN:  soft, nontender,non-tender between contractions no epigastric pain  NEURO: Denies headache, blurred vision  PSYCH: mentation appears normal. and affect normal/bright  LEGS: Reflexes normal bilaterally    CONTRACTIONS: none   FETAL HEART TONES: 140-145 with doppler  PELVIC EXAM: Patient declined pelvic exam stating \"nothing is wrong with the inside.\"    LABS:  UA and UC    DIAGNOSIS  ============  21w1d  Round ligament pain   Fetal heart doppler appropriate for 21 week fetus     PLAN  ============  Home with PTL instructions per discharge instruction form, information on round ligament and pelvic pain in pregnancy. She was already taking Tylenol which is helping. Discussed heat and cold compresses as well. DME order given for maternity support belt. Discussed to return if pain worsens. Discussed would need to do a pelvic exam if continues to rule out other reasons for discomfort. If she continues to have pain despite negative workup and no relief from tylenol and maternity belt " can refer to physical therapy as well.     Donna Saunders, SUSAN BEDOYAM

## 2020-01-29 NOTE — ED TRIAGE NOTES
Pt is five mo preg and complaining of abd pain.  L&D contacted and was told to send the Pt up.  Pt sent up via w/c

## 2020-02-17 ENCOUNTER — PRENATAL OFFICE VISIT (OUTPATIENT)
Dept: MIDWIFE SERVICES | Facility: CLINIC | Age: 30
End: 2020-02-17
Attending: ADVANCED PRACTICE MIDWIFE
Payer: COMMERCIAL

## 2020-02-17 ENCOUNTER — TRANSCRIBE ORDERS (OUTPATIENT)
Dept: MATERNAL FETAL MEDICINE | Facility: CLINIC | Age: 30
End: 2020-02-17

## 2020-02-17 ENCOUNTER — ANCILLARY PROCEDURE (OUTPATIENT)
Dept: ULTRASOUND IMAGING | Facility: CLINIC | Age: 30
End: 2020-02-17
Attending: ADVANCED PRACTICE MIDWIFE
Payer: COMMERCIAL

## 2020-02-17 VITALS
SYSTOLIC BLOOD PRESSURE: 110 MMHG | WEIGHT: 155 LBS | TEMPERATURE: 98 F | DIASTOLIC BLOOD PRESSURE: 79 MMHG | BODY MASS INDEX: 26.61 KG/M2 | HEART RATE: 94 BPM

## 2020-02-17 DIAGNOSIS — O44.42 LOW-LYING PLACENTA IN SECOND TRIMESTER: ICD-10-CM

## 2020-02-17 DIAGNOSIS — O26.90 PREGNANCY RELATED CONDITION, ANTEPARTUM: Primary | ICD-10-CM

## 2020-02-17 DIAGNOSIS — Z34.02 ENCOUNTER FOR SUPERVISION OF NORMAL FIRST PREGNANCY IN SECOND TRIMESTER: Primary | ICD-10-CM

## 2020-02-17 PROBLEM — Z36.89 ENCOUNTER FOR TRIAGE IN PREGNANT PATIENT: Status: RESOLVED | Noted: 2020-01-28 | Resolved: 2020-02-17

## 2020-02-17 LAB
GLUCOSE 1H P 50 G GLC PO SERPL-MCNC: 143 MG/DL (ref 60–129)
HGB BLD-MCNC: 12.7 G/DL (ref 11.7–15.7)

## 2020-02-17 PROCEDURE — 00000218 ZZHCL STATISTIC OBHBG - HEMOGLOBIN: Performed by: ADVANCED PRACTICE MIDWIFE

## 2020-02-17 PROCEDURE — 99207 ZZC PRENATAL VISIT: CPT | Performed by: ADVANCED PRACTICE MIDWIFE

## 2020-02-17 PROCEDURE — T1013 SIGN LANG/ORAL INTERPRETER: HCPCS | Mod: U3 | Performed by: ADVANCED PRACTICE MIDWIFE

## 2020-02-17 PROCEDURE — 36415 COLL VENOUS BLD VENIPUNCTURE: CPT | Performed by: ADVANCED PRACTICE MIDWIFE

## 2020-02-17 PROCEDURE — T1013 SIGN LANG/ORAL INTERPRETER: HCPCS | Mod: U3

## 2020-02-17 PROCEDURE — 82950 GLUCOSE TEST: CPT | Performed by: ADVANCED PRACTICE MIDWIFE

## 2020-02-17 PROCEDURE — 76815 OB US LIMITED FETUS(S): CPT | Performed by: OBSTETRICS & GYNECOLOGY

## 2020-02-17 NOTE — PROGRESS NOTES
24w0d  Fijian  here.  Here after U/s for low lying placenta, still low lying and Dr. Zeng recommended seeing  [facility] for followup.   Explained to patient. After patient left [facility] U/S should be in next 2 weeks.   Pt has good questions.   Agreed to 1 hour GCT today.   Reassured on fundal height and enc. Bath and warm packs for pelvic pain as she was [facility].   rtc in 4 weeks calvin

## 2020-02-18 ENCOUNTER — TELEPHONE (OUTPATIENT)
Dept: MIDWIFE SERVICES | Facility: CLINIC | Age: 30
End: 2020-02-18

## 2020-02-18 DIAGNOSIS — R73.09 ABNORMAL GLUCOSE: ICD-10-CM

## 2020-02-18 DIAGNOSIS — Z34.02 ENCOUNTER FOR SUPERVISION OF NORMAL FIRST PREGNANCY IN SECOND TRIMESTER: Primary | ICD-10-CM

## 2020-02-24 ENCOUNTER — PRENATAL OFFICE VISIT (OUTPATIENT)
Dept: MIDWIFE SERVICES | Facility: CLINIC | Age: 30
End: 2020-02-24
Payer: COMMERCIAL

## 2020-02-24 VITALS
OXYGEN SATURATION: 96 % | TEMPERATURE: 97.2 F | BODY MASS INDEX: 26.05 KG/M2 | HEIGHT: 64 IN | SYSTOLIC BLOOD PRESSURE: 112 MMHG | DIASTOLIC BLOOD PRESSURE: 74 MMHG | WEIGHT: 152.6 LBS | HEART RATE: 91 BPM

## 2020-02-24 DIAGNOSIS — Z34.02 ENCOUNTER FOR SUPERVISION OF NORMAL FIRST PREGNANCY IN SECOND TRIMESTER: ICD-10-CM

## 2020-02-24 DIAGNOSIS — R73.09 ABNORMAL GLUCOSE: ICD-10-CM

## 2020-02-24 DIAGNOSIS — R10.84 ABDOMINAL PAIN, GENERALIZED: Primary | ICD-10-CM

## 2020-02-24 LAB
ALBUMIN UR-MCNC: NEGATIVE MG/DL
APPEARANCE UR: CLEAR
BACTERIA #/AREA URNS HPF: ABNORMAL /HPF
BILIRUB UR QL STRIP: NEGATIVE
COLOR UR AUTO: YELLOW
GLUCOSE 1H P 100 G GLC PO SERPL-MCNC: 199 MG/DL (ref 60–179)
GLUCOSE 2H P 100 G GLC PO SERPL-MCNC: 216 MG/DL (ref 60–154)
GLUCOSE 3H P 100 G GLC PO SERPL-MCNC: 171 MG/DL (ref 60–139)
GLUCOSE P FAST SERPL-MCNC: 88 MG/DL (ref 60–94)
GLUCOSE UR STRIP-MCNC: 100 MG/DL
HGB UR QL STRIP: NEGATIVE
KETONES UR STRIP-MCNC: NEGATIVE MG/DL
LEUKOCYTE ESTERASE UR QL STRIP: ABNORMAL
NITRATE UR QL: NEGATIVE
NON-SQ EPI CELLS #/AREA URNS LPF: ABNORMAL /LPF
PH UR STRIP: 6 PH (ref 5–7)
RBC #/AREA URNS AUTO: ABNORMAL /HPF
SOURCE: ABNORMAL
SP GR UR STRIP: 1.02 (ref 1–1.03)
UROBILINOGEN UR STRIP-ACNC: 0.2 EU/DL (ref 0.2–1)
WBC #/AREA URNS AUTO: ABNORMAL /HPF

## 2020-02-24 PROCEDURE — 87086 URINE CULTURE/COLONY COUNT: CPT | Performed by: ADVANCED PRACTICE MIDWIFE

## 2020-02-24 PROCEDURE — 81001 URINALYSIS AUTO W/SCOPE: CPT | Performed by: ADVANCED PRACTICE MIDWIFE

## 2020-02-24 PROCEDURE — 82952 GTT-ADDED SAMPLES: CPT | Performed by: ADVANCED PRACTICE MIDWIFE

## 2020-02-24 PROCEDURE — 99207 ZZC PRENATAL VISIT: CPT | Performed by: ADVANCED PRACTICE MIDWIFE

## 2020-02-24 PROCEDURE — 36415 COLL VENOUS BLD VENIPUNCTURE: CPT | Performed by: ADVANCED PRACTICE MIDWIFE

## 2020-02-24 PROCEDURE — 82951 GLUCOSE TOLERANCE TEST (GTT): CPT | Performed by: ADVANCED PRACTICE MIDWIFE

## 2020-02-24 RX ORDER — PRENATAL VIT/IRON FUM/FOLIC AC 27MG-0.8MG
1 TABLET ORAL DAILY
Qty: 90 TABLET | Refills: 3 | Status: ON HOLD | OUTPATIENT
Start: 2020-02-24 | End: 2020-06-09

## 2020-02-24 ASSESSMENT — MIFFLIN-ST. JEOR: SCORE: 1392.19

## 2020-02-24 NOTE — PROGRESS NOTES
Heike Musa is here for a 3 hr GTT.  Has complaint of lower back pain, cramping and umbilicus pain.  Phone  used for H and P.  Not threatening for PTL but actually usual symptoms of pregnancy due to rectus abdomen separation and uterine stretching.  Discussed use of uterine supports.  Had questions on US so used picture with  to review low lying placenta and fibroid noted on US.  Has follow up scheduled.  Informed we would let her know her GTT results tomorrow.   ASSESSMENT: 25w0d   PLAN: RTC as scheduled for next OB appt.    CAPO

## 2020-02-25 ENCOUNTER — APPOINTMENT (OUTPATIENT)
Dept: INTERPRETER SERVICES | Facility: CLINIC | Age: 30
End: 2020-02-25
Payer: COMMERCIAL

## 2020-02-25 ENCOUNTER — TELEPHONE (OUTPATIENT)
Dept: MIDWIFE SERVICES | Facility: CLINIC | Age: 30
End: 2020-02-25

## 2020-02-25 DIAGNOSIS — O24.419 GDM (GESTATIONAL DIABETES MELLITUS): Primary | ICD-10-CM

## 2020-02-25 LAB
BACTERIA SPEC CULT: NORMAL
SPECIMEN SOURCE: NORMAL

## 2020-02-25 NOTE — TELEPHONE ENCOUNTER
----- Message from SUSAN Hill CNM sent at 2/24/2020  6:36 PM CST -----  Please call patient with Estonian .  Patient has failed her  3 hour GTT,she needs to be scheduled for diabetic education as soon as possible.   Hopefully this week?  She failed pretty spectacularly.   Thank you,   Wendy Lowery CNM

## 2020-02-25 NOTE — TELEPHONE ENCOUNTER
Discussed the need for diabetic education with the patient.  She has the phone number and said she would call.  Encouraged her to call so she can be seen within the next week.  Nidhi Watson RN

## 2020-02-26 ENCOUNTER — ALLIED HEALTH/NURSE VISIT (OUTPATIENT)
Dept: EDUCATION SERVICES | Facility: CLINIC | Age: 30
End: 2020-02-26
Payer: COMMERCIAL

## 2020-02-26 DIAGNOSIS — O24.419 GESTATIONAL DIABETES: Primary | ICD-10-CM

## 2020-02-26 PROCEDURE — T1013 SIGN LANG/ORAL INTERPRETER: HCPCS | Mod: U3

## 2020-02-26 PROCEDURE — G0108 DIAB MANAGE TRN  PER INDIV: HCPCS

## 2020-02-26 RX ORDER — LANCETS
EACH MISCELLANEOUS
Qty: 102 EACH | Refills: 4 | Status: SHIPPED | OUTPATIENT
Start: 2020-02-26 | End: 2020-07-08

## 2020-02-26 RX ORDER — BLOOD-GLUCOSE METER
1 EACH MISCELLANEOUS 4 TIMES DAILY
Qty: 1 KIT | Refills: 1 | Status: SHIPPED | OUTPATIENT
Start: 2020-02-26 | End: 2020-07-08

## 2020-02-26 NOTE — PROGRESS NOTES
Diabetes Self-Management Education & Support    Initial Gestational Diabetes Self-Management Education & Support    SUBJECTIVE/OBJECTIVE:  Presents for education related to gestational diabetes.    Accompanied by: Self,   Diabetes management related comments/concerns: none prior to education    Cultural Influences/Ethnic Background:  Mexican    Estimated Date of Delivery: 2020    1 hour OGTT  Lab Results   Component Value Date    GLU1 143 (H) 2020       3 hour OGTT    Fasting  Lab Results   Component Value Date    GLF 88 2020       1 hour  Lab Results   Component Value Date    GL1 199 (H) 2020       2 hour  Lab Results   Component Value Date    GL2 216 (H) 2020       3 hour  Lab Results   Component Value Date    GL3 171 (H) 2020       Lifestyle and Health Behaviors:  Exercise:: (not assessed )  Meals include: Breakfast, Lunch, Dinner  Beverages: Water, Tea  Cultural/Synagogue diet restrictions?: No  Biggest challenges to healthy eating: Portion control(large portions in culture)  Pre-stevie vitamin?: Yes(tablet)  Supplements?: Yes(vit d)  Experiencing nausea?: No    No family history of diabetes    Lunch 2 injera, water, oil, tea with sugar- prior to appointment    Healthy Coping:  Emotional response to diabetes: Shock/Denial/Bargaining, Concern for health and well-being, Fear/Anxiety  Informal Support system:: Family  Stage of change: PREPARATION (Decided to change - considering how)    Current Management:  Taking medications for gestational diabetes?: No  Difficulty affording diabetes management supplies?: No    ASSESSMENT:  Patient does not drink juice, soda.  Today's lunch intake does not meet recommended intake, but post lunch reading was in target.  Patient had many questions, explained information many times in different ways to ensure understanding.    INTERVENTION:  Patient was instructed on Accu-Chek Guide meter and was able to provide an accurate return  demonstration. Patient's blood glucose reading today was 113 mg/dL.    Educational topics covered today:  GDM diagnosis, pathophysiology, Risks and Complications of GDM, Means of controlling GDM, Using a Blood Glucose Monitor, Blood Glucose Goals, Logging and Interpreting Glucose Results, Ketone Testing, When to Call a Diabetes Educator or OB Provider, Healthy Eating During Pregnancy, Counting Carbohydrates, Meal Planning for GDM, and Physical Activity    Educational materials provided today:   Jennifer Understanding Gestational Diabetes  GDM Log Book  Sharps Disposal  Care After Delivery  Accu-Chek Guide meter kit    Pt verbalized understanding of concepts discussed and recommendations provided today.     PLAN:  Check glucose 4 times daily, before breakfast and 1 hour after each meal.     Check Ketones daily for one week, if negative, reduce testing to once a week.     Physical activity recommended: Did not discuss due to difficulty understanding meals and BG checks..    Meal plan: 30g carbs at breakfast, 45-60g carbs at lunch, 45-60g carbs at supper, 15-30g carbs at 3 snacks a day.  Follow consistent CHO meal plan, eat CHO and protein/fat at all meals/snacks.    Call/e-mail/I Do Now I Don'thart message diabetes educator if 3 or more blood sugars are above the goal in 1 week, if ketones are positive, or with questions/concerns.    Follow up scheduled 3/4/20    Chel Prescott MS, RD, LD, CDE    Time Spent: 100 minutes  Encounter Type: Individual    Any diabetes medication dose changes were made via the CDE Protocol and Collaborative Practice Agreement with the patient's OB/GYN provider. A copy of this encounter was shared with the provider.

## 2020-02-26 NOTE — LETTER
2020         RE: Heike Musa  [address]  [facility] [address]        Dear Colleague,    Thank you for referring your patient, Heike Musa, to the [facility]. Please see a copy of my visit note below.    Diabetes Self-Management Education & Support    Initial Gestational Diabetes Self-Management Education & Support    SUBJECTIVE/OBJECTIVE:  Presents for education related to gestational diabetes.    Accompanied by: Self,   Diabetes management related comments/concerns: none prior to education    Cultural Influences/Ethnic Background:  Cymraes    Estimated Date of Delivery: 2020    1 hour OGTT  Lab Results   Component Value Date    GLU1 143 (H) 2020       3 hour OGTT    Fasting  Lab Results   Component Value Date    GLF 88 2020       1 hour  Lab Results   Component Value Date    GL1 199 (H) 2020       2 hour  Lab Results   Component Value Date    GL2 216 (H) 2020       3 hour  Lab Results   Component Value Date    GL3 171 (H) 2020       Lifestyle and Health Behaviors:  Exercise:: (not assessed )  Meals include: Breakfast, Lunch, Dinner  Beverages: Water, Tea  Cultural/Zoroastrianism diet restrictions?: No  Biggest challenges to healthy eating: Portion control(large portions in culture)  Pre- vitamin?: Yes(tablet)  Supplements?: Yes(vit d)  Experiencing nausea?: No    No family history of diabetes    Lunch 2 injera, water, oil, tea with sugar- prior to appointment    Healthy Coping:  Emotional response to diabetes: Shock/Denial/Bargaining, Concern for health and well-being, Fear/Anxiety  Informal Support system:: Family  Stage of change: PREPARATION (Decided to change - considering how)    Current Management:  Taking medications for gestational diabetes?: No  Difficulty affording diabetes management supplies?: No    ASSESSMENT:  Patient does not drink juice, soda.  Today's lunch intake does not meet recommended intake, but post  lunch reading was in target.  Patient had many questions, explained information many times in different ways to ensure understanding.    INTERVENTION:  Patient was instructed on Accu-Chek Guide meter and was able to provide an accurate return demonstration. Patient's blood glucose reading today was 113 mg/dL.    Educational topics covered today:  GDM diagnosis, pathophysiology, Risks and Complications of GDM, Means of controlling GDM, Using a Blood Glucose Monitor, Blood Glucose Goals, Logging and Interpreting Glucose Results, Ketone Testing, When to Call a Diabetes Educator or OB Provider, Healthy Eating During Pregnancy, Counting Carbohydrates, Meal Planning for GDM, and Physical Activity    Educational materials provided today:   Jennifer Understanding Gestational Diabetes  GDM Log Book  Sharps Disposal  Care After Delivery  Accu-Chek Guide meter kit    Pt verbalized understanding of concepts discussed and recommendations provided today.     PLAN:  Check glucose 4 times daily, before breakfast and 1 hour after each meal.     Check Ketones daily for one week, if negative, reduce testing to once a week.     Physical activity recommended: Did not discuss due to difficulty understanding meals and BG checks..    Meal plan: 30g carbs at breakfast, 45-60g carbs at lunch, 45-60g carbs at supper, 15-30g carbs at 3 snacks a day.  Follow consistent CHO meal plan, eat CHO and protein/fat at all meals/snacks.    Call/e-mail/MyChart message diabetes educator if 3 or more blood sugars are above the goal in 1 week, if ketones are positive, or with questions/concerns.    Follow up scheduled 3/4/20    Chel Prescott MS, RD, LD, CDE    Time Spent: 100 minutes  Encounter Type: Individual    Any diabetes medication dose changes were made via the CDE Protocol and Collaborative Practice Agreement with the patient's OB/GYN provider. A copy of this encounter was shared with the provider.

## 2020-02-26 NOTE — PATIENT INSTRUCTIONS
Sammarinese language line- [phone] , then call [phone]    1. Check blood sugar 4 times a day, before breakfast and 1 hour after the start of each meal.     2. Check urine ketones when you wake up every morning for 7 days. If negative everyday, reduce testing to once a week.    3. Follow the recommended meal plan: eat something every 2-3 hours, include protein/fat and carbohydrate at every meal and snack, have 30g carbs at breakfast, 45-60g carbs at lunch, 45-60g carbs at supper, 15-30g carbs at 3 snacks per day.     4. Add activity to every day, try walking or being active after each meal to help control blood sugar levels.    5.Call or e-mail educator if 3 or more blood sugars are above goal in 1 week. Call or e-mail with questions or concerns.    [facility] for the [facility]:  For Your Diabetes Education or Nutrition Appointments Call:  [phone]   For Diabetes Education and Nutrition Related Questions:   Phone: [phone]  E-mail: [email or web address]  Fax: [phone]   If you need a medication refill please contact your pharmacy. Please allow 3 business days for your refills to be completed.

## 2020-02-28 PROBLEM — O24.410 DIET CONTROLLED GESTATIONAL DIABETES MELLITUS (GDM) IN SECOND TRIMESTER: Status: ACTIVE | Noted: 2020-02-17

## 2020-03-04 ENCOUNTER — ALLIED HEALTH/NURSE VISIT (OUTPATIENT)
Dept: EDUCATION SERVICES | Facility: CLINIC | Age: 30
End: 2020-03-04
Payer: COMMERCIAL

## 2020-03-04 DIAGNOSIS — O24.419 GESTATIONAL DIABETES: Primary | ICD-10-CM

## 2020-03-04 PROCEDURE — G0108 DIAB MANAGE TRN  PER INDIV: HCPCS

## 2020-03-04 NOTE — PATIENT INSTRUCTIONS
1. Check glucose 4 times daily, before breakfast daily and 1 hour after each meal, as recommended.    2. Check ketones daily or once a week after they have been negative for 7 days in a row. If ketones are positive, let your diabetes educator know and continue to check daily until they are negative for 7 days in a row. Check 1 time per week    3. Continue with recommended physical activity.    4. Continue to follow recommended meal plan: 30g carbs at breakfast, 45-60g carbs at lunch, 45-60g carbs at supper, 15-30g carbs at snacks.  Follow consistent CHO meal plan, eat CHO and protein/fat at all meals/snacks.    5. Follow-up with OB doctor as recommended.    6. Call/e-mail diabetes educator if 3 or more blood sugars are above the goal in 1 week or if ketones are positive.       AFTER YOU DELIVER:  - Continue with healthy eating and physical activity to get back to your pre-pregnancy weight.   - Have a follow-up 2-hour Glucose Tolerance Test at your 6-week post-partum check-up.   - Have your fasting blood sugar checked once a year.  - Plan ahead for future pregnancies - eat healthy, keep active, work with your doctor to check for gestational diabetes early on in the pregnancy and check blood sugars as recommended by your doctor.       E-mail picture on 3/11/20 to [email or web address]

## 2020-03-04 NOTE — LETTER
3/4/2020         RE: Heike Musa  [address]  [facility] [address]        Dear Colleague,    Thank you for referring your patient, Heike Musa, to the [facility]. Please see a copy of my visit note below.    Diabetes Self-Management Education & Support  Follow-up Gestational Diabetes Self-Management Education & Support    SUBJECTIVE/OBJECTIVE:  Presents for education related to gestational diabetes.    Accompanied by: Self,   Diabetes management related comments/concerns: (What happens if my blood sugar is high?)    Cultural Influences/Ethnic Background:  [place]uneian    LMP 2019 (Exact Date)     Wt Readings from Last 15 Encounters:   20 69.2 kg (152 lb 9.6 oz)   20 70.3 kg (155 lb)   20 68 kg (150 lb)   20 67.1 kg (148 lb)   19 66 kg (145 lb 8 oz)   19 66.2 kg (146 lb)   12/10/19 65.7 kg (144 lb 12.8 oz)   18 59.4 kg (131 lb 1 oz)       Weight gain 8 lbs at 26w2d weeks gestation.    Estimated Date of Delivery: 2020    Blood Glucose/Ketone Log book: brought to visit forgot to copy.    Lifestyle and Health Behaviors:  Exercise:: Currently not exercising  Barrier to exercise: None(Plans to start walking this week)  Meals include: Breakfast, Lunch, Dinner, Afternoon Snack  Beverages: Water  Cultural/Evangelical diet restrictions?: No  Biggest challenges to healthy eating: Portion control  Pre-stevie vitamin?: Yes  Supplements?: Yes  Experiencing nausea?: No    I changed my diet.  I am only having 1-2 times per week spaghetti, rice, and white flour, I'm now eating more vegetales, salad, fruits. Grains with fiber.    Bedtime snack-none at this time  Dinner-7pm  Bed- 10:30pm  Wake up-6am        Caryl=gillian Palmer=Davide    Healthy Coping:  Emotional response to diabetes: Ready to learn, Concern for health and well-being  Informal Support system:: Family, Friends  Stage of change: ACTION (Actively working towards  Patient: Lucas Brown    Procedure(s):  COMBINED INCISION AND DRAINAGE - Wound Class: II-Clean Contaminated    Diagnosis:Abdomen Wall Abscess  Diagnosis Additional Information: No value filed.    Anesthesia Type:  General, RSI, ETT    Note:  Anesthesia Post Evaluation    Patient location during evaluation: PACU  Patient participation: Able to fully participate in evaluation  Level of consciousness: awake and alert  Pain management: adequate  Airway patency: patent  Cardiovascular status: acceptable  Respiratory status: acceptable  Hydration status: acceptable  PONV: none             Last vitals:  Vitals:    10/13/17 1730 10/13/17 1745 10/13/17 1800   BP: 127/69 131/73 132/66   Pulse:      Resp: 20 20 20   Temp: 36.2  C (97.1  F) 37.2  C (98.9  F) 37.2  C (98.9  F)   SpO2: 96% 97% 96%         Electronically Signed By: Diallo Contreras MD  October 13, 2017  7:17 PM   change)    Current Management:  Taking medications for gestational diabetes?: No  Difficulty affording diabetes management supplies?: No    ASSESSMENT:  Ketones: negative.   Fasting blood glucoses: 88% in target, one elevated reading(97).  After breakfast: 100% in target.  After lunch: 100% in target.  After dinner: 100% in target.    Blood sugars in target.  One elevated fasting reading linked to later than usual dinner which was only Wheat vs. Mixed meal of vegetables, protein, beans, and pasta.  Highest post lunch reading was 140, which additionally included corn meal with milk and sugar which she didn't record. Overall blood sugars in target with current eating pattern.  She is having cereal (cherioos) for breakfast via [facility] but is staying in target after breakfast.    INTERVENTION:  Used [facility] guide to show patient which ones contain added sugar to avoid.  Discussed that she can continue her breakfast until she sees elevated post meal readings at which time she should switch back to Injera and fish OR eggs for breakfast.   Showed patient how to change batteries on meter as it has been not working sometimes.  Reviewed how to call pharmacy to get refills of testing supplies.    Due to language and understanding barrier did not teach insulin.  Informed patient if this is needed we would schedule a follow up appointment for instruction.      Educational topics covered today:  What to expect after delivery, Future testing for Type 2 diabetes (2 hour OGTT at 6 week post-partum check-up and annual fasting blood glucose level), Risk of GDM and planning ahead for future pregnancies, Recommended lifestyle interventions for reducing the risk of Type 2 Diabetes, When to Call a Diabetes Educator or OB Provider    Educational Materials provided today:  [facility] Preventing Diabetes    PLAN:  Check glucose 4 times daily.  Check ketones once a week when readings are consistently negative.  Start walking daily for physical  activity.  Continue to follow recommended meal plan: 30g carbs at breakfast, 45-60g carbs at lunch, 45-60g carbs at supper, 15-30g carbs at snacks.  Follow consistent CHO meal plan, eat CHO and protein/fat at all meals/snacks.    Patient to E-mail picture of blood sugar log book 3/10-3/11 and CDE to call back.    Chel Prescott MS, RD, LD, CDE    Time Spent: 60 minutes  Encounter Type: Individual    Any diabetes medication dose changes were made via the CDE Protocol and Collaborative Practice Agreement with the patient's OB/GYN provider. A copy of this encounter was shared with the provider.

## 2020-03-04 NOTE — PROGRESS NOTES
Diabetes Self-Management Education & Support  Follow-up Gestational Diabetes Self-Management Education & Support    SUBJECTIVE/OBJECTIVE:  Presents for education related to gestational diabetes.    Accompanied by: Self,   Diabetes management related comments/concerns: (What happens if my blood sugar is high?)    Cultural Influences/Ethnic Background:  Turkmen    LMP 2019 (Exact Date)     Wt Readings from Last 15 Encounters:   20 69.2 kg (152 lb 9.6 oz)   20 70.3 kg (155 lb)   20 68 kg (150 lb)   20 67.1 kg (148 lb)   19 66 kg (145 lb 8 oz)   19 66.2 kg (146 lb)   12/10/19 65.7 kg (144 lb 12.8 oz)   18 59.4 kg (131 lb 1 oz)       Weight gain 8 lbs at 26w2d weeks gestation.    Estimated Date of Delivery: 2020    Blood Glucose/Ketone Log book: brought to visit forgot to copy.    Lifestyle and Health Behaviors:  Exercise:: Currently not exercising  Barrier to exercise: None(Plans to start walking this week)  Meals include: Breakfast, Lunch, Dinner, Afternoon Snack  Beverages: Water  Cultural/Restorationist diet restrictions?: No  Biggest challenges to healthy eating: Portion control  Pre-stevie vitamin?: Yes  Supplements?: Yes  Experiencing nausea?: No    I changed my diet.  I am only having 1-2 times per week spaghetti, rice, and white flour, I'm now eating more vegetales, salad, fruits. Grains with fiber.    Bedtime snack-none at this time  Dinner-7pm  Bed- 10:30pm  Wake up-6am        Caryl=gillian Palmer=Davide    Healthy Coping:  Emotional response to diabetes: Ready to learn, Concern for health and well-being  Informal Support system:: Family, Friends  Stage of change: ACTION (Actively working towards change)    Current Management:  Taking medications for gestational diabetes?: No  Difficulty affording diabetes management supplies?: No    ASSESSMENT:  Ketones: negative.   Fasting blood glucoses: 88% in target, one elevated reading(97).  After breakfast:  100% in target.  After lunch: 100% in target.  After dinner: 100% in target.    Blood sugars in target.  One elevated fasting reading linked to later than usual dinner which was only Wheat vs. Mixed meal of vegetables, protein, beans, and pasta.  Highest post lunch reading was 140, which additionally included corn meal with milk and sugar which she didn't record. Overall blood sugars in target with current eating pattern.  She is having cereal (cherioos) for breakfast via [place] but is staying in target after breakfast.    INTERVENTION:  Used [facility] guide to show patient which ones contain added sugar to avoid.  Discussed that she can continue her breakfast until she sees elevated post meal readings at which time she should switch back to Injera and fish OR eggs for breakfast.   Showed patient how to change batteries on meter as it has been not working sometimes.  Reviewed how to call pharmacy to get refills of testing supplies.    Due to language and understanding barrier did not teach insulin.  Informed patient if this is needed we would schedule a follow up appointment for instruction.      Educational topics covered today:  What to expect after delivery, Future testing for Type 2 diabetes (2 hour OGTT at 6 week post-partum check-up and annual fasting blood glucose level), Risk of GDM and planning ahead for future pregnancies, Recommended lifestyle interventions for reducing the risk of Type 2 Diabetes, When to Call a Diabetes Educator or OB Provider    Educational Materials provided today:  Jennifer Preventing Diabetes    PLAN:  Check glucose 4 times daily.  Check ketones once a week when readings are consistently negative.  Start walking daily for physical activity.  Continue to follow recommended meal plan: 30g carbs at breakfast, 45-60g carbs at lunch, 45-60g carbs at supper, 15-30g carbs at snacks.  Follow consistent CHO meal plan, eat CHO and protein/fat at all meals/snacks.    Patient to E-mail picture of blood  sugar log book 3/10-3/11 and CDE to call back.    Chel Prescott MS, RD, LD, CDE    Time Spent: 60 minutes  Encounter Type: Individual    Any diabetes medication dose changes were made via the CDE Protocol and Collaborative Practice Agreement with the patient's OB/GYN provider. A copy of this encounter was shared with the provider.

## 2020-03-05 ENCOUNTER — PRE VISIT (OUTPATIENT)
Dept: MATERNAL FETAL MEDICINE | Facility: CLINIC | Age: 30
End: 2020-03-05

## 2020-03-09 ENCOUNTER — OFFICE VISIT (OUTPATIENT)
Dept: MATERNAL FETAL MEDICINE | Facility: CLINIC | Age: 30
End: 2020-03-09
Attending: ADVANCED PRACTICE MIDWIFE
Payer: COMMERCIAL

## 2020-03-09 ENCOUNTER — OFFICE VISIT (OUTPATIENT)
Dept: INTERPRETER SERVICES | Facility: CLINIC | Age: 30
End: 2020-03-09
Payer: COMMERCIAL

## 2020-03-09 ENCOUNTER — HOSPITAL ENCOUNTER (OUTPATIENT)
Dept: ULTRASOUND IMAGING | Facility: CLINIC | Age: 30
End: 2020-03-09
Attending: ADVANCED PRACTICE MIDWIFE
Payer: COMMERCIAL

## 2020-03-09 ENCOUNTER — PRENATAL OFFICE VISIT (OUTPATIENT)
Dept: MIDWIFE SERVICES | Facility: CLINIC | Age: 30
End: 2020-03-09
Payer: COMMERCIAL

## 2020-03-09 VITALS
BODY MASS INDEX: 26.66 KG/M2 | TEMPERATURE: 97.9 F | WEIGHT: 155.3 LBS | SYSTOLIC BLOOD PRESSURE: 108 MMHG | DIASTOLIC BLOOD PRESSURE: 77 MMHG | HEART RATE: 80 BPM

## 2020-03-09 DIAGNOSIS — O44.40 ENCOUNTER FOR REPEAT ULTRASOUND FOR LOW LYING PLACENTA, ANTEPARTUM: Primary | ICD-10-CM

## 2020-03-09 DIAGNOSIS — Z34.83 ENCOUNTER FOR SUPERVISION OF OTHER NORMAL PREGNANCY, THIRD TRIMESTER: Primary | ICD-10-CM

## 2020-03-09 DIAGNOSIS — O26.90 PREGNANCY RELATED CONDITION, ANTEPARTUM: ICD-10-CM

## 2020-03-09 PROCEDURE — 76811 OB US DETAILED SNGL FETUS: CPT

## 2020-03-09 PROCEDURE — T1013 SIGN LANG/ORAL INTERPRETER: HCPCS | Mod: U3,ZF

## 2020-03-09 PROCEDURE — 99207 ZZC PRENATAL VISIT: CPT | Performed by: ADVANCED PRACTICE MIDWIFE

## 2020-03-09 NOTE — PROGRESS NOTES
27w0d  Heike is here today with [facility] , Brittany. She is here after M visit to confirm placenta has safe margin for vaginal delivery. She reports feeling well and good fetal movement. Having a boy. Her glucose log looks great. Denies water leaking, vaginal bleeding, decreased fetal movement, contraction pain, or other concerns.  Danger signs discussed.   Knows when to call triage and has phone numbers to call.   Continue to submit weekly glucose logs and RTC in 4 weeks.  Seen today with Aishwarya Macias CNM  Signed by Donna MARLOW    I was present with the CHRISTINE student who participated in the service and in the documentation of the services provided. I have verified the history and personally performed the physical exam and medical decision making, as documented by the student and edited by me.     Aishwarya Macias CNM, SUSAN KING CNM

## 2020-03-09 NOTE — PROGRESS NOTES
Please refer to ultrasound report under 'Imaging' Studies of 'Chart Review' tabs.    Sumit Marquez M.D.

## 2020-03-09 NOTE — NURSING NOTE
Brittany Munoz #[id number] with [facility]  Services present throughout appointment.  Ileana Hirsch RN

## 2020-03-24 ENCOUNTER — APPOINTMENT (OUTPATIENT)
Dept: INTERPRETER SERVICES | Facility: CLINIC | Age: 30
End: 2020-03-24
Payer: COMMERCIAL

## 2020-03-24 ENCOUNTER — TELEPHONE (OUTPATIENT)
Dept: MIDWIFE SERVICES | Facility: CLINIC | Age: 30
End: 2020-03-24

## 2020-03-24 NOTE — TELEPHONE ENCOUNTER
29w1d  Sb  on phone to interprete.  Patient states having constant pain in legs, lower abdomen, pelvic bone area, difficult to change positions, pain does not come and go is more constant.  It is helped with tylanol. Patient did not fall or have injury in last few days.  + fetal movement  Denies any UTI symptoms  Denies any vaginal symptoms, no odor, bleeding or vaginal discharge.  States difficult to move around.  Patient denies any fever.    P' discussed at length patient pain.  After long discussion may be muscle pains due to rapid growth or fetal position.  Reviewed with patient to increase tylanol to 650 mg every 4-6 hours and may take 975 mg at night.  Enc. Pt to take consistantly for 24 hrs. Even if feeling better and to enc. To soak in tub 2 x per day for 30 minutes.    Will make appt with patient for tomorrow at 1pm.  Will have RN triage line call her around 11am to see if she wants to keep that appt. Reviewed that if she feels better she does not need to be seen in clinic, if patient would prefer to keep that appt she can come into clinic.  She will let RN know around 11am.   Wendy Lowery CNM

## 2020-03-24 NOTE — TELEPHONE ENCOUNTER
Pt called with naty .  She is having a lot of abdominal pain and pain that is spreading to her legs and she cannot walk because it hurts so much.  Pain started yesterday and it is not getting worse, but it is definitely not getting better.  Unsure whether she should be seen in clinic or whether you want to just talk to her to do phone eval.  Can you please call her to discuss further?  ( is [phone].)  Nidhi Watson RN

## 2020-03-25 ENCOUNTER — HOSPITAL ENCOUNTER (OUTPATIENT)
Facility: CLINIC | Age: 30
End: 2020-03-25
Admitting: ADVANCED PRACTICE MIDWIFE
Payer: COMMERCIAL

## 2020-03-25 ENCOUNTER — HOSPITAL ENCOUNTER (OUTPATIENT)
Facility: CLINIC | Age: 30
Discharge: HOME | End: 2020-03-25
Attending: ADVANCED PRACTICE MIDWIFE | Admitting: ADVANCED PRACTICE MIDWIFE
Payer: COMMERCIAL

## 2020-03-25 DIAGNOSIS — K63.89 GASEOUS DISTENTION OF INTESTINE DETERMINED BY X-RAY: Primary | ICD-10-CM

## 2020-03-25 DIAGNOSIS — K63.9 BOWEL TROUBLE: ICD-10-CM

## 2020-03-25 PROBLEM — Z36.89 ENCOUNTER FOR TRIAGE IN PREGNANT PATIENT: Status: ACTIVE | Noted: 2020-03-25

## 2020-03-25 LAB
ALBUMIN UR-MCNC: NEGATIVE MG/DL
APPEARANCE UR: CLEAR
BACTERIA #/AREA URNS HPF: ABNORMAL /HPF
BILIRUB UR QL STRIP: NEGATIVE
COLOR UR AUTO: ABNORMAL
ERYTHROCYTE [DISTWIDTH] IN BLOOD BY AUTOMATED COUNT: 12.2 % (ref 10–15)
GLUCOSE UR STRIP-MCNC: NEGATIVE MG/DL
HCT VFR BLD AUTO: 40.1 % (ref 35–47)
HGB BLD-MCNC: 13.6 G/DL (ref 11.7–15.7)
HGB UR QL STRIP: NEGATIVE
KETONES UR STRIP-MCNC: NEGATIVE MG/DL
LEUKOCYTE ESTERASE UR QL STRIP: ABNORMAL
MCH RBC QN AUTO: 30.8 PG (ref 26.5–33)
MCHC RBC AUTO-ENTMCNC: 33.9 G/DL (ref 31.5–36.5)
MCV RBC AUTO: 91 FL (ref 78–100)
MUCOUS THREADS #/AREA URNS LPF: PRESENT /LPF
NITRATE UR QL: NEGATIVE
PH UR STRIP: 6 PH (ref 5–7)
PLATELET # BLD AUTO: 217 10E9/L (ref 150–450)
RBC # BLD AUTO: 4.42 10E12/L (ref 3.8–5.2)
RBC #/AREA URNS AUTO: 0 /HPF (ref 0–2)
RENAL EPI CELLS #/AREA URNS HPF: <1 /HPF
SOURCE: ABNORMAL
SP GR UR STRIP: 1.01 (ref 1–1.03)
SQUAMOUS #/AREA URNS AUTO: 9 /HPF (ref 0–1)
T PALLIDUM AB SER QL: NONREACTIVE
UROBILINOGEN UR STRIP-MCNC: NORMAL MG/DL (ref 0–2)
WBC # BLD AUTO: 11.2 10E9/L (ref 4–11)
WBC #/AREA URNS AUTO: 4 /HPF (ref 0–5)

## 2020-03-25 PROCEDURE — 87086 URINE CULTURE/COLONY COUNT: CPT | Performed by: ADVANCED PRACTICE MIDWIFE

## 2020-03-25 PROCEDURE — 25800030 ZZH RX IP 258 OP 636

## 2020-03-25 PROCEDURE — 86780 TREPONEMA PALLIDUM: CPT | Performed by: ADVANCED PRACTICE MIDWIFE

## 2020-03-25 PROCEDURE — 81001 URINALYSIS AUTO W/SCOPE: CPT | Performed by: ADVANCED PRACTICE MIDWIFE

## 2020-03-25 PROCEDURE — 99213 OFFICE O/P EST LOW 20 MIN: CPT | Performed by: ADVANCED PRACTICE MIDWIFE

## 2020-03-25 PROCEDURE — 96360 HYDRATION IV INFUSION INIT: CPT

## 2020-03-25 PROCEDURE — G0463 HOSPITAL OUTPT CLINIC VISIT: HCPCS | Mod: 25

## 2020-03-25 PROCEDURE — 25000132 ZZH RX MED GY IP 250 OP 250 PS 637: Performed by: ADVANCED PRACTICE MIDWIFE

## 2020-03-25 PROCEDURE — 85027 COMPLETE CBC AUTOMATED: CPT | Performed by: ADVANCED PRACTICE MIDWIFE

## 2020-03-25 RX ORDER — SODIUM CHLORIDE, SODIUM LACTATE, POTASSIUM CHLORIDE, CALCIUM CHLORIDE 600; 310; 30; 20 MG/100ML; MG/100ML; MG/100ML; MG/100ML
INJECTION, SOLUTION INTRAVENOUS
Status: COMPLETED
Start: 2020-03-25 | End: 2020-03-25

## 2020-03-25 RX ORDER — SIMETHICONE 80 MG
80 TABLET,CHEWABLE ORAL EVERY 6 HOURS PRN
Qty: 60 TABLET | Refills: 1 | Status: ON HOLD | OUTPATIENT
Start: 2020-03-25 | End: 2020-06-09

## 2020-03-25 RX ORDER — SIMETHICONE 80 MG
160 TABLET,CHEWABLE ORAL ONCE
Status: COMPLETED | OUTPATIENT
Start: 2020-03-25 | End: 2020-03-25

## 2020-03-25 RX ADMIN — SODIUM CHLORIDE, POTASSIUM CHLORIDE, SODIUM LACTATE AND CALCIUM CHLORIDE 1000 ML: 600; 310; 30; 20 INJECTION, SOLUTION INTRAVENOUS at 05:37

## 2020-03-25 RX ADMIN — SIMETHICONE CHEW TAB 80 MG 160 MG: 80 TABLET ORAL at 06:35

## 2020-03-25 NOTE — PROGRESS NOTES
"CHIEF COMPLAINT  ========================  Heike Musa is a 30 year old patient presenting today at 29w2d for evaluation of abdominal pain x 1 day that has gotten worse overtime until this am she came to triage.  She is here today by herself.     was obtained by IPad during interview to insure patient understanding of the instructions and counseling.    Patient's last menstrual period was 09/02/2019 (exact date).  Estimated Date of Delivery: Estimated Date of Delivery: Jun 8, 2020       HPI  ==================   Pt has been recently started on a GDM diet.  Noted in her diet log is a increase in legumes and pasta.  Blood sugars are meeting expectations.   She denies and bowel issues of diarrhea or constipation.  Pain is in L side.  When asked to describe pain she said it was like \"plastic\".  Further explained it was like \"air in plastic\"  Denied that it was like her menses.    CONTRACTIONS: mild, cramping and uterine irritability   ABDOMINAL PAIN: cramping and moderate  FETAL MOVEMENT: active and that makes pain worse.  VAGINAL BLEEDING: none  RUPTURE OF MEMBRANES: no  PELVIC PAIN: none  OTHER: Patient declines pelvic exam.  Had declined transvag US to confirm placenta previa resolved at [facility] also.  Low suspecion of PTL based on physical exam and pain pattern.    REVIEW OF SYSTEMS  =====================  C: NEGATIVE for fever, chills  I: NEGATIVE for worrisome rashes, moles or lesions  E: NEGATIVE for vision changes or irritation  R: NEGATIVE for significant cough or SOB  CV: NEGATIVE for chest pain, palpitations or varicosities  GI: POSITIVE for abdominal pain LLQ  : NEGATIVE for frequency, dysuria, or hematuria  M: NEGATIVE for significant arthralgias or myalgia  N: NEGATIVE for headache, weakness, dizziness or paresthesias  P: NEGATIVE for changes in mood or affect  HISTORIES  ==============  ALLERGIES:  No Known Allergies  PAST MEDICAL HISTORY  Past Medical History:   Diagnosis Date     Migraines  " "    FAMILY HISTORY  Family History   Problem Relation Age of Onset     Hypertension Mother      OB HISTORY  OB History    Para Term  AB Living   1 0 0 0 0 0   SAB TAB Ectopic Multiple Live Births   0 0 0 0 0      # Outcome Date GA Lbr Nicolás/2nd Weight Sex Delivery Anes PTL Lv   1 Current                EXAM  ============  Vital signs stable, afebrile and BP is   BP Readings from Last 3 Encounters:   20 108/77   20 112/74   20 110/79     Vital signs:                         Estimated body mass index is 26.66 kg/m  as calculated from the following:    Height as of 20: 1.626 m (5' 4\").    Weight as of 3/9/20: 70.4 kg (155 lb 4.8 oz).    LMP 2019 (Exact Date)      Afebrile at home prior to coming in.  Has been feeling well otherwise.      GENERAL APPEARANCE: healthy, alert but grimacing with pain during conversation related to movement.    RESP: lungs clear to auscultation - no rales, rhonchi or wheezes  BREAST: normal without masses, tenderness or nipple discharge and no palpable axillary masses or adenopathy  CV: regular rates and rhythm, normal S1 S2, no S3 or S4 and no murmur,and no varicosities  ABDOMEN:   Firm with guarding in all 4 quadrants but most noted in LLQ.  Tympanic percussion in all 4 quadrants.  Uterine irritability on monitor pattern.       NEURO: Denies headache, blurred vision  PSYCH: mentation appears normal. and affect normal/bright  LEGS: Reflexes normal bilaterally  CONTRACTIONS:  EVERY 3 MINUTES  mild, cramping and uterine irritability  FETAL HEART TONES:  140 baseline FHT's with moderate variability for 29 week fetusl     Bedside US confirms cephalic  EFW:2 1/2 #.  PELVIC EXAM: Declined by patient and based on exam low suspecion for PTL.    PRESENTATION: VERTEX  BLOOD: no  DISCHARGE: none  STERILE SPEC EXAM- NOT DONE  ROM: No    AMNISURE not done    LABS:  UA, UC, UT and GBS    CBC RESULTS:   Recent Labs   Lab Test 20  0535   WBC 11.2*   RBC 4.42 "   HGB 13.6   HCT 40.1   MCV 91   MCH 30.8   MCHC 33.9   RDW 12.2        No components found for: URINE      DIAGNOSIS  ===========  29w2d, Bowel distension from gas.  Mild ctx noted but palpate mild.    I have given due consideration to the possibility of uterine fibroid breakdown, PTL, abruption, bowel obstruction, infection but I feel that diagnosis is unlikely based on a careful history and physical examination and no CBC in pregnancy.    PLAN  ===========  Home with  instructions per discharge instruction form, Medications given Simethacon and Prescriptions given. Simethicone 80 q 6 hrs.  IV of 1 liter of RL given for hydration.    GDM mgt

## 2020-03-25 NOTE — PLAN OF CARE
Data: [facility].   Reason for maternal/fetal assessment per patient is Abdominal Pain  . Patient is a . Prenatal record reviewed.      OB History    Para Term  AB Living   1 0 0 0 0 0   SAB TAB Ectopic Multiple Live Births   0 0 0 0 0      # Outcome Date GA Lbr Nicolás/2nd Weight Sex Delivery Anes PTL Lv   1 Current               Medical History:   Past Medical History:   Diagnosis Date     Migraines    . Gestational Age 29w2d. VSS. Cervix: not examined.  Fetal movement present. Patient denies cramping, backache, vaginal discharge, pelvic pressure, UTI symptoms, GI problems, bloody show, vaginal bleeding, edema, headache, visual disturbances, epigastric or URQ pain, abdominal pain, rupture of membranes. Mother present for support  Action: Verbal consent for EFM. Triage assessment completed. EFM applied for FHT. Uterine assessment for contractions. Fetal assessment: Presumed adequate fetal oxygenation documented (see flow record). Response: Parish Chaudhary CNM informed of pt. Arrival and chief complaint. Plan per provider is IV fluid bolus, labs, urine analysis, and simethicone for gas pain. Patient verbalized understanding of education and verbalized agreement with plan.

## 2020-03-25 NOTE — PLAN OF CARE
Patient discharge instructions reviewed with her with . Discharged at 0800. She understands the plan to RTC on April 9.

## 2020-03-26 LAB
BACTERIA SPEC CULT: NO GROWTH
Lab: NORMAL
SPECIMEN SOURCE: NORMAL

## 2020-03-30 ENCOUNTER — TELEPHONE (OUTPATIENT)
Dept: MIDWIFE SERVICES | Facility: CLINIC | Age: 30
End: 2020-03-30

## 2020-03-30 DIAGNOSIS — O24.410 DIET CONTROLLED GESTATIONAL DIABETES MELLITUS (GDM) IN THIRD TRIMESTER: Primary | ICD-10-CM

## 2020-03-30 NOTE — TELEPHONE ENCOUNTER
Per M recommendation put order in for growth ultrasound in our clinic. [facility]. OK to cancel appointment on 4/9 and reschedule with growth ultrasound in our clinic sometime the week of 4/13. Please call patient with [facility]  to help get this scheduled. Thanks, Liz Slade CNM

## 2020-04-02 ENCOUNTER — TELEPHONE (OUTPATIENT)
Dept: MIDWIFE SERVICES | Facility: CLINIC | Age: 30
End: 2020-04-02

## 2020-04-02 DIAGNOSIS — K59.00 CONSTIPATION: Primary | ICD-10-CM

## 2020-04-02 RX ORDER — DOCUSATE SODIUM 100 MG/1
100 CAPSULE, LIQUID FILLED ORAL 2 TIMES DAILY PRN
Qty: 30 CAPSULE | Refills: 1 | Status: SHIPPED | OUTPATIENT
Start: 2020-04-02 | End: 2020-07-08

## 2020-04-02 RX ORDER — SENNOSIDES 8.6 MG
1 TABLET ORAL DAILY PRN
Qty: 30 TABLET | Refills: 1 | Status: SHIPPED | OUTPATIENT
Start: 2020-04-02 | End: 2020-07-08

## 2020-04-02 NOTE — TELEPHONE ENCOUNTER
Pt is having acid reflux and was given a prescription at the ER that helps.  She is struggling with constipation/bloating and is wondering if a prescription could be sent to her pharmacy prior to her next visit.  Sent colace/senna.  Nidhi Watson RN

## 2020-04-13 ENCOUNTER — ANCILLARY PROCEDURE (OUTPATIENT)
Dept: ULTRASOUND IMAGING | Facility: CLINIC | Age: 30
End: 2020-04-13
Attending: ADVANCED PRACTICE MIDWIFE
Payer: COMMERCIAL

## 2020-04-13 ENCOUNTER — PRENATAL OFFICE VISIT (OUTPATIENT)
Dept: MIDWIFE SERVICES | Facility: CLINIC | Age: 30
End: 2020-04-13
Attending: ADVANCED PRACTICE MIDWIFE
Payer: COMMERCIAL

## 2020-04-13 VITALS
WEIGHT: 158.4 LBS | SYSTOLIC BLOOD PRESSURE: 113 MMHG | BODY MASS INDEX: 27.04 KG/M2 | TEMPERATURE: 97.4 F | DIASTOLIC BLOOD PRESSURE: 78 MMHG | HEIGHT: 64 IN | HEART RATE: 83 BPM

## 2020-04-13 DIAGNOSIS — O24.410 DIET CONTROLLED GESTATIONAL DIABETES MELLITUS (GDM) IN THIRD TRIMESTER: ICD-10-CM

## 2020-04-13 DIAGNOSIS — O24.410 DIET CONTROLLED GESTATIONAL DIABETES MELLITUS (GDM) IN SECOND TRIMESTER: ICD-10-CM

## 2020-04-13 DIAGNOSIS — Z34.02 ENCOUNTER FOR SUPERVISION OF NORMAL FIRST PREGNANCY IN SECOND TRIMESTER: Primary | ICD-10-CM

## 2020-04-13 PROCEDURE — 76816 OB US FOLLOW-UP PER FETUS: CPT | Performed by: OBSTETRICS & GYNECOLOGY

## 2020-04-13 PROCEDURE — 99207 ZZC PRENATAL VISIT: CPT | Performed by: ADVANCED PRACTICE MIDWIFE

## 2020-04-13 RX ORDER — PRENATAL VIT/IRON FUM/FOLIC AC 27MG-0.8MG
1 TABLET ORAL DAILY
Qty: 90 TABLET | Refills: 3 | Status: SHIPPED | OUTPATIENT
Start: 2020-04-13 | End: 2020-05-11

## 2020-04-13 ASSESSMENT — MIFFLIN-ST. JEOR: SCORE: 1418.5

## 2020-04-13 NOTE — PROGRESS NOTES
32w0d  Heike is here, seen with help of [place]  by phone. Had growth U/S today, WNL, consistent with established dates. Will repeat at 36w. She is feeling good, baby is active, denies bleeding, leaking of fluid. BG log shows BG's WNL. Requesting Care Coordinator referral. She is out of work due to Covid-19, and needs assistance obtaining baby items like car seat. Discussed return visit in 2 weeks.  David Sheehan CNM

## 2020-04-14 ENCOUNTER — PATIENT OUTREACH (OUTPATIENT)
Dept: CARE COORDINATION | Facility: CLINIC | Age: 30
End: 2020-04-14

## 2020-04-14 NOTE — PROGRESS NOTES
The Clinic Community Health Worker spoke with  the patient today to discuss possible Clinic Care Coordination enrollment.  The service was described to the patient and immediate needs were discussed.  The patient agreed to enrollment and an assessment was scheduled.  The patient was provided with contact information for the clinic CHW.               Assessment date: Friday, April 17th at 11 am by phone with CC RN Elsa Randle.    Spoke with pt today with [employer]  on the line. Pt stopped working due to the Coronavirus and is concerned about no income. Would like help getting a car seat and other things for the baby. Pt asked if I could call and remind her about the appointment on Thursday. I agreed.    Routing to CC RN and Midwives as an FYI.

## 2020-04-17 ENCOUNTER — PATIENT OUTREACH (OUTPATIENT)
Dept: NURSING | Facility: CLINIC | Age: 30
End: 2020-04-17
Attending: ADVANCED PRACTICE MIDWIFE
Payer: COMMERCIAL

## 2020-04-17 ASSESSMENT — ACTIVITIES OF DAILY LIVING (ADL): DEPENDENT_IADLS:: INDEPENDENT

## 2020-04-17 NOTE — PROGRESS NOTES
Clinic Care Coordination Contact    Clinic Care Coordination Contact  OUTREACH    Referral Information:  Referral Source: Specialist    Primary Diagnosis: Pregnancy    Chief Complaint   Patient presents with     Clinic Care Coordination - Initial     SW        [place] Utilization: [facility]   Clinic Utilization  Difficulty keeping appointments:: No  Compliance Concerns: No  No-Show Concerns: No  No PCP office visit in Past Year: No  Utilization    Last refreshed: 4/17/2020  2:07 PM:  Hospital Admissions 2           Last refreshed: 4/17/2020  2:07 PM:  ED Visits 1           Last refreshed: 4/17/2020  2:07 PM:  No Show Count (past year) 3              Current as of: 4/17/2020  2:07 PM              Clinical Concerns:  Current Medical Concerns:  Pt is currently 32 weeks pregnant and reports everything to be going well. Pt stated she is in need of resources to help her prepare for the baby.   Current Behavioral Concerns: No behavioral concerns at this time.     Education Provided to patient: SW talked with pt and an interpretor over the phone. SW completed an assessment and created two goals with pt. Pt reported that she was let go from her part time job two weeks ago, but does not understand why. Pt stated that she no longer has an income coming in and is worried about that. SW suggested calling the employer and explain what happened. SW will help pt navigate unemployment insurance if she qualifies. SW put in an order for a car seat from [employer].   Pain  Pain (GOAL):: No  Health Maintenance Reviewed: Due/Overdue   Health Maintenance Due   Topic Date Due     PREVENTIVE CARE VISIT  1989     DTAP/TDAP/TD IMMUNIZATION (1 - Tdap) 01/01/2000     MATERNAL SCREENING  12/16/2019     PHQ-2  01/01/2020     REPEAT ANTIBODY SCREEN (OB)  03/16/2020       Clinical Pathway: None    Medication Management:       Functional Status:  Dependent ADLs:: Independent  Dependent IADLs:: Independent  Bed  or wheelchair confined:: No  Mobility Status: Independent  Fallen 2 or more times in the past year?: No  Any fall with injury in the past year?: No    Living Situation:  Current living arrangement:: I live in a private home with family  Type of residence:: Apartment    Lifestyle & Psychosocial Needs:  Lifestyle     Physical activity     Days per week: Not on file     Minutes per session: Not on file     Stress: Not at all        Diet:: Regular  Inadequate nutrition (GOAL):: No  Tube Feeding: No  Inadequate activity/exercise (GOAL):: No  Significant changes in sleep pattern (GOAL): No  Transportation means:: Regular car     Jainism or spiritual beliefs that impact treatment:: No  Mental health DX:: No  Mental health management concern (GOAL):: No  Informal Support system:: Family   Socioeconomic History     Marital status: Single     Spouse name: Not on file     Number of children: Not on file     Years of education: Not on file     Highest education level: Not on file   Relationships     Social connections     Talks on phone: Not on file     Gets together: Not on file     Attends Baptism service: Not on file     Active member of club or organization: Not on file     Attends meetings of clubs or organizations: Not on file     Relationship status:      Intimate partner violence     Fear of current or ex partner: No     Emotionally abused: No     Physically abused: No     Forced sexual activity: No     Tobacco Use     Smoking status: Never Smoker     Smokeless tobacco: Never Used   Substance and Sexual Activity     Alcohol use: No     Drug use: No     Sexual activity: Yes     Partners: Male            Resources and Interventions:  Current Resources:      Community Resources: WIC, County Programs  Supplies used at home:: None  Equipment Currently Used at Home: none    Advance Care Plan/Directive  Advanced Care Plans/Directives on file:: No  Advanced Care Plan/Directive Status: Considering Options    Referrals  Placed: Community Resources, County Resources     Goals:   Goals        General    Financial Wellbeing (pt-stated)     Notes - Note created  2020  2:06 PM by Licha Easley BSW    Goal Statement: I would like to find financial help and resources in the next month.  Date Goal set: 2020  Barriers: unsure why she was laid off  Strengths: asking company why she was let go  Date to Achieve By: 2020  Patient expressed understanding of goal: yes   Action steps to achieve this goal:  1. I will contact my company and understand why I was let go of my job two weeks ago.   2. I will contact SW when I understand the reason.   3. I will have  help me to apply for unemployment if I was laid off.         Functional (pt-stated)     Notes - Note created  2020  2:09 PM by Licha Easley BSW    Goal Statement: I would like to figure out how to get a low cost or free car seat before my due date on 2020.  Date Goal set: 2020  Barriers: unsure where to find one   Strengths: Making sure her  is safe  Date to Achieve By: 2020  Patient expressed understanding of goal: yes  Action steps to achieve this goal:  1. I will have SW help me apply for a car seat from [employer].   2. I will notify CHW/SW when I receive a call about the car seat.                 Patient/Caregiver understanding: yes    Outreach Frequency: monthly  Future Appointments              In 1 week Wendy Lowery APRN CNM [facility]    In 3 weeks RDUS1 [facility]    In 3 weeks Wendy Lowery APRN CNM [facility]          Plan: 1) Pt will call employer to understand ending of job.   2) Pt will call SW/CHW to see if she qualifies for unemployment insurance.   3) Pt will wait for a call from [employer].   4) CHW will follow up with pt in 1 month.   5) SW put in a car seat referral for pt.   6) SW will follow up in 45 days.     Licha Easley  [facility]  Clinic Care Coordinator   [facility] [place]   [phone]

## 2020-04-27 ENCOUNTER — PRENATAL OFFICE VISIT (OUTPATIENT)
Dept: MIDWIFE SERVICES | Facility: CLINIC | Age: 30
End: 2020-04-27
Payer: COMMERCIAL

## 2020-04-27 ENCOUNTER — TELEPHONE (OUTPATIENT)
Dept: MIDWIFE SERVICES | Facility: CLINIC | Age: 30
End: 2020-04-27

## 2020-04-27 VITALS
BODY MASS INDEX: 26.98 KG/M2 | HEIGHT: 64 IN | WEIGHT: 158 LBS | TEMPERATURE: 97.5 F | SYSTOLIC BLOOD PRESSURE: 102 MMHG | DIASTOLIC BLOOD PRESSURE: 68 MMHG | HEART RATE: 76 BPM

## 2020-04-27 DIAGNOSIS — Z34.03 ENCOUNTER FOR SUPERVISION OF NORMAL FIRST PREGNANCY IN THIRD TRIMESTER: Primary | ICD-10-CM

## 2020-04-27 PROCEDURE — 99207 ZZC PRENATAL VISIT: CPT | Performed by: ADVANCED PRACTICE MIDWIFE

## 2020-04-27 ASSESSMENT — MIFFLIN-ST. JEOR: SCORE: 1416.68

## 2020-04-27 NOTE — TELEPHONE ENCOUNTER
Hello,   This patient was seen by your team for gestational diabetes but has not been in touch for awhile. She said she has been trying to email you but has not had any response so stopped. Could you please contact her and give her the correct information to get in touch with you.   She has her numbers and they look good so far but she is struggling with Ramadan now. I advised her to not fast during Ramadan but I know that is hard so maybe we can find some middle ground. I told her you would reach out to her so she is expecting a call. She needs an , Sb.   Let me know if you need anything else from us.   Thanks! Dot

## 2020-04-27 NOTE — PROGRESS NOTES
34w0d  Patient feeling well. Positive fetal movement. Denies water leaking, vaginal bleeding, decreased fetal movement, contraction pain, or headaches.   Doing well. No concerns. Discussed her sugars. All within normal limit but starting to reach more 95 on fasting and 140 PP. Having a harder time with Ramadan. Discussed recommended not to fast. She reports that will be hard. She has not been in contact with [facility]. Has been emailing them but not getting a response so stopped. Discussed most likely the wrong email and they will contact her. Message sent to [facility] to contact patient. She otherwise is doing well.    was present over the phone during interview to insure patient understanding of the instructions and counseling.  Danger signs reviewed, pre-eclampsia signs and symptoms discussed.   Knows when to call triage and has phone numbers.   Follow up in 2 weeks as planned for growth and visit after for GBS and HGB. Offer Tdap vaccination, declined today but may accept next visit.   SUSAN Kahn CNM

## 2020-04-28 ENCOUNTER — APPOINTMENT (OUTPATIENT)
Dept: INTERPRETER SERVICES | Facility: CLINIC | Age: 30
End: 2020-04-28
Payer: COMMERCIAL

## 2020-04-28 NOTE — TELEPHONE ENCOUNTER
Call to patient through [employer]  service- [facility].    Patient states she has been emailing her blood sugars but never heard back so stopped sending them. She mentioned she was sending to an address that had a picture of the person she saw.  Unsure who that is so verified her email address and sent her an email that she can reply to also gave her the [email or web address] email address to send her messages to.     Patient states her blood sugars have been in goal and she is fasting during Ramadan right now. She is eating 3 meals a day and testing BG's 4 times a day:     Meals are typically 8:20pm, 11:30pm and 3:30am.  She is testing before the 8:20 meal and 1 hour after each of the other meals.  She does not have BG's available for me at this time.      Recommend she do a telephone follow up with NAVNEET HUBBARD to review BG's and food next week, she is in agreement. Appointment scheduled on Monday at 1:30pm.     Radha López RN,CDE

## 2020-05-04 ENCOUNTER — APPOINTMENT (OUTPATIENT)
Dept: INTERPRETER SERVICES | Facility: CLINIC | Age: 30
End: 2020-05-04
Payer: COMMERCIAL

## 2020-05-04 ENCOUNTER — ALLIED HEALTH/NURSE VISIT (OUTPATIENT)
Dept: EDUCATION SERVICES | Facility: CLINIC | Age: 30
End: 2020-05-04
Payer: COMMERCIAL

## 2020-05-04 DIAGNOSIS — O24.410 DIET CONTROLLED GESTATIONAL DIABETES MELLITUS (GDM) IN THIRD TRIMESTER: Primary | ICD-10-CM

## 2020-05-04 PROCEDURE — 98967 PH1 ASSMT&MGMT NQHP 11-20: CPT

## 2020-05-04 NOTE — PROGRESS NOTES
Patient verbally consented to the telephone visit service today: yes      Gestational Diabetes Follow-up    Subjective/Objective:    Heike Musa sent in blood glucose log for review. Last date of communication was: 4/27/2020.    Gestational diabetes is being managed with diet and activity    Taking diabetes medications: no    Estimated Date of Delivery: Jun 8, 2020    BG/Food Log:   Blood Glucose/Ketone Log:    Date Ketones Fasting Post Breakfast Post Lunch Post Supper   4/28 neg 90 133 (1.5 hours later) 138 140   4/29 neg 91 132 136 139   4/30 neg 95 135 139 140   5/1 neg 93 129 138 125   5/2 neg 94 129 130 138   5/3 neg 90 122 128 139         Assessment:    Ketones: neg.   Fasting blood glucoses: 100% in target.  After breakfast: 100% in target.  After lunch: 100% in target.  After dinner: 100% in target.    Plan/Response:  No changes in the patient's current treatment plan.  Follow-up in 1 week.    STEVIE Hdz CDE  Time Spent:11:03 minutes    Any diabetes medication dose changes were made via the CDE Protocol and Collaborative Practice Agreement with the patient's OB/GYN provider. A copy of this encounter was shared with the provider.

## 2020-05-11 ENCOUNTER — ANCILLARY PROCEDURE (OUTPATIENT)
Dept: ULTRASOUND IMAGING | Facility: CLINIC | Age: 30
End: 2020-05-11
Attending: ADVANCED PRACTICE MIDWIFE
Payer: COMMERCIAL

## 2020-05-11 ENCOUNTER — APPOINTMENT (OUTPATIENT)
Dept: INTERPRETER SERVICES | Facility: CLINIC | Age: 30
End: 2020-05-11
Payer: COMMERCIAL

## 2020-05-11 ENCOUNTER — PRENATAL OFFICE VISIT (OUTPATIENT)
Dept: MIDWIFE SERVICES | Facility: CLINIC | Age: 30
End: 2020-05-11
Attending: ADVANCED PRACTICE MIDWIFE
Payer: COMMERCIAL

## 2020-05-11 VITALS
TEMPERATURE: 97.8 F | WEIGHT: 160 LBS | HEART RATE: 75 BPM | SYSTOLIC BLOOD PRESSURE: 110 MMHG | BODY MASS INDEX: 27.46 KG/M2 | DIASTOLIC BLOOD PRESSURE: 75 MMHG

## 2020-05-11 DIAGNOSIS — O24.410 DIET CONTROLLED GESTATIONAL DIABETES MELLITUS (GDM) IN SECOND TRIMESTER: ICD-10-CM

## 2020-05-11 DIAGNOSIS — Z34.02 ENCOUNTER FOR SUPERVISION OF NORMAL FIRST PREGNANCY IN SECOND TRIMESTER: Primary | ICD-10-CM

## 2020-05-11 DIAGNOSIS — Z34.02 ENCOUNTER FOR SUPERVISION OF NORMAL FIRST PREGNANCY IN SECOND TRIMESTER: ICD-10-CM

## 2020-05-11 LAB — HGB BLD-MCNC: 12.5 G/DL (ref 11.7–15.7)

## 2020-05-11 PROCEDURE — 76816 OB US FOLLOW-UP PER FETUS: CPT | Performed by: OBSTETRICS & GYNECOLOGY

## 2020-05-11 PROCEDURE — 00000218 ZZHCL STATISTIC OBHBG - HEMOGLOBIN: Performed by: ADVANCED PRACTICE MIDWIFE

## 2020-05-11 PROCEDURE — 87653 STREP B DNA AMP PROBE: CPT | Performed by: ADVANCED PRACTICE MIDWIFE

## 2020-05-11 PROCEDURE — 99207 ZZC PRENATAL VISIT: CPT | Performed by: ADVANCED PRACTICE MIDWIFE

## 2020-05-11 PROCEDURE — 36415 COLL VENOUS BLD VENIPUNCTURE: CPT | Performed by: ADVANCED PRACTICE MIDWIFE

## 2020-05-11 RX ORDER — PRENATAL VIT/IRON FUM/FOLIC AC 27MG-0.8MG
1 TABLET ORAL DAILY
Qty: 90 TABLET | Refills: 3 | Status: ON HOLD | OUTPATIENT
Start: 2020-05-11 | End: 2020-06-09

## 2020-05-11 NOTE — PROGRESS NOTES
Pt here in clinic,  on phone. Feeling well, no concerns. Baby is active. No regular contractions, LOF or bleeding. Accepts GBS, hgb today. GBS collected by CNM and Type III infibulation noted. Pt states she has not discussed this yet with provider. Discussed commonly need to perform anterior episiotomy and explained what the procedure would be. Asked if patient would prefer repair to as it is now or left open. Pt asked a few questions and said she would like to discuss further at next visit. Also, patient asking about a car seat that she applied for through [employer] with the assistance of Licha Easley. Sent message to Licha to follow up. GDM book reviewed - all values WNL since 5/4 which was the last time she sent them in to [facility]. Plans to send again. Given another booklet as she was on last page. RTC weekly. MML

## 2020-05-12 ENCOUNTER — ALLIED HEALTH/NURSE VISIT (OUTPATIENT)
Dept: EDUCATION SERVICES | Facility: CLINIC | Age: 30
End: 2020-05-12
Payer: COMMERCIAL

## 2020-05-12 DIAGNOSIS — O24.410 DIET CONTROLLED GESTATIONAL DIABETES MELLITUS (GDM) IN THIRD TRIMESTER: Primary | ICD-10-CM

## 2020-05-12 LAB
GP B STREP DNA SPEC QL NAA+PROBE: NEGATIVE
SPECIMEN SOURCE: NORMAL

## 2020-05-12 PROCEDURE — 98966 PH1 ASSMT&MGMT NQHP 5-10: CPT

## 2020-05-12 NOTE — PROGRESS NOTES
Patient verbally consented to the telephone visit service today: yes      Gestational Diabetes Follow-up    Subjective/Objective:    Heike Musa was called for BG review. Last date of communication was: 5/4/2020.    Gestational diabetes is being managed with diet and activity    Taking diabetes medications: no    Estimated Date of Delivery: Jun 8, 2020    BG/Food Log:   Blood Glucose/Ketone Log:    Date Fasting Post Breakfast Post Lunch Post Supper   5/6 90 130 133 138   5/7 91 128 130 136   5/8 90 135 139 140   5/9 93 134 137 139   5/10 91 137 129 138   5/11 94 135 133 137   5/12 90 133 137          Assessment:    Ketones: na.   Fasting blood glucoses: 100% in target.  After breakfast: 100% in target.  After lunch: 100% in target.  After dinner: 100% in target.    Plan/Response:  No changes in the patient's current treatment plan.  Follow-up in 1 week.    STEVIE Hdz CDE  Time Spent: 7:53 minutes    Any diabetes medication dose changes were made via the CDE Protocol and Collaborative Practice Agreement with the patient's OB/GYN provider. A copy of this encounter was shared with the provider.

## 2020-05-15 ENCOUNTER — TELEPHONE (OUTPATIENT)
Dept: MIDWIFE SERVICES | Facility: CLINIC | Age: 30
End: 2020-05-15

## 2020-05-15 DIAGNOSIS — Z34.02 ENCOUNTER FOR SUPERVISION OF NORMAL FIRST PREGNANCY IN SECOND TRIMESTER: Primary | ICD-10-CM

## 2020-05-15 NOTE — TELEPHONE ENCOUNTER
Patient needing car seat for infant once delivered. Social work consult entered to help patient with this. Estrella Osei RN

## 2020-05-18 ENCOUNTER — PRENATAL OFFICE VISIT (OUTPATIENT)
Dept: MIDWIFE SERVICES | Facility: CLINIC | Age: 30
End: 2020-05-18
Payer: COMMERCIAL

## 2020-05-18 VITALS
SYSTOLIC BLOOD PRESSURE: 111 MMHG | HEART RATE: 74 BPM | WEIGHT: 159.3 LBS | DIASTOLIC BLOOD PRESSURE: 78 MMHG | TEMPERATURE: 97.8 F | BODY MASS INDEX: 27.34 KG/M2

## 2020-05-18 DIAGNOSIS — Z34.03 ENCOUNTER FOR SUPERVISION OF NORMAL FIRST PREGNANCY IN THIRD TRIMESTER: Primary | ICD-10-CM

## 2020-05-18 PROCEDURE — 99207 ZZC PRENATAL VISIT: CPT | Performed by: ADVANCED PRACTICE MIDWIFE

## 2020-05-18 NOTE — PROGRESS NOTES
Pt in clinic with  on phone. Feeling well, no concerns. Baby is active. No regular contractions, LOF or bleeding. Reviewed labs from last week. Discussed recommendations for IOL by PAL r/t GDM diet controlled. Pt asked many questions to clarify - explained that it is a recommendation to have the baby by the due date but that ultimately she has to consent to IOL. Pt will consider this and we can discuss further next week. Reports all blood sugars WNL, does not have booklet today. Has appt with DE tomorrow to review blood sugar log. Did not have the opportunity to E[place] back to discussion about female circ and delivery. Will check in at next visit to see if patient has any other questions. Got a message from clinic , plans to call patient tomorrow to check on car seat status. Relayed this message to patient. Also need to review COVID restrictions at next visit. Did review where to go today. MML

## 2020-05-19 ENCOUNTER — PATIENT OUTREACH (OUTPATIENT)
Dept: CARE COORDINATION | Facility: CLINIC | Age: 30
End: 2020-05-19

## 2020-05-19 ENCOUNTER — ALLIED HEALTH/NURSE VISIT (OUTPATIENT)
Dept: EDUCATION SERVICES | Facility: CLINIC | Age: 30
End: 2020-05-19
Payer: COMMERCIAL

## 2020-05-19 DIAGNOSIS — O24.410 DIET CONTROLLED GESTATIONAL DIABETES MELLITUS (GDM) IN THIRD TRIMESTER: Primary | ICD-10-CM

## 2020-05-19 PROCEDURE — 98966 PH1 ASSMT&MGMT NQHP 5-10: CPT

## 2020-05-19 NOTE — PROGRESS NOTES
Clinic Care Coordination Contact  Community Health Worker Follow Up    Goals:   Goals Addressed as of 2020 at 4:38 PM        Patient Stated      Financial Wellbeing (pt-stated)     Added 20 by Licha Easley BSW     Goal Statement: I would like to find financial help and resources in the next month.  Date Goal set: 2020  Barriers: unsure why she was laid off  Strengths: asking company why she was let go  Date to Achieve By: 2020  Patient expressed understanding of goal: yes     Action steps to achieve this goal:  1. I will contact my former employer and ask for the reason I was let go by Friday, May 22nd.  2. I will call the CC SW and let her know why I was let go.  3. I will ask the CC SW to help me apply for unemployment insurance (if it appears I might qualify) online.     Updated: 20            Functional (pt-stated)     Added 20 by Licha Easley BSW     Goal Statement: I would like to figure out how to get a low cost or free car seat before my due date on 2020.  Date Goal set: 2020  Barriers: unsure where to find one   Strengths: Making sure her  is safe  Date to Achieve By: 2020  Patient expressed understanding of goal: yes    Action steps to achieve this goal:  1. I will ask the CC SW to submit a referral to [employer] for a car seat (completed)  2. I will ask the CHW to follow up with the SW to see where the referral is at and when I will get the car seat.  3. I will let the CHW/SW know when I hear back from [employer].    Updated: 20                CHW Next Follow-up: one week    CHW Plan: CHW will ask SW to follow up with pt tomorrow regarding car seat referral. Pt will contact former employer by  to ask why she was let go. Pt will contact SW when she has an update and to see if she might be eligible for unemployment insurance.    Called pt today with [place]  on the line. Pt said she is doing well and that the baby is  moving a lot. Pt's due date is June 8th but she said that due to her gestational diabetes, her provider thinks she may be induced before then--possibly within the next two weeks. Pt said she has a lot of anxiety right now about not having all of the things she needs for the baby, especially the car seat. I asked pt if she has heard back from [employer], and she said no. I offered to have [facility] follow up with pt to see where referral is at and to figure out the details (will pt , when will it be available, etc.). Pt agreed. Pt said if the seat isn't available in the next week or so, she will go shopping and get one with her family.     I asked if we can help pt with anything else for baby. Pt asked for examples of what we can help with. I mentioned diapers, and pt said she is interested. I offered to give pt [employer] information but she declined. Pt would like to talk with [facility] about it. Pt also asked about a breast pump. I suggested that pt call her insurance ([facility], [phone]) to ask about coverage. I also suggested that pt speak with her midwife at next appointment and ask to have an order written for one. I let pt know that most insurance companies cover the cost of breast pumps for new moms. Pt said she will speak with her provider at her upcoming appointment about it.    When I asked if pt had contacted her former employer, she said no. Pt said she has been busy getting ready for baby but will do it by the end of the week. I offered our support to help pt apply for unemployment insurance, and pt said she is interested. Asked pt to follow up and let us know when she connects with her former employer.    Asked if pt needs anything else at this time, and she declined. Pt thanked me and said she will wait to hear from [facility].    Routing to [facility] and Midwife as DARON. Scheduled follow up call to pt from [facility] tomorrow (5/20) at 1 pm.

## 2020-05-19 NOTE — PROGRESS NOTES
Patient verbally consented to the telephone visit service today: yes    Gestational Diabetes Follow-up    Subjective/Objective:    Heike Musa sent in blood glucose log for review. Last date of communication was: 5/12/2020.    Gestational diabetes is being managed with diet and activity    Taking diabetes medications: no    Estimated Date of Delivery: Jun 8, 2020    Blood Glucose/Ketone Log:    Date Fasting Post Breakfast Post Lunch Post Supper   5/13 91 129 133 137   5/14 90 140 139 138   5/15 95 135 140 129   5/16 92 137 136 130   5/17 90 133 130 139   5/18 91 128 125 133   5/19 90 133 139          Assessment:    Fasting blood glucoses: 100% in target.  After breakfast: 100% in target.  After lunch: 100% in target.  After dinner: 100% in target.    Plan/Response:  No changes in the patient's current treatment plan.  Follow up Wednesday next week    STEVIE Hdz CDE  Time Spent: 8:54 minutes    Any diabetes medication dose changes were made via the CDE Protocol and Collaborative Practice Agreement with the patient's OB/GYN provider. A copy of this encounter was shared with the provider.

## 2020-05-26 ENCOUNTER — PRENATAL OFFICE VISIT (OUTPATIENT)
Dept: MIDWIFE SERVICES | Facility: CLINIC | Age: 30
End: 2020-05-26
Payer: COMMERCIAL

## 2020-05-26 VITALS
HEART RATE: 75 BPM | DIASTOLIC BLOOD PRESSURE: 80 MMHG | WEIGHT: 166 LBS | SYSTOLIC BLOOD PRESSURE: 120 MMHG | BODY MASS INDEX: 28.49 KG/M2 | TEMPERATURE: 97.5 F

## 2020-05-26 DIAGNOSIS — Z23 NEED FOR TDAP VACCINATION: ICD-10-CM

## 2020-05-26 DIAGNOSIS — Z34.02 ENCOUNTER FOR SUPERVISION OF NORMAL FIRST PREGNANCY IN SECOND TRIMESTER: ICD-10-CM

## 2020-05-26 DIAGNOSIS — O24.410 DIET CONTROLLED GESTATIONAL DIABETES MELLITUS (GDM) IN SECOND TRIMESTER: ICD-10-CM

## 2020-05-26 PROBLEM — Z36.89 ENCOUNTER FOR TRIAGE IN PREGNANT PATIENT: Status: RESOLVED | Noted: 2020-03-25 | Resolved: 2020-05-26

## 2020-05-26 PROCEDURE — 99207 ZZC PRENATAL VISIT: CPT | Performed by: ADVANCED PRACTICE MIDWIFE

## 2020-05-26 NOTE — PROGRESS NOTES
38w1d  Sb  on the phone, patient marked upset and angry as communication is difficult.  Discussed induction at 40 weeks due to GDM, pt was confused and tried to explain reasons why.  Patient feels frustrated in who will be at delivery and who can stay with her she is hoping it is her mother.   Pt frustrated that her mother may need a note for work as her support person will need to stay in hospital with her.   Discussed induction.  Made appt next week for 30 minutes to plan to schedule for 40 weeks, pt PAL 20.    Fasting B  14 are < 96; abnormal values range from (96)  1 Hour PP B / 41 are < 141; no abnormal values    Patient will come next week to discuss induction at 40 weeks.    Wendy Lowery CNM

## 2020-05-26 NOTE — Clinical Note
I had a very frustrating prenatal visit with her,  cutting in and out, patient was increasingly frustrated about discussion of induction.  I gave you 30 minutes to discuss scheduling induction at 40 weeks and explaining what that means.   I think she was frustrated with  and me I just stopped.    She sees you on the 6/2/20.  If she is scheduled for induction will need covid which I did not talk with her about today.     Thanks,  Wendy Lowery CNM

## 2020-05-29 ENCOUNTER — TELEPHONE (OUTPATIENT)
Dept: EDUCATION SERVICES | Facility: CLINIC | Age: 30
End: 2020-05-29

## 2020-05-29 NOTE — TELEPHONE ENCOUNTER
"Blood sugars were reviewed with OB provider on  appt.   See below:    \"Fasting B / 14 are < 96; abnormal values range from (96)  1 Hour PP B / 41 are < 141; no abnormal values\"    No changes needed and patient will be following-up with her OB provider again next week.    Janelle Gomez RN, CDE      "

## 2020-06-02 ENCOUNTER — PRENATAL OFFICE VISIT (OUTPATIENT)
Dept: MIDWIFE SERVICES | Facility: CLINIC | Age: 30
End: 2020-06-02
Payer: COMMERCIAL

## 2020-06-02 VITALS
DIASTOLIC BLOOD PRESSURE: 70 MMHG | SYSTOLIC BLOOD PRESSURE: 123 MMHG | BODY MASS INDEX: 28.32 KG/M2 | HEART RATE: 72 BPM | TEMPERATURE: 98.4 F | WEIGHT: 165 LBS

## 2020-06-02 DIAGNOSIS — Z34.03 ENCOUNTER FOR SUPERVISION OF NORMAL FIRST PREGNANCY, THIRD TRIMESTER: Primary | ICD-10-CM

## 2020-06-02 DIAGNOSIS — Z34.02 ENCOUNTER FOR SUPERVISION OF NORMAL FIRST PREGNANCY IN SECOND TRIMESTER: ICD-10-CM

## 2020-06-02 DIAGNOSIS — O24.410 DIET CONTROLLED GESTATIONAL DIABETES MELLITUS (GDM) IN SECOND TRIMESTER: ICD-10-CM

## 2020-06-02 PROCEDURE — 99207 ZZC PRENATAL VISIT: CPT | Performed by: ADVANCED PRACTICE MIDWIFE

## 2020-06-02 NOTE — PROGRESS NOTES
39w1d  Patient would like appointment next week and consider induction after that. BG numbers have been are within normal limits. Baby is measuring well. Reports good fetal movement. Denies leaking of fluid, vaginal bleeding, regular uterine contractions, headache or other concerns.  RTC in 1 wk DESMOND

## 2020-06-03 ENCOUNTER — NURSE TRIAGE (OUTPATIENT)
Dept: NURSING | Facility: CLINIC | Age: 30
End: 2020-06-03

## 2020-06-04 NOTE — TELEPHONE ENCOUNTER
39w2d  T.C.  With Faroese , since yesterday feeling dizzy, nausea, took pain medicine taking tylanol, tylanol,    Enc. Small frequent meals, increasing water as it hot outside  Patient concerned about blood sugars but all blood sugars are WNL.   Reassured about blood sugars as they are all normal.   State sshe feels like she is drinking enough water.  Feeling dryness of mouth and nausea  Suggested mint, lemon tea or hard candy  Call clinic if not feeling better by later morning and pt should be seen for evaluation.   Discussed labor signs and enc. To call if getting contractions.  Faroese   present throughout all of phone conversation.  Wendy Lowery CNM

## 2020-06-04 NOTE — TELEPHONE ENCOUNTER
Patient calling reporting she is feeling lightheaded. States her blood sugar earlier today was 71. Current blood sugar is 126. Patient states she gets very thirsty and has dry mouth. Denies abdominal pain. Denies contractions.     Per guideline, informed patient RN will page on call provider for second level triage to call patient directly.     Paged on-call provider CARLITOS WAY CNM, for [facility], via [email or web address] at 11:40pm to call patient directly at [phone]    Balta James RN  [facility]         Reason for Disposition    [1] Drinking very little AND [2] dehydration suspected (e.g., no urine > 12 hours, very dry mouth, very lightheaded)    Additional Information    Negative: Severe vaginal bleeding (e.g., continuous red blood from vagina, or large blood clots)    Negative: Shock suspected (very weak, limp, too weak to stand, pale cool skin)    Negative: Severe difficulty breathing (e.g., struggling for each breath, speaks in single words)    Negative: Umbilical cord hanging out of the vagina (shiny, white, curled appearance)    Negative: Uncontrollable urge to push (i.e., feels like baby is coming out now) or can see baby    Negative: Sounds like a life-threatening emergency to the triager    Negative: [1] Leakage of clear fluid from vagina AND [2] less than 37 weeks    Negative: Active labor suspected (e.g., painful, frequent contractions)    Negative: [1] Abdominal pain AND [2] MODERATE or SEVERE (pain interferes with normal activities or awakens from sleep)    Negative: [1] Vaginal bleeding AND [2] has placenta previa    Negative: Vaginal bleeding (Exception: Single episode of mild spotting after wiping, intercourse or pelvic exam)    Negative: [1] Baby moving less today AND [2] more than 28 weeks pregnant    Negative: Chest pain    Negative: [1] Difficulty breathing or shortness of breath AND [2] new onset or worsening    Negative: Triager suspects serious  pregnancy-related symptom    Negative: Patient sounds very sick or weak to the triager    Negative: [1] SEVERE vomiting (e.g., 8 or more times / day) AND [2] present > 8 hours    Protocols used: PREGNANCY [id number]

## 2020-06-07 ENCOUNTER — NURSE TRIAGE (OUTPATIENT)
Dept: NURSING | Facility: CLINIC | Age: 30
End: 2020-06-07

## 2020-06-07 ENCOUNTER — HOSPITAL ENCOUNTER (OUTPATIENT)
Facility: CLINIC | Age: 30
Discharge: HOME | End: 2020-06-07
Attending: ADVANCED PRACTICE MIDWIFE | Admitting: ADVANCED PRACTICE MIDWIFE
Payer: COMMERCIAL

## 2020-06-07 ENCOUNTER — HOSPITAL ENCOUNTER (INPATIENT)
Facility: CLINIC | Age: 30
LOS: 3 days | Discharge: HOME | End: 2020-06-10
Attending: ADVANCED PRACTICE MIDWIFE | Admitting: ADVANCED PRACTICE MIDWIFE
Payer: COMMERCIAL

## 2020-06-07 ENCOUNTER — TELEPHONE (OUTPATIENT)
Dept: MIDWIFE SERVICES | Facility: CLINIC | Age: 30
End: 2020-06-07

## 2020-06-07 VITALS
WEIGHT: 165 LBS | BODY MASS INDEX: 26.52 KG/M2 | RESPIRATION RATE: 16 BRPM | SYSTOLIC BLOOD PRESSURE: 131 MMHG | HEIGHT: 66 IN | DIASTOLIC BLOOD PRESSURE: 81 MMHG | TEMPERATURE: 98.3 F

## 2020-06-07 DIAGNOSIS — Z34.02 ENCOUNTER FOR SUPERVISION OF NORMAL FIRST PREGNANCY IN SECOND TRIMESTER: ICD-10-CM

## 2020-06-07 DIAGNOSIS — R14.1 FLATULENCE, ERUCTATION AND GAS PAIN: Primary | ICD-10-CM

## 2020-06-07 DIAGNOSIS — K63.9 BOWEL TROUBLE: ICD-10-CM

## 2020-06-07 DIAGNOSIS — R14.3 FLATULENCE, ERUCTATION AND GAS PAIN: Primary | ICD-10-CM

## 2020-06-07 DIAGNOSIS — R14.2 FLATULENCE, ERUCTATION AND GAS PAIN: Primary | ICD-10-CM

## 2020-06-07 PROBLEM — Z36.89 ENCOUNTER FOR TRIAGE IN PREGNANT PATIENT: Status: ACTIVE | Noted: 2020-06-07

## 2020-06-07 LAB
GLUCOSE BLDC GLUCOMTR-MCNC: 73 MG/DL (ref 70–99)
RUPTURE OF FETAL MEMBRANES BY ROM PLUS: POSITIVE
SARS-COV-2 PCR COMMENT: NORMAL
SARS-COV-2 RNA SPEC QL NAA+PROBE: NEGATIVE
SARS-COV-2 RNA SPEC QL NAA+PROBE: NORMAL
SPECIMEN SOURCE: NORMAL
SPECIMEN SOURCE: NORMAL

## 2020-06-07 PROCEDURE — 86850 RBC ANTIBODY SCREEN: CPT | Performed by: ADVANCED PRACTICE MIDWIFE

## 2020-06-07 PROCEDURE — C9803 HOPD COVID-19 SPEC COLLECT: HCPCS

## 2020-06-07 PROCEDURE — 59025 FETAL NON-STRESS TEST: CPT | Mod: 26 | Performed by: ADVANCED PRACTICE MIDWIFE

## 2020-06-07 PROCEDURE — 85025 COMPLETE CBC W/AUTO DIFF WBC: CPT | Performed by: ADVANCED PRACTICE MIDWIFE

## 2020-06-07 PROCEDURE — 12000001 ZZH R&B MED SURG/OB UMMC

## 2020-06-07 PROCEDURE — 82962 GLUCOSE BLOOD TEST: CPT

## 2020-06-07 PROCEDURE — 86900 BLOOD TYPING SEROLOGIC ABO: CPT | Performed by: ADVANCED PRACTICE MIDWIFE

## 2020-06-07 PROCEDURE — 86780 TREPONEMA PALLIDUM: CPT | Performed by: ADVANCED PRACTICE MIDWIFE

## 2020-06-07 PROCEDURE — 84112 EVAL AMNIOTIC FLUID PROTEIN: CPT | Performed by: ADVANCED PRACTICE MIDWIFE

## 2020-06-07 PROCEDURE — 25000125 ZZHC RX 250

## 2020-06-07 PROCEDURE — 59025 FETAL NON-STRESS TEST: CPT

## 2020-06-07 PROCEDURE — G0463 HOSPITAL OUTPT CLINIC VISIT: HCPCS | Mod: 25

## 2020-06-07 PROCEDURE — U0003 INFECTIOUS AGENT DETECTION BY NUCLEIC ACID (DNA OR RNA); SEVERE ACUTE RESPIRATORY SYNDROME CORONAVIRUS 2 (SARS-COV-2) (CORONAVIRUS DISEASE [COVID-19]), AMPLIFIED PROBE TECHNIQUE, MAKING USE OF HIGH THROUGHPUT TECHNOLOGIES AS DESCRIBED BY CMS-2020-01-R: HCPCS | Performed by: ADVANCED PRACTICE MIDWIFE

## 2020-06-07 PROCEDURE — 86901 BLOOD TYPING SEROLOGIC RH(D): CPT | Performed by: ADVANCED PRACTICE MIDWIFE

## 2020-06-07 PROCEDURE — 99213 OFFICE O/P EST LOW 20 MIN: CPT | Mod: 25 | Performed by: ADVANCED PRACTICE MIDWIFE

## 2020-06-07 RX ORDER — NALOXONE HYDROCHLORIDE 0.4 MG/ML
.1-.4 INJECTION, SOLUTION INTRAMUSCULAR; INTRAVENOUS; SUBCUTANEOUS
Status: DISCONTINUED | OUTPATIENT
Start: 2020-06-07 | End: 2020-06-08

## 2020-06-07 RX ORDER — LIDOCAINE 40 MG/G
CREAM TOPICAL
Status: DISCONTINUED | OUTPATIENT
Start: 2020-06-07 | End: 2020-06-08

## 2020-06-07 RX ORDER — CARBOPROST TROMETHAMINE 250 UG/ML
250 INJECTION, SOLUTION INTRAMUSCULAR
Status: DISCONTINUED | OUTPATIENT
Start: 2020-06-07 | End: 2020-06-08

## 2020-06-07 RX ORDER — SODIUM CHLORIDE, SODIUM LACTATE, POTASSIUM CHLORIDE, CALCIUM CHLORIDE 600; 310; 30; 20 MG/100ML; MG/100ML; MG/100ML; MG/100ML
INJECTION, SOLUTION INTRAVENOUS CONTINUOUS
Status: DISCONTINUED | OUTPATIENT
Start: 2020-06-07 | End: 2020-06-08

## 2020-06-07 RX ORDER — ONDANSETRON 2 MG/ML
4 INJECTION INTRAMUSCULAR; INTRAVENOUS EVERY 6 HOURS PRN
Status: DISCONTINUED | OUTPATIENT
Start: 2020-06-07 | End: 2020-06-08

## 2020-06-07 RX ORDER — FENTANYL CITRATE 50 UG/ML
50-100 INJECTION, SOLUTION INTRAMUSCULAR; INTRAVENOUS
Status: DISCONTINUED | OUTPATIENT
Start: 2020-06-07 | End: 2020-06-08

## 2020-06-07 RX ORDER — METHYLERGONOVINE MALEATE 0.2 MG/ML
200 INJECTION INTRAVENOUS
Status: DISCONTINUED | OUTPATIENT
Start: 2020-06-07 | End: 2020-06-08

## 2020-06-07 RX ORDER — OXYTOCIN/0.9 % SODIUM CHLORIDE 30/500 ML
100-340 PLASTIC BAG, INJECTION (ML) INTRAVENOUS CONTINUOUS PRN
Status: COMPLETED | OUTPATIENT
Start: 2020-06-07 | End: 2020-06-08

## 2020-06-07 RX ORDER — IBUPROFEN 800 MG/1
800 TABLET, FILM COATED ORAL
Status: DISCONTINUED | OUTPATIENT
Start: 2020-06-07 | End: 2020-06-08

## 2020-06-07 RX ORDER — OXYCODONE AND ACETAMINOPHEN 5; 325 MG/1; MG/1
1 TABLET ORAL
Status: DISCONTINUED | OUTPATIENT
Start: 2020-06-07 | End: 2020-06-08

## 2020-06-07 RX ORDER — LIDOCAINE HYDROCHLORIDE 20 MG/ML
JELLY TOPICAL
Status: COMPLETED
Start: 2020-06-07 | End: 2020-06-07

## 2020-06-07 RX ORDER — ACETAMINOPHEN 325 MG/1
650 TABLET ORAL EVERY 4 HOURS PRN
Status: DISCONTINUED | OUTPATIENT
Start: 2020-06-07 | End: 2020-06-08

## 2020-06-07 RX ORDER — OXYTOCIN 10 [USP'U]/ML
10 INJECTION, SOLUTION INTRAMUSCULAR; INTRAVENOUS
Status: DISCONTINUED | OUTPATIENT
Start: 2020-06-07 | End: 2020-06-08

## 2020-06-07 RX ADMIN — LIDOCAINE HYDROCHLORIDE: 20 JELLY TOPICAL at 22:59

## 2020-06-07 ASSESSMENT — MIFFLIN-ST. JEOR: SCORE: 1480.19

## 2020-06-07 NOTE — PROGRESS NOTES
Fetal Non-Stress Test Results    NST Ordered By: SUSAN Champion CNM         NST Start & Stop Times        Start: 11:25  Stop: 12:40          NST Results  Fetus A   Baseline Rate: 135  Accelerations: Present  Decelerations: None  Interpretation: reactive

## 2020-06-07 NOTE — TELEPHONE ENCOUNTER
CC:  Contractions since 5am      Baby#1      10min apart - not weak - not strong       She did note some vaginal bleeding this morning - light but seems regular       Yes some clots       Initially felt dizzy / light headed - now with some nausea       Water not broke         Delivering at [facility]     Pt tele# [phone]       Call to OB floor to see if able to receive pt       Spoke with SHEREE Diaz       Indicated that midwifes will triage their own pts  - suggested I call them for direction      On phone with pt and  - I related that I pete page out to the CNM for further direction - in the mean time, lay on the L side      Paged out to provider       Spoke with CHRISTINE Sheehan - she will call pt to further triage          Albert Valentin RN             Reason for Disposition    Severe bleeding (e.g., continuous red blood from vagina, or large blood clots)    Additional Information    Negative: Passed out (i.e., lost consciousness, collapsed and was not responding)    Negative: Shock suspected (e.g., cold/pale/clammy skin, too weak to stand, low BP, rapid pulse)    Negative: Difficult to awaken or acting confused (e.g., disoriented, slurred speech)    Negative: [1] SEVERE abdominal pain (e.g., excruciating) AND [2] constant AND [3] present > 1 hour    Protocols used: PREGNANCY - LABOR - [id number]

## 2020-06-07 NOTE — LETTER
Heike Musa   [address]  [facility] [address]        June 8, 2020         To Whom it May Concern,     Heike Musa is a patient at [facility]. She is currently hospitalized for the birth of her child. Her mother, Missael Lara, is here as Heike's support person. She will need to be here through the day today (6/8/2020) and tomorrow (6/9/2020). Please excuse Missael from work during this time to be with her daughter. Thank you for your consideration. Please contact us at the number above with questions or concerns.     Sincerely,     SUSAN Kahn CNM

## 2020-06-07 NOTE — TELEPHONE ENCOUNTER
TC mary Burroughs with help of Kazakh . She has been experiencing painful contractions and vaginal bleeding this morning. Also having mucous discharge. States that the contractions are every 10 min or so, and getting stronger. Baby is moving, having periods of activity and rest. She is feeling uncomfortable, and would like to come to the hospital. Discussed that all of the symptoms she is having indicate labor is starting, and it is reasonable to come to the hospital when she is ready. Called charge RN on L&D to notify her that patient will be coming in for labor, and has GDM.  David Sheehan CNM

## 2020-06-07 NOTE — PROVIDER NOTIFICATION
Amara STEWARD CNm at bedside, T strip reviewed with CNM.  Okay for pt to come off monitor per CNM.

## 2020-06-07 NOTE — PLAN OF CARE
Data: Patient presented [facility] the [facility] at 1109.   Reason for maternal/fetal assessment per patient is Laboring  . Patient is a . Prenatal record reviewed.      OB History    Para Term  AB Living   1 0 0 0 0 0   SAB TAB Ectopic Multiple Live Births   0 0 0 0 0      # Outcome Date GA Lbr Nicolás/2nd Weight Sex Delivery Anes PTL Lv   1 Current               Medical History:   Past Medical History:   Diagnosis Date     Migraines    . Gestational Age 39w6d. VSS. Cervix: not examined.  Fetal movement present. Patient denies backache, pelvic pressure, UTI symptoms, GI problems, vaginal bleeding, edema, headache, visual disturbances, epigastric or URQ pain, abdominal pain. Support persons mother present.  Complains of leaking bloody fluid since 0500 and narayan every 10 minutes.  Action: Verbal consent for EFM. Triage assessment completed. EFM applied. Uterine assessment per toco. Fetal assessment: Presumed adequate fetal oxygenation documented (see flow record). Patient education pamphlets given on fetal movement counts and labor precautions. Patient instructed to report change in fetal movement, increased frequency/intensity of UCs, bleeding more than a light period.  Otherwise plan to return to L&D at 2000 this evening.   Response: Amara STEWARD CNM informed of patient arrival, compaints, ROM+ positive. Plan per provider is discharge to home until this evening or sooner if labor gets stronger. Patient verbalized understanding of education and verbalized agreement with plan. Discharged ambulatory at 1325.

## 2020-06-07 NOTE — LETTER
Heike Musa   [address]  [facility] [address]        June 8, 2020         To Whom it May Concern,     Heike Musa is a patient at [facility]. She is currently hospitalized for the birth of her child. Her mother, Jane Canas, is here as Heike's support person. She will need to be here through the day today (6/8/2020) and tomorrow (6/9/2020). Please excuse Missael from work during this time to be with her daughter. Thank you for your consideration. Please contact us at the number above with questions or concerns.     Sincerely,     SUSAN Kahn CNM

## 2020-06-07 NOTE — H&P
HOSPITAL TRIAGE NOTE  ===================    CHIEF COMPLAINT  ========================  Heike Musa is a 30 year old patient presenting today at 39w6d for evaluation of uterine contractions, leaking vaginal fluid. Heike noticed some leaking of clear fluid this morning at 0500. She started having contractions around the same time, and some blood tinged mucous discharge. Feeling baby moving a little less than usual at times, but overall is feeling good movement. Upon presenting to triage, contractions had spaced out to every 10-15 minutes.    Patient's last menstrual period was 2019 (exact date).  Estimated Date of Delivery: 2020     HPI  ==================     Prenatal record and labs reviewed from [facility], through [employer] EMR.    CONTRACTIONS: every 10-15 minutes  ABDOMINAL PAIN: mild  FETAL MOVEMENT: active    VAGINAL BLEEDING: small, spotting and pink  RUPTURE OF MEMBRANES: yes, small, clear  PELVIC PAIN: mild    PREGNANCY COMPLICATIONS: Gestational diabetes, diet controlled      # Pain Assessment:  Current Pain Score 3/25/2020   Patient currently in pain? yes   Heike s pain level was assessed and she currently denies pain.        REVIEW OF SYSTEMS  =====================  C: NEGATIVE for fever, chills  I: NEGATIVE for worrisome rashes, moles or lesions  E: NEGATIVE for vision changes or irritation  R: NEGATIVE for significant cough or SOB  CV: NEGATIVE for chest pain, palpitations or varicosities  GI: NEGATIVE for nausea, abdominal pain, heartburn, or change in bowel habits  : NEGATIVE for frequency, dysuria, or hematuria  M: NEGATIVE for significant arthralgias or myalgia  N: NEGATIVE for headache, weakness, dizziness or paresthesias  P: NEGATIVE for changes in mood or affect    PROBLEM LIST  ===============  Patient Active Problem List    Diagnosis Date Noted     Encounter for triage in pregnant patient 2020     Priority: Medium     Diet controlled gestational diabetes mellitus (GDM)  in second trimester 02/17/2020     Priority: Medium     1 hour GCT 2/17/20  143,   2/24/20 3 HOUR :  88/(199)/(216)/(171)  Abnormal 3 out of 4.  2/26/20 Diabetic ed.       Encounter for supervision of normal first pregnancy in second trimester 01/13/2020     Priority: Medium     1/20/20 [facility] U/S;  20 weeks, PAL 6/8/20, nl fetal survey, placenta posterior and low lying repeat in 6-8 weeks  3/9/20 MFM U/S; 37 0/7weeks posterior placenta no longer low lying, male, 49%tile growth, 2 lbs 3 oz every 4 week growths due to GDM  5/11/20 Growth:  Cephalic fetus.  EFW 6# 9oz, 2966 gm, 74th growth percentile.  MVP 6.5.            Need for Tdap vaccination 12/10/2019     Priority: Medium     declined       Immunization counseling 07/03/2019     Priority: Medium     Immune to Hep A, hep B, and measles       Vertiginous migraine 11/21/2018     Priority: Medium     Cervicalgia 04/03/2018     Priority: Medium       HISTORIES  ==============  ALLERGIES:    No Known Allergies  PAST MEDICAL HISTORY  Past Medical History:   Diagnosis Date     Migraines      SOCIAL HISTORY  Social History     Socioeconomic History     Marital status: Single     Spouse name: Not on file     Number of children: Not on file     Years of education: Not on file     Highest education level: Not on file   Occupational History     Not on file   Social Needs     Financial resource strain: Not on file     Food insecurity     Worry: Not on file     Inability: Not on file     Transportation needs     Medical: Not on file     Non-medical: Not on file   Tobacco Use     Smoking status: Never Smoker     Smokeless tobacco: Never Used   Substance and Sexual Activity     Alcohol use: No     Drug use: No     Sexual activity: Yes     Partners: Male   Lifestyle     Physical activity     Days per week: Not on file     Minutes per session: Not on file     Stress: Not at all   Relationships     Social connections     Talks on phone: Not on file     Gets together: Not on file      Attends [employer] service: Not on file     Active member of club or organization: Not on file     Attends meetings of clubs or organizations: Not on file     Relationship status:      Intimate partner violence     Fear of current or ex partner: No     Emotionally abused: No     Physically abused: No     Forced sexual activity: No   Other Topics Concern     Not on file   Social History Narrative     Not on file       FAMILY HISTORY  Family History   Problem Relation Age of Onset     Hypertension Mother      OB HISTORY  OB History    Para Term  AB Living   1 0 0 0 0 0   SAB TAB Ectopic Multiple Live Births   0 0 0 0 0      # Outcome Date GA Lbr Nicolás/2nd Weight Sex Delivery Anes PTL Lv   1 Current              Prenatal Labs:   Lab Results   Component Value Date    ABO O 12/10/2019    RH Pos 12/10/2019    AS Neg 12/10/2019    HEPBANG Nonreactive 12/10/2019    RUQIGG 49 12/10/2019    HGB 12.5 2020     Rubella- immune  Blood glucose: 73    ULTRASOUND(s) reviewed: last US was  and showed EFW 6lb 9oz, 73%ile    EXAM  ============  /81   Temp 98.3  F (36.8  C) (Oral)   Resp 16   LMP 2019 (Exact Date)   GENERAL APPEARANCE: healthy, alert and no distress  RESP: lungs clear to auscultation - no rales, rhonchi or wheezes  BREAST: normal without masses, tenderness or nipple discharge and no palpable axillary masses or adenopathy  CV: regular rates and rhythm, normal S1 S2, no S3 or S4 and no murmur,and no varicosities  ABDOMEN:  soft, nontender, no epigastric pain  SKIN: no suspicious lesions or rashes  NEURO: Denies headache, blurred vision, other vision changes  PSYCH: mentation appears normal. and affect normal/bright  MS/ LEGS: Reflexes normal bilaterally    CONTRACTIONS: every 10-15 minutes   FETAL HEART TONES: continuous EFM- baseline 135 with moderate variability and positive accelerations. No decelerations.  NST: REACTIVE  EFW: 7lb    PELVIC EXAM: deferred      ROM: yes, small,  clear and pink-tinged  ROMPLUS: positive    LABS: COVID-19 test collected, per unit protocol    DIAGNOSIS  ============  39w6d seen on the [facility] Triage for labor evaluation- not in labor and for vaginal discharge with PROM, not in labor  A1GDM  NST: REACTIVE  Fetal Heart rate tracing:category one    PLAN  ============  Long discussion with Heike about rupture of membranes and increasing risk of infection  Discussed that majority of patients will begin spontaneous labor within 12 hours of rupture, and that infection risk increases over time  Reviewed further recommendation to start labor based on A1GDM status  Heike declines recommendation for labor augmentation now; she elects to return home and eat some food and come back to unit at 8pm for augmentation  Discharge to home with labor instuctions per discharge instruction form  Call or return to the Birthplace earlier than 8pm with contractions, cramping, abdominal or pelvic pain, vaginal bleeding, or decreased fetal movement.  Plan to return to unit at 8pm for augmentation    Amara Rivera APRN CNM

## 2020-06-07 NOTE — DISCHARGE INSTRUCTIONS
Discharge Instruction for Undelivered Patients      You were seen for: Labor Assessment and Membrane Assessment  We Consulted: Amara KING  You had (Test or Medicine): ROM+ to see if water broken, fetal monitoring, glucose check.    Diet:   Drink 8 to 12 glasses of liquids (milk, juice, water) every day.  You may eat meals and snacks.     Activity:  Call your doctor or nurse midwife if your baby is moving less than usual.     Call your provider if you notice:  Swelling in your face or increased swelling in your hands or legs.  Headaches that are not relieved by Tylenol (acetaminophen).  Changes in your vision (blurring: seeing spots or stars.)  Nausea (sick to your stomach) and vomiting (throwing up).   Weight gain of 5 pounds or more per week.  Heartburn that doesn't go away.  Signs of bladder infection: pain when you urinate (use the toilet), need to go more often and more urgently.  The bag of holguin (rupture of membranes) breaks, or you notice leaking in your underwear.  Bright red blood in your underwear.  Abdominal (lower belly) or stomach pain.  For first baby: Contractions (tightening) less than 5 minutes apart for one hour or more.  Increase or change in vaginal discharge (note the color and amount)  Other: Nothing in the vagina to avoid infection.    Follow-up:  Plan to return to labor and delivery around 8:00pm this evening.

## 2020-06-08 ENCOUNTER — ANESTHESIA (OUTPATIENT)
Dept: OBGYN | Facility: CLINIC | Age: 30
End: 2020-06-08
Payer: COMMERCIAL

## 2020-06-08 ENCOUNTER — ANESTHESIA EVENT (OUTPATIENT)
Dept: OBGYN | Facility: CLINIC | Age: 30
End: 2020-06-08
Payer: COMMERCIAL

## 2020-06-08 ENCOUNTER — APPOINTMENT (OUTPATIENT)
Dept: INTERPRETER SERVICES | Facility: CLINIC | Age: 30
End: 2020-06-08
Payer: COMMERCIAL

## 2020-06-08 LAB
ABO + RH BLD: NORMAL
ABO + RH BLD: NORMAL
ALT SERPL W P-5'-P-CCNC: 22 U/L (ref 0–50)
AST SERPL W P-5'-P-CCNC: 18 U/L (ref 0–45)
BASOPHILS # BLD AUTO: 0 10E9/L (ref 0–0.2)
BASOPHILS NFR BLD AUTO: 0.1 %
BLD GP AB SCN SERPL QL: NORMAL
BLOOD BANK CMNT PATIENT-IMP: NORMAL
CREAT SERPL-MCNC: 0.47 MG/DL (ref 0.52–1.04)
CREAT UR-MCNC: 56 MG/DL
DIFFERENTIAL METHOD BLD: NORMAL
EOSINOPHIL # BLD AUTO: 0 10E9/L (ref 0–0.7)
EOSINOPHIL NFR BLD AUTO: 0.3 %
ERYTHROCYTE [DISTWIDTH] IN BLOOD BY AUTOMATED COUNT: 12.7 % (ref 10–15)
ERYTHROCYTE [DISTWIDTH] IN BLOOD BY AUTOMATED COUNT: 13 % (ref 10–15)
GFR SERPL CREATININE-BSD FRML MDRD: >90 ML/MIN/{1.73_M2}
GLUCOSE BLDC GLUCOMTR-MCNC: 133 MG/DL (ref 70–99)
GLUCOSE BLDC GLUCOMTR-MCNC: 151 MG/DL (ref 70–99)
GLUCOSE BLDC GLUCOMTR-MCNC: 163 MG/DL (ref 70–99)
GLUCOSE BLDC GLUCOMTR-MCNC: 76 MG/DL (ref 70–99)
GLUCOSE BLDC GLUCOMTR-MCNC: 85 MG/DL (ref 70–99)
GLUCOSE BLDC GLUCOMTR-MCNC: 85 MG/DL (ref 70–99)
HCT VFR BLD AUTO: 37.3 % (ref 35–47)
HCT VFR BLD AUTO: 38.6 % (ref 35–47)
HGB BLD-MCNC: 12.4 G/DL (ref 11.7–15.7)
HGB BLD-MCNC: 12.8 G/DL (ref 11.7–15.7)
IMM GRANULOCYTES # BLD: 0.1 10E9/L (ref 0–0.4)
IMM GRANULOCYTES NFR BLD: 0.7 %
LYMPHOCYTES # BLD AUTO: 1.5 10E9/L (ref 0.8–5.3)
LYMPHOCYTES NFR BLD AUTO: 15 %
MCH RBC QN AUTO: 30.4 PG (ref 26.5–33)
MCH RBC QN AUTO: 30.7 PG (ref 26.5–33)
MCHC RBC AUTO-ENTMCNC: 33.2 G/DL (ref 31.5–36.5)
MCHC RBC AUTO-ENTMCNC: 33.2 G/DL (ref 31.5–36.5)
MCV RBC AUTO: 91 FL (ref 78–100)
MCV RBC AUTO: 93 FL (ref 78–100)
MONOCYTES # BLD AUTO: 1 10E9/L (ref 0–1.3)
MONOCYTES NFR BLD AUTO: 10.3 %
NEUTROPHILS # BLD AUTO: 7.2 10E9/L (ref 1.6–8.3)
NEUTROPHILS NFR BLD AUTO: 73.6 %
NRBC # BLD AUTO: 0 10*3/UL
NRBC BLD AUTO-RTO: 0 /100
PLATELET # BLD AUTO: 241 10E9/L (ref 150–450)
PLATELET # BLD AUTO: 253 10E9/L (ref 150–450)
PROT UR-MCNC: 0.07 G/L
PROT/CREAT 24H UR: 0.13 G/G CR (ref 0–0.2)
RBC # BLD AUTO: 4.08 10E12/L (ref 3.8–5.2)
RBC # BLD AUTO: 4.17 10E12/L (ref 3.8–5.2)
SPECIMEN EXP DATE BLD: NORMAL
T PALLIDUM AB SER QL: NONREACTIVE
WBC # BLD AUTO: 11.5 10E9/L (ref 4–11)
WBC # BLD AUTO: 9.8 10E9/L (ref 4–11)

## 2020-06-08 PROCEDURE — 0DQR0ZZ REPAIR ANAL SPHINCTER, OPEN APPROACH: ICD-10-PCS | Performed by: ADVANCED PRACTICE MIDWIFE

## 2020-06-08 PROCEDURE — 12000001 ZZH R&B MED SURG/OB UMMC

## 2020-06-08 PROCEDURE — 25000132 ZZH RX MED GY IP 250 OP 250 PS 637: Performed by: ADVANCED PRACTICE MIDWIFE

## 2020-06-08 PROCEDURE — 00000146 ZZHCL STATISTIC GLUCOSE BY METER IP

## 2020-06-08 PROCEDURE — 82565 ASSAY OF CREATININE: CPT | Performed by: ADVANCED PRACTICE MIDWIFE

## 2020-06-08 PROCEDURE — 59300 EPISIOTOMY OR VAGINAL REPAIR: CPT | Performed by: OBSTETRICS & GYNECOLOGY

## 2020-06-08 PROCEDURE — 84156 ASSAY OF PROTEIN URINE: CPT | Performed by: ADVANCED PRACTICE MIDWIFE

## 2020-06-08 PROCEDURE — 85027 COMPLETE CBC AUTOMATED: CPT | Performed by: ADVANCED PRACTICE MIDWIFE

## 2020-06-08 PROCEDURE — 84460 ALANINE AMINO (ALT) (SGPT): CPT | Performed by: ADVANCED PRACTICE MIDWIFE

## 2020-06-08 PROCEDURE — 25000125 ZZHC RX 250: Performed by: ANESTHESIOLOGY

## 2020-06-08 PROCEDURE — 3E0R3BZ INTRODUCTION OF ANESTHETIC AGENT INTO SPINAL CANAL, PERCUTANEOUS APPROACH: ICD-10-PCS | Performed by: ANESTHESIOLOGY

## 2020-06-08 PROCEDURE — 25000125 ZZHC RX 250

## 2020-06-08 PROCEDURE — 59400 OBSTETRICAL CARE: CPT | Performed by: ADVANCED PRACTICE MIDWIFE

## 2020-06-08 PROCEDURE — 72200001 ZZH LABOR CARE VAGINAL DELIVERY SINGLE

## 2020-06-08 PROCEDURE — 84450 TRANSFERASE (AST) (SGOT): CPT | Performed by: ADVANCED PRACTICE MIDWIFE

## 2020-06-08 PROCEDURE — 36415 COLL VENOUS BLD VENIPUNCTURE: CPT | Performed by: ADVANCED PRACTICE MIDWIFE

## 2020-06-08 PROCEDURE — 25000125 ZZHC RX 250: Performed by: ADVANCED PRACTICE MIDWIFE

## 2020-06-08 PROCEDURE — 25800030 ZZH RX IP 258 OP 636: Performed by: ADVANCED PRACTICE MIDWIFE

## 2020-06-08 PROCEDURE — 25000128 H RX IP 250 OP 636

## 2020-06-08 PROCEDURE — 00HU33Z INSERTION OF INFUSION DEVICE INTO SPINAL CANAL, PERCUTANEOUS APPROACH: ICD-10-PCS | Performed by: ANESTHESIOLOGY

## 2020-06-08 RX ORDER — NALOXONE HYDROCHLORIDE 0.4 MG/ML
.1-.4 INJECTION, SOLUTION INTRAMUSCULAR; INTRAVENOUS; SUBCUTANEOUS
Status: DISCONTINUED | OUTPATIENT
Start: 2020-06-08 | End: 2020-06-10 | Stop reason: HOSPADM

## 2020-06-08 RX ORDER — HYDROCORTISONE 2.5 %
CREAM (GRAM) TOPICAL 3 TIMES DAILY PRN
Status: DISCONTINUED | OUTPATIENT
Start: 2020-06-08 | End: 2020-06-10 | Stop reason: HOSPADM

## 2020-06-08 RX ORDER — AMOXICILLIN 250 MG
2 CAPSULE ORAL 2 TIMES DAILY
Status: DISCONTINUED | OUTPATIENT
Start: 2020-06-08 | End: 2020-06-10 | Stop reason: HOSPADM

## 2020-06-08 RX ORDER — IBUPROFEN 800 MG/1
800 TABLET, FILM COATED ORAL EVERY 6 HOURS PRN
Status: DISCONTINUED | OUTPATIENT
Start: 2020-06-08 | End: 2020-06-10 | Stop reason: HOSPADM

## 2020-06-08 RX ORDER — AMOXICILLIN 250 MG
1 CAPSULE ORAL 2 TIMES DAILY
Status: DISCONTINUED | OUTPATIENT
Start: 2020-06-08 | End: 2020-06-10 | Stop reason: HOSPADM

## 2020-06-08 RX ORDER — OXYTOCIN/0.9 % SODIUM CHLORIDE 30/500 ML
340 PLASTIC BAG, INJECTION (ML) INTRAVENOUS CONTINUOUS PRN
Status: DISCONTINUED | OUTPATIENT
Start: 2020-06-08 | End: 2020-06-10 | Stop reason: HOSPADM

## 2020-06-08 RX ORDER — NEOMYCIN/BACITRACIN/POLYMYXINB 3.5-400-5K
OINTMENT (GRAM) TOPICAL 4 TIMES DAILY
Status: DISCONTINUED | OUTPATIENT
Start: 2020-06-09 | End: 2020-06-10 | Stop reason: HOSPADM

## 2020-06-08 RX ORDER — LIDOCAINE HYDROCHLORIDE 20 MG/ML
JELLY TOPICAL EVERY 4 HOURS PRN
Status: DISCONTINUED | OUTPATIENT
Start: 2020-06-08 | End: 2020-06-08

## 2020-06-08 RX ORDER — NALBUPHINE HYDROCHLORIDE 20 MG/ML
2.5-5 INJECTION, SOLUTION INTRAMUSCULAR; INTRAVENOUS; SUBCUTANEOUS EVERY 6 HOURS PRN
Status: DISCONTINUED | OUTPATIENT
Start: 2020-06-08 | End: 2020-06-08

## 2020-06-08 RX ORDER — OXYTOCIN 10 [USP'U]/ML
10 INJECTION, SOLUTION INTRAMUSCULAR; INTRAVENOUS
Status: DISCONTINUED | OUTPATIENT
Start: 2020-06-08 | End: 2020-06-10 | Stop reason: HOSPADM

## 2020-06-08 RX ORDER — MISOPROSTOL 100 UG/1
25 TABLET ORAL
Status: DISCONTINUED | OUTPATIENT
Start: 2020-06-08 | End: 2020-06-08

## 2020-06-08 RX ORDER — DEXTROSE MONOHYDRATE 25 G/50ML
25-50 INJECTION, SOLUTION INTRAVENOUS
Status: DISCONTINUED | OUTPATIENT
Start: 2020-06-08 | End: 2020-06-08

## 2020-06-08 RX ORDER — OXYTOCIN 10 [USP'U]/ML
INJECTION, SOLUTION INTRAMUSCULAR; INTRAVENOUS
Status: DISCONTINUED
Start: 2020-06-08 | End: 2020-06-08 | Stop reason: HOSPADM

## 2020-06-08 RX ORDER — FENTANYL/BUPIVACAINE/NS/PF 2-1250MCG
PLASTIC BAG, INJECTION (ML) INJECTION
Status: COMPLETED
Start: 2020-06-08 | End: 2020-06-08

## 2020-06-08 RX ORDER — TERBUTALINE SULFATE 1 MG/ML
0.25 INJECTION, SOLUTION SUBCUTANEOUS
Status: DISCONTINUED | OUTPATIENT
Start: 2020-06-08 | End: 2020-06-08

## 2020-06-08 RX ORDER — NALOXONE HYDROCHLORIDE 0.4 MG/ML
.1-.4 INJECTION, SOLUTION INTRAMUSCULAR; INTRAVENOUS; SUBCUTANEOUS
Status: DISCONTINUED | OUTPATIENT
Start: 2020-06-08 | End: 2020-06-08

## 2020-06-08 RX ORDER — LIDOCAINE 40 MG/G
CREAM TOPICAL
Status: DISCONTINUED | OUTPATIENT
Start: 2020-06-08 | End: 2020-06-08

## 2020-06-08 RX ORDER — OXYTOCIN/0.9 % SODIUM CHLORIDE 30/500 ML
PLASTIC BAG, INJECTION (ML) INTRAVENOUS
Status: DISCONTINUED
Start: 2020-06-08 | End: 2020-06-08 | Stop reason: HOSPADM

## 2020-06-08 RX ORDER — OXYTOCIN/0.9 % SODIUM CHLORIDE 30/500 ML
100 PLASTIC BAG, INJECTION (ML) INTRAVENOUS CONTINUOUS
Status: DISCONTINUED | OUTPATIENT
Start: 2020-06-08 | End: 2020-06-10 | Stop reason: HOSPADM

## 2020-06-08 RX ORDER — DEXTROSE MONOHYDRATE 25 G/50ML
25-50 INJECTION, SOLUTION INTRAVENOUS
Status: DISCONTINUED | OUTPATIENT
Start: 2020-06-08 | End: 2020-06-10 | Stop reason: HOSPADM

## 2020-06-08 RX ORDER — LIDOCAINE HYDROCHLORIDE 10 MG/ML
INJECTION, SOLUTION EPIDURAL; INFILTRATION; INTRACAUDAL; PERINEURAL
Status: COMPLETED
Start: 2020-06-08 | End: 2020-06-08

## 2020-06-08 RX ORDER — OXYTOCIN/0.9 % SODIUM CHLORIDE 30/500 ML
1-24 PLASTIC BAG, INJECTION (ML) INTRAVENOUS CONTINUOUS
Status: DISCONTINUED | OUTPATIENT
Start: 2020-06-08 | End: 2020-06-08

## 2020-06-08 RX ORDER — MISOPROSTOL 200 UG/1
TABLET ORAL
Status: DISCONTINUED
Start: 2020-06-08 | End: 2020-06-08 | Stop reason: HOSPADM

## 2020-06-08 RX ORDER — MODIFIED LANOLIN
OINTMENT (GRAM) TOPICAL
Status: DISCONTINUED | OUTPATIENT
Start: 2020-06-08 | End: 2020-06-10 | Stop reason: HOSPADM

## 2020-06-08 RX ORDER — NICOTINE POLACRILEX 4 MG
15-30 LOZENGE BUCCAL
Status: DISCONTINUED | OUTPATIENT
Start: 2020-06-08 | End: 2020-06-08

## 2020-06-08 RX ORDER — NICOTINE POLACRILEX 4 MG
15-30 LOZENGE BUCCAL
Status: DISCONTINUED | OUTPATIENT
Start: 2020-06-08 | End: 2020-06-10 | Stop reason: HOSPADM

## 2020-06-08 RX ORDER — BISACODYL 10 MG
10 SUPPOSITORY, RECTAL RECTAL DAILY PRN
Status: DISCONTINUED | OUTPATIENT
Start: 2020-06-10 | End: 2020-06-10 | Stop reason: HOSPADM

## 2020-06-08 RX ORDER — SODIUM CHLORIDE 9 MG/ML
INJECTION, SOLUTION INTRAVENOUS CONTINUOUS
Status: DISCONTINUED | OUTPATIENT
Start: 2020-06-08 | End: 2020-06-08

## 2020-06-08 RX ORDER — EPHEDRINE SULFATE 50 MG/ML
5 INJECTION, SOLUTION INTRAMUSCULAR; INTRAVENOUS; SUBCUTANEOUS
Status: DISCONTINUED | OUTPATIENT
Start: 2020-06-08 | End: 2020-06-08

## 2020-06-08 RX ORDER — ACETAMINOPHEN 325 MG/1
650 TABLET ORAL EVERY 4 HOURS PRN
Status: DISCONTINUED | OUTPATIENT
Start: 2020-06-08 | End: 2020-06-10 | Stop reason: HOSPADM

## 2020-06-08 RX ADMIN — Medication: at 20:03

## 2020-06-08 RX ADMIN — Medication 25 MCG: at 03:56

## 2020-06-08 RX ADMIN — Medication 2 MILLI-UNITS/MIN: at 16:17

## 2020-06-08 RX ADMIN — Medication 25 MCG: at 06:58

## 2020-06-08 RX ADMIN — Medication: at 13:46

## 2020-06-08 RX ADMIN — SODIUM CHLORIDE, POTASSIUM CHLORIDE, SODIUM LACTATE AND CALCIUM CHLORIDE: 600; 310; 30; 20 INJECTION, SOLUTION INTRAVENOUS at 14:01

## 2020-06-08 RX ADMIN — SODIUM CHLORIDE, POTASSIUM CHLORIDE, SODIUM LACTATE AND CALCIUM CHLORIDE: 600; 310; 30; 20 INJECTION, SOLUTION INTRAVENOUS at 19:59

## 2020-06-08 RX ADMIN — LIDOCAINE HYDROCHLORIDE 300 MG: 10 INJECTION, SOLUTION EPIDURAL; INFILTRATION; INTRACAUDAL; PERINEURAL at 19:24

## 2020-06-08 RX ADMIN — Medication 25 MCG: at 09:06

## 2020-06-08 RX ADMIN — Medication 340 ML/HR: at 20:36

## 2020-06-08 RX ADMIN — SODIUM CHLORIDE, POTASSIUM CHLORIDE, SODIUM LACTATE AND CALCIUM CHLORIDE 500 ML: 600; 310; 30; 20 INJECTION, SOLUTION INTRAVENOUS at 12:12

## 2020-06-08 RX ADMIN — IBUPROFEN 800 MG: 800 TABLET, FILM COATED ORAL at 22:54

## 2020-06-08 RX ADMIN — Medication 8 ML: at 13:43

## 2020-06-08 RX ADMIN — Medication 25 MCG: at 01:10

## 2020-06-08 NOTE — PROGRESS NOTES
S:  Heike has been walking around her room and breathing through contractions, with support of her mom, Missael. She is hoping to get some rest now and feels she can rest between ctx. Contractions have more regular pattern than on admission, but still spaced.    O:  /75   Pulse 69   Temp 97.9  F (36.6  C) (Oral)   Resp 16   LMP 2019 (Exact Date)   FHT: baseline: 130; variability: moderate with short periods of minimal; accels: yes; decels: no decels in current section of FHT, but short period of repetitive late decels from 0312 to 0330; ctx: q 7 minutes, lasting  sec    SVE: deferred    Labor Course:   0500 PROM for clear fluid   1300 evaluation in triage with reactive NST   0100 oral miso x 1    A:   at 40w0d, here for augmentation after PROM  A1GDM  Labor status: latent labor  GBS neg  Fetus Cat I currently  COVID-19 neg    P:  Continue misoprostol for cervical ripening  Reassess in 2-4 hours or PRN    Danielle Cornelius, SUSAN KING

## 2020-06-08 NOTE — H&P
Heike Musa is a 30 year old female,    Patient's last menstrual period was 2019 (exact date)., Estimated Date of Delivery: 2020 is calculated from lmp and verified with U/S     Pt is admitted to the [facility] on 2020 at 11:07 PM    ruptured with no labor.  Membranes are ruptured since  0500 and verified with ROM plus earlier this afternoon.    HPI: Heike awoke this morning at 0500 with leaking of clear fluid, blood tinged mucous, and some uterine contractions. She presented to triage where ROM plus was positive at 1300 and she was having contractions every 10-15 min. Fetus MICHAEL and vertex by BSUS. After discussion of risks and benefits of expectant management versus augmentation, Heike elected to return home and return to hospital this evening for augmentation if labor did not begin spontaneously. She was counseled about increasing risk of infection with increasing time after rupture. NST was reactive.     Heike returned to hospital at 2200. No changes in labor symptoms.    PRENATAL COURSE  Prenatal care began at 13 wks gestation for a total of 14 prenatal visits.  Total wt gain 21 lb  Prenatal vital signs WNL  Prenatal course was complicated by    Patient Active Problem List    Diagnosis Date Noted     Encounter for triage in pregnant patient 2020     Priority: Medium     Labor and delivery, indication for care 2020     Priority: Medium     Diet controlled gestational diabetes mellitus (GDM) in second trimester 2020     Priority: Medium     1 hour GCT 20  143,   20 3 HOUR :  88/(199)/(216)/(171)  Abnormal 3 out of 4.  20 Diabetic ed.       Encounter for supervision of normal first pregnancy in second trimester 2020     Priority: Medium     20 [facility] U/S;  20 weeks, PAL 20, nl fetal survey, placenta posterior and low lying repeat in 6-8 weeks  3/9/20 MFM U/S; 37 0/7weeks posterior placenta no longer low lying, male, 49%tile growth, 2 lbs 3  oz every 4 week growths due to GDM  20 Growth:  Cephalic fetus.  EFW 6# 9oz, 2966 gm, 74th growth percentile.  MVP 6.5.            Need for Tdap vaccination 12/10/2019     Priority: Medium     declined       Immunization counseling 2019     Priority: Medium     Immune to Hep A, hep B, and measles       Vertiginous migraine 2018     Priority: Medium     Cervicalgia 2018     Priority: Medium       HISTORIES  No Known Allergies  Past Medical History:   Diagnosis Date     Migraines      No past surgical history on file.  Family History   Problem Relation Age of Onset     Hypertension Mother      Social History     Tobacco Use     Smoking status: Never Smoker     Smokeless tobacco: Never Used   Substance Use Topics     Alcohol use: No     OB History    Para Term  AB Living   1 0 0 0 0 0   SAB TAB Ectopic Multiple Live Births   0 0 0 0 0      # Outcome Date GA Lbr Nicolás/2nd Weight Sex Delivery Anes PTL Lv   1 Current                LABS:    Lab Results   Component Value Date    ABO O 12/10/2019       Lab Results   Component Value Date    RH Pos 12/10/2019     Rhogam not indicated  Rubella immune  RPR nonreactive  HIV nonreactive    Lab Results   Component Value Date    HGB 12.5 2020      Lab Results   Component Value Date    HEPBANG Nonreactive 12/10/2019     Lab Results   Component Value Date    GBS Negative 2020       ROS  Pt denies significant constitutional symptoms including fever and/or malaise.  Pt denies significant respiratory, cardiovacular, GI, or muscular/skeletal complaints.      PHYSICAL EXAM:  Vital signs stable, afebrile and BP is BP (!) 136/90   Temp 98.8  F (37.1  C)   LMP 2019 (Exact Date)   General appearance healthy, alert and active  Heart: RRR without murmur  Lungs:  clear to auscultation   Neuro:  denies headache and visual disturbances  Psych: Mentation normal and bright   Legs: 2+/2+, no clonus, no edema     Abdomen: gravid, single vertex  fetus, non-tender, EFW 7 lbs. Pt is narayan every 7-10 minutes, lasting 120 seconds and palpates mild    FETAL HEART TONES: baseline 135 with moderate FHRV variability and accelerations. No decelerations present.     PELVIC EXAM: 1/ 50%/ Posterior/ soft/ -3; exams are uncomfortable due to type III infibulation; lidocaine gel used  BLOODY SHOW:: yes, blood tinged mucous  Membranes as listed above, ROM plus positive    ASSESSMENT:  IUP @ 40 gestation  ruptured with no labor  A1GDM  NST  reactive   Fetal-maternal status reassuring  Parity: Primip  GBS negative and membranes ruptured for 19 hours      PLAN:  Admit - see IP orders  cervical ripening with oral misoprostol  Extended conversation with assistance of [place]  via iPad about recommendation for augmentation because of PROM without labor. Discussion of risks and benefits of augmentation, including risks and benefits of misoprostol medication, Pitocin augmentation, pain medication, and increasing risk of infection with prolonged ROM.\  Reassess in 2-4 hours    SUSAN Champion CNM

## 2020-06-08 NOTE — PROVIDER NOTIFICATION
06/08/20 1117   Provider Notification   Provider Name/Title tatiana murrell cnm   Method of Notification Electronic Page   1 hr post meal [id number].

## 2020-06-08 NOTE — PLAN OF CARE
Summary:  pt remained afrebile.  Leaking clear fluid with pinkish show.  SBP increased to 140's just pre and remained post epidural.  No complaints of HA, vision changes or epigastric pain.  KIRA Mcqueen CNM updated.  Plan for labs & urine.  Pt agreed.  Pt is now in active labor and SAW Mcqueen CNM will place GDM orders.  Pt declines AROM of forebag.  CNM and pt discussed oxytocin.  Pt agreeable.  FHR periods of minimal and moderate variability.  Occ accels.  KIRA ROBERT CNM reviewed tracing.  Lidocaine topical scanned prior to SVE at 1500.  Pt is comfortable after epidural.  Pt states understanding of fall risk and pcea use.  Call light is within reach.  Pt's mother present.  [employer]  used via ipad throughout shift.  Shift change bedside report to SHEREE Alvarez.

## 2020-06-08 NOTE — PROVIDER NOTIFICATION
06/08/20 1344   Provider Notification   Provider Name/Title KIRA Flores CNM   Method of Notification Electronic Page   Request Evaluate - Remote   elevated BPs.  Had epidural.

## 2020-06-08 NOTE — PROVIDER NOTIFICATION
06/08/20 1144   Provider Notification   Provider Name/Title deirdre murrell cnm   Method of Notification Electronic Page   minimum variability asked for LR bolus.

## 2020-06-08 NOTE — PROGRESS NOTES
S:  Heike is resting and sleeping through contractions.     O:  /75   Pulse 69   Temp 98.1  F (36.7  C) (Oral)   Resp 16   LMP 2019 (Exact Date)   FHT: baseline: 125; variability: moderate with period of minimal between 7043-9251; accels: yes; decels: None at this time, short period of recurrent late decel from 5681-0595; ctx: q 7-8 min    Labor Course:   0500 PROM for clear fluid   1300 evaluation in triage with reactive NST   0100 oral miso x1, 0356 oral miso second dose    A:   at 40w0d, here for augmentation after PROM  A1GDM  Labor status: latent labor  GBS neg  Fetus Cat I with short period of Cat II, resolved spontaneously  COVID-19 neg    P:  Page from RN at 0600 to discuss FHT and plan for additional miso. Reviewed FHT strip and resolved period of minimal variability with late decels. Currently, no concern for fetal acidemia, evidenced by accels and moderate variability.  Continue misoprostol for cervical ripening  Plan change of shift handoff with oncoming CNM at 0700    SUSAN Champion CNM

## 2020-06-08 NOTE — PROVIDER NOTIFICATION
"   06/08/20 1106   Provider Notification   Provider Name/Title KIRA Flores cnm   Method of Notification Electronic Page   ctxs q 3\" palpate moderate.  miso po due at 1100 held.   "

## 2020-06-08 NOTE — PROVIDER NOTIFICATION
06/08/20 1758   Provider Notification   Provider Name/Title SAW Saunders CNM   Method of Notification Electronic Page   Request Evaluate - Remote   Notification Reason Variability Change     Mnimal variability, no accels. Repositioned. Giving IV bolus now. Pit at 4u.

## 2020-06-08 NOTE — PROVIDER NOTIFICATION
06/07/20 6807   Provider Notification   Provider Name/Title YASMANY Cornelius CNM   Method of Notification At Bedside   Request Evaluate in Person   Notification Reason SVE;Labor Status     Provider at bedside to perform SVE and discuss plan to progress labor.

## 2020-06-08 NOTE — PROVIDER NOTIFICATION
06/08/20 1208   Provider Notification   Provider Name/Title KIRA AGUILAR CNM   Method of Notification Electronic Page   PT WANTS TO DISCUSS PAIN MEDS. PLEASE COME TO ROOM

## 2020-06-08 NOTE — ANESTHESIA PROCEDURE NOTES
Epidural Procedure Note  Staff -   Anesthesiologist:  May De La Cruz MD      Performed By: anesthesiologist          Location: OB     Procedure start time:  6/8/2020 1:20 PM     Procedure end time:  6/8/2020 1:41 PM   Pre-procedure checklist:   patient identified, IV checked, site marked, risks and benefits discussed, informed consent, monitors and equipment checked, pre-op evaluation, at physician/surgeon's request and post-op pain management      Correct Patient: Yes      Correct Position: Yes      Correct Site: Yes      Correct Procedure: Yes      Correct Laterality:  Yes    Site Marked:  Yes  Procedure:     Procedure:  Epidural catheter    ASA:  2    Position:  Sitting    Sterile Prep: Betadine      Local skin infiltration:  1% lidocaine    Approach:  Midline    Needle gauge (G):  18    Needle Length (in):  3.5    Block Needle Type:  Touhmaureen    Injection Technique:  LORT saline    ART at (cm):  5    Attempts:  1    Redirects:  0    Catheter gauge (G):  20    Catheter threaded easily: Yes      Threaded to cm at skin:  10    Threaded in epidural space (cm):  5    Paresthesias:  No    Aspiration negative for Heme or CSF: Yes      Test dose (mL):  5    Test dose time:  01:25    Test dose negative for signs of intravascular, subdural or intrathecal injection: Yes    Assessment/Narrative:      Easily placed

## 2020-06-08 NOTE — PROVIDER NOTIFICATION
06/08/20 0600   Provider Notification   Provider Name/Title YASMANY Cornelius CNM   Method of Notification Electronic Page   Request Evaluate - Remote   Notification Reason Labor Status     Page sent to provider to clarify plan for this AM.

## 2020-06-08 NOTE — PLAN OF CARE
Pt is 40.0 wks .  SROM since  0500 clear fluid.  Last SVE /-3.  GDM - diet controlled.  GBS-.  Pt had 3d dose miso po at 0700.  Pt is here with her mother, Surer.  Pt is has saline lock.  Pt is coping by breathing thru ctxs.  Comfort measures offered.  Pt declines at this time.  At this time, would like to go natural.  Pt states understanding to let me know about meals as checking 1 hr post meals.  Afrebile.  Ipad [employer]  used and is avail in room.   with periods of minimal and moderate variability.  No decels and accels present.  Repositioned to lateral.  Pt encouraged to drink water.  Call light is within reach.

## 2020-06-08 NOTE — PROGRESS NOTES
Late entry from 3:30 pm   S: Patient is doing well. Comfortable with epidural. Cervical check /-2 intact. Offered AROM but patient declined at that time. Would prefer SROM. Discussed pitocin due to spaced out contractions. Agreed to this. Pitocin ordered. Patient having slightly elevated BP. Labs drawn and WNL. No within treatment range. Diabetic active orders started. Discussed all of this via ipad . She has no further questions or concerns at this time.     O:  Blood pressure 130/76, pulse 58, temperature 97.7  F (36.5  C), temperature source Oral, resp. rate 18, last menstrual period 2019, SpO2 98 %, not currently breastfeeding.  General appearance: comfortable  CONTACTIONS: Contractions every 4-6 minutes.  Palpate: moderate  FETAL HEART TONES: baseline 120 with moderate FHR variability and    accelerations no. Decelerations no.    NST: REACTIVE  ROM: clear fluid  PELVIC EXAM: PELVIC EXAM: / Mid/ average/ -3   Fetal Position: cephalic   Bloody show: Yes   Pitocin- none,  Antibiotics- none  Cervical ripening: N/A    ASSESSMENT:  ==============  IUP @ 40w0d active labor   Fetal Heart rate tracing category one  GBS- negative     PLAN:  ===========  Pain medication with epidural   Anticipate   Labor induction with Pitocin  Reevaluate in 2-4 hours/PRN     SUSAN Kahn CNM

## 2020-06-08 NOTE — ANESTHESIA PREPROCEDURE EVALUATION
"Anesthesia Pre-Procedure Evaluation    Patient: Heike Musa   MRN:     [id number] Gender:   female   Age:    30 year old :      1990        Preoperative Diagnosis: * No pre-op diagnosis entered *   * No procedures listed *     LABS:  CBC:   Lab Results   Component Value Date    WBC 9.8 2020    WBC 11.2 (H) 2020    HGB 12.4 2020    HGB 12.5 2020    HCT 37.3 2020    HCT 40.1 2020     2020     2020     BMP: No results found for: NA, POTASSIUM, CHLORIDE, CO2, BUN, CR, GLC  COAGS: No results found for: PTT, INR, FIBR  POC:   Lab Results   Component Value Date     (H) 2020    HCG Negative 2018     OTHER: No results found for: PH, LACT, A1C, YESSI, PHOS, MAG, ALBUMIN, PROTTOTAL, ALT, AST, GGT, ALKPHOS, BILITOTAL, BILIDIRECT, LIPASE, AMYLASE, LINDA, TSH, T4, T3, CRP, SED     Preop Vitals    BP Readings from Last 3 Encounters:   20 128/76   20 131/81   20 123/70    Pulse Readings from Last 3 Encounters:   20 84   20 72   20 75      Resp Readings from Last 3 Encounters:   20 16   20 16   20 16    SpO2 Readings from Last 3 Encounters:   20 96%   18 98%      Temp Readings from Last 1 Encounters:   20 37  C (98.6  F) (Oral)    Ht Readings from Last 1 Encounters:   20 1.676 m (5' 6\")      Wt Readings from Last 1 Encounters:   20 74.8 kg (165 lb)    Estimated body mass index is 26.63 kg/m  as calculated from the following:    Height as of an earlier encounter on 20: 1.676 m (5' 6\").    Weight as of an earlier encounter on 20: 74.8 kg (165 lb).     LDA:  Peripheral IV 20 Left Lower forearm (Active)   Site Assessment WDL 20 1214   Line Status Infusing 20 1214   Phlebitis Scale 0-->no symptoms 20 1214   Infiltration Scale 0 20 1214   Number of days: 0       Intrathecal/Epidural Catheter 20 (Active)   Number of days: 0    "     Past Medical History:   Diagnosis Date     Migraines       No past surgical history on file.   No Known Allergies     Anesthesia Evaluation     .             ROS/MED HX    ENT/Pulmonary:  - neg pulmonary ROS     Neurologic:  - neg neurologic ROS     Cardiovascular:  - neg cardiovascular ROS       METS/Exercise Tolerance:     Hematologic:         Musculoskeletal:         GI/Hepatic:  - neg GI/hepatic ROS       Renal/Genitourinary:         Endo:         Psychiatric:         Infectious Disease:         Malignancy:         Other: Comment: 30 year old female,    - requesting epidural                    JZG FV AN PHYSICAL EXAM    Assessment:   ASA SCORE: 2 emergent           PONV Management: Adult Risk Factors: Female             neg OB ROS             May Huffman MD

## 2020-06-08 NOTE — PROGRESS NOTES
S: Patient is doing well. Feeling uncomfortable. Called anesthesia to bolus epidural. Cervix 10/100/-2 with bulging bag to the introitus. Discussed pushing with patient. Not answering questions due to being uncomfortable. Will allow patient some time to get bolus and then begin pushing with patient.  on the ipad.       O:  Blood pressure 138/76, pulse 58, temperature 98.5  F (36.9  C), temperature source Oral, resp. rate 16, last menstrual period 2019, SpO2 99 %, not currently breastfeeding.  General appearance: uncomfortable with contractions    CONTACTIONS: Contractions every 1-3 minutes.  Palpate: moderate  FETAL HEART TONES: baseline 130 with moderate FHR variability and    accelerations yes. Decelerations no.    NST: REACTIVE  ROM: clear fluid  PELVIC EXAM: PELVIC EXAM: 10/ 100%/ Mid/ soft/ -1   Fetal Position: cephalic   Bloody show: Yes   Pitocin- 4 mu/min.,  Antibiotics- none  Cervical ripening: N/A    ASSESSMENT:  ==============  IUP @ 40w0d second stage labor   Fetal Heart rate tracing category one  GBS- negative     PLAN:  ===========  Pain medication with anesthesia   Anticipate   Being pushing      SUSAN Kahn CNM

## 2020-06-08 NOTE — PROGRESS NOTES
S: Patient would like to chat about pain medication. We discussed fentanyl and epidural. Answered all her questions about both and what to expect from both.  was present over the ipad during interview to insure patient understanding of the instructions and counseling. After the discussion she would like an epidural. Due to her circumcision her vaginal exams are extremely uncomfortable. She declined a vaginal exam prior to her epidural, this is reasonable. Will check her cervix once she is comfortable and has rested a bit.   We also discussed cytotec and pitocin. She is narayan too much for more cytotec. We discussed using pitocin to help continue her labor progress if needed. She is narayan well on her own now so discussed she may not need it but most likely at some point we will start it. We also discussed breaking her water if we are able to feel any bulging bags. She is ruptured so we discussed this may not be possible. She reports understanding of plan.   Anesthesia called for epidural and available.     O:  Blood pressure 128/76, pulse 84, temperature 98.6  F (37  C), temperature source Oral, resp. rate 16, last menstrual period 2019, not currently breastfeeding.  General appearance: uncomfortable with contractions    CONTACTIONS: Contractions every 3-4 minutes.  Palpate: moderate  FETAL HEART TONES: baseline 130 with moderate FHR variability and    accelerations yes. Decelerations no.    NST: REACTIVE  ROM: clear fluid  PELVIC EXAM: deferred  Fetal Position: cephalic  Bloody show: No  Pitocin- none,  Antibiotics- none  Cervical ripening: N/A    ASSESSMENT:  ==============  IUP @ 40w0d early labor after induction for PROM  Fetal Heart rate tracing category one  GBS- negative     PLAN:  ===========  Pain medication with epidural by anesthesia   Anticipate   Labor induction with Pitocin as indicated and per protocol   Check cervix once comfortable   Reevaluate in 2-4 hours/PRN      Donna Saunders, SUSAN BEDOYAM

## 2020-06-08 NOTE — PROGRESS NOTES
S: Patient is doing well this morning. Appears tired, only rested 2 hours last night. Discussed plan for today to continue cytotec orally. 4th dose given at 0906.   Had a long discussion with patient about her circumcision and need for anterior episiotomy as well as potentially posterior episiotomy but would assess based on baby's decent. Discussed closing the circumcision post delivery. Patient requested it be put back to how it is now and closed again. Discussed with MD on call who will be present to assist with closure post delivery. Patient has no further questions about this.   Otherwise is doing well. Getting uncomfortable from contractions which are coming more frequently. Will need to hold 11 am dose of cytotec at this time. No other questions or concerns.     O:  Blood pressure 127/79, pulse 75, temperature 97.7  F (36.5  C), temperature source Oral, resp. rate 16, last menstrual period 2019, not currently breastfeeding.  General appearance: uncomfortable with contractions but coping well  CONTACTIONS: Contractions every 3-4 minutes.  Palpate: moderate  FETAL HEART TONES: baseline 130 with moderate FHR variability and    accelerations yes. Decelerations no.    NST: REACTIVE  ROM: clear fluid  PELVIC EXAM: deferred  Fetal Position: cephalic   Bloody show: No  Pitocin- none,  Antibiotics- none  Cervical ripening: misoprostol    ASSESSMENT:  ==============  IUP @ 40w0d early labor after IOL for PROM    Fetal Heart rate tracing category one  GBS- negative     PLAN:  ===========  Comfort measures prn   Cervical ripening with misoprostol per protocol  Pain medication if patient desires   Anticipate   Reevaluate in 2-4 hours/PRN     SUSAN Kahn CNM

## 2020-06-08 NOTE — PLAN OF CARE
Patient arrived to labor and delivery for admission due to SROM.  Patient seen on unit earlier today with confirmed rupture of membranes.  Patient states contractions are q7-10 minutes.  Notes positive fetal movement.  Leaking blood tinged, clear fluid.  Monitors applied.  VSS.  Provider notified.

## 2020-06-08 NOTE — PROVIDER NOTIFICATION
06/08/20 0350   Provider Notification   Provider Name/Title YASMANY Cornelius CHRISTINE   Method of Notification In Department   Request Evaluate - Remote   Notification Reason Decels;Labor Status     Reviewed strip in department with CNM and discussed late decelerations. FHR has returned to BL with moderate variability, so will continue to monitor. Also when educating patient through , it was discussed the misoprostil would be administered q4hrs, but with oral it can be given q2hrs. Pt trying to rest at this time, but provider requested another dose be given. RN agreed to plan.

## 2020-06-08 NOTE — PROVIDER NOTIFICATION
06/08/20 1449   Provider Notification   Provider Name/Title KIRA Flores CNM   Method of Notification Electronic Page   BPs remain greater than 140 sbp.  Pt is comfortable and is ready for her sve.

## 2020-06-08 NOTE — PROVIDER NOTIFICATION
06/08/20 0606   Provider Notification   Provider Name/Title YASMANY Cornelius CHRISTINE   Method of Notification Phone   Request Evaluate - Remote   Notification Reason Labor Status;Decels;Variability Change     Discussed with provider via phone plan to give another miso now. Also discussed period of minimal variability with late decelerations. FHR has returned to baseline with moderate variability, so no concerns at this time. Continue to monitor and proceed with plan for cervical ripening.

## 2020-06-08 NOTE — PLAN OF CARE
VSS, afebrile. Plan for patient is augmentation of labor after SROM. Discussed with patient last night starting with misoprostil PO to assist with cervical dilation, also discussed pain options once labor gets more intense through . Pt agrees to plan. After initial dose, pt up walking in room and leaning on bed during contractions. At about 0300 pt wished to rest, and was able to sleep until 0600. Plan to continue with PO miso until able to start pitocin or spontaneous labor. FHR WDL with two periods of minimal variability/decels. See flowsheets. Continue to monitor and let provider know of any fetal or maternal concerns. Plan for vaginal delivery.

## 2020-06-09 PROBLEM — R14.1 FLATULENCE, ERUCTATION AND GAS PAIN: Status: ACTIVE | Noted: 2020-06-09

## 2020-06-09 PROBLEM — R14.3 FLATULENCE, ERUCTATION AND GAS PAIN: Status: ACTIVE | Noted: 2020-06-09

## 2020-06-09 PROBLEM — Z36.89 ENCOUNTER FOR TRIAGE IN PREGNANT PATIENT: Status: RESOLVED | Noted: 2020-06-07 | Resolved: 2020-06-09

## 2020-06-09 PROBLEM — R14.2 FLATULENCE, ERUCTATION AND GAS PAIN: Status: ACTIVE | Noted: 2020-06-09

## 2020-06-09 LAB
GLUCOSE BLDC GLUCOMTR-MCNC: 103 MG/DL (ref 70–99)
HGB BLD-MCNC: 9.6 G/DL (ref 11.7–15.7)

## 2020-06-09 PROCEDURE — 36415 COLL VENOUS BLD VENIPUNCTURE: CPT | Performed by: ADVANCED PRACTICE MIDWIFE

## 2020-06-09 PROCEDURE — 25000132 ZZH RX MED GY IP 250 OP 250 PS 637: Performed by: ADVANCED PRACTICE MIDWIFE

## 2020-06-09 PROCEDURE — 00000146 ZZHCL STATISTIC GLUCOSE BY METER IP

## 2020-06-09 PROCEDURE — 12000001 ZZH R&B MED SURG/OB UMMC

## 2020-06-09 PROCEDURE — 85018 HEMOGLOBIN: CPT | Performed by: ADVANCED PRACTICE MIDWIFE

## 2020-06-09 RX ORDER — PRENATAL VIT/IRON FUM/FOLIC AC 27MG-0.8MG
1 TABLET ORAL DAILY
Qty: 90 TABLET | Refills: 1 | Status: SHIPPED | OUTPATIENT
Start: 2020-06-09 | End: 2024-01-19

## 2020-06-09 RX ORDER — SIMETHICONE 80 MG
80 TABLET,CHEWABLE ORAL EVERY 6 HOURS PRN
Qty: 60 TABLET | Refills: 1 | Status: SHIPPED | OUTPATIENT
Start: 2020-06-09 | End: 2020-07-08

## 2020-06-09 RX ORDER — AMOXICILLIN 250 MG
1 CAPSULE ORAL 2 TIMES DAILY
Qty: 100 TABLET | Refills: 0 | Status: SHIPPED | OUTPATIENT
Start: 2020-06-09 | End: 2020-07-08

## 2020-06-09 RX ORDER — IBUPROFEN 800 MG/1
800 TABLET, FILM COATED ORAL EVERY 6 HOURS PRN
Qty: 100 TABLET | Refills: 0 | Status: SHIPPED | OUTPATIENT
Start: 2020-06-09 | End: 2021-06-15

## 2020-06-09 RX ORDER — ACETAMINOPHEN 325 MG/1
325-650 TABLET ORAL EVERY 6 HOURS PRN
Qty: 100 TABLET | Refills: 0 | Status: ON HOLD | OUTPATIENT
Start: 2020-06-09 | End: 2024-08-16

## 2020-06-09 RX ORDER — SIMETHICONE 80 MG
80 TABLET,CHEWABLE ORAL 4 TIMES DAILY
Status: DISCONTINUED | OUTPATIENT
Start: 2020-06-09 | End: 2020-06-10 | Stop reason: HOSPADM

## 2020-06-09 RX ADMIN — IBUPROFEN 800 MG: 800 TABLET, FILM COATED ORAL at 17:59

## 2020-06-09 RX ADMIN — ACETAMINOPHEN 650 MG: 325 TABLET, FILM COATED ORAL at 01:23

## 2020-06-09 RX ADMIN — IBUPROFEN 800 MG: 800 TABLET, FILM COATED ORAL at 11:45

## 2020-06-09 RX ADMIN — DOCUSATE SODIUM 50 MG AND SENNOSIDES 8.6 MG 2 TABLET: 8.6; 5 TABLET, FILM COATED ORAL at 09:41

## 2020-06-09 RX ADMIN — ACETAMINOPHEN 650 MG: 325 TABLET, FILM COATED ORAL at 09:42

## 2020-06-09 RX ADMIN — DOCUSATE SODIUM 50 MG AND SENNOSIDES 8.6 MG 2 TABLET: 8.6; 5 TABLET, FILM COATED ORAL at 20:55

## 2020-06-09 RX ADMIN — SIMETHICONE 80 MG: 80 TABLET, CHEWABLE ORAL at 20:56

## 2020-06-09 RX ADMIN — BACITRACIN ZINC, NEOMYCIN, POLYMYXIN B: 400; 3.5; 5 OINTMENT TOPICAL at 18:02

## 2020-06-09 RX ADMIN — SIMETHICONE 80 MG: 80 TABLET, CHEWABLE ORAL at 11:44

## 2020-06-09 RX ADMIN — IBUPROFEN 800 MG: 800 TABLET, FILM COATED ORAL at 05:10

## 2020-06-09 RX ADMIN — BACITRACIN ZINC, NEOMYCIN, POLYMYXIN B: 400; 3.5; 5 OINTMENT TOPICAL at 22:17

## 2020-06-09 RX ADMIN — ACETAMINOPHEN 650 MG: 325 TABLET, FILM COATED ORAL at 14:09

## 2020-06-09 NOTE — PLAN OF CARE
Data: Heike Musa transferred to [facility] via wheelchair at 2345. Baby transferred via parent's arms.  Action: Receiving unit notified of transfer: Yes. Patient and family notified of room change. Report given to SHEREE Keller by Kim OSHEA RN. Belongings sent to receiving unit. Accompanied by Registered Nurse. Oriented patient to surroundings. Call light within reach. ID bands double-checked with receiving RN.  Response: Patient tolerated transfer and is stable.

## 2020-06-09 NOTE — PLAN OF CARE
Pt fundus firm U/U. . Pitocin infusing. Epidural turned off and removed from her back. B. VSS, afebrile. Ibuprofen given. Ice on. Pt was straight cathed after delivery, but will get up to void before transferring to [facility]. Report given to SHEREE Jon.

## 2020-06-09 NOTE — L&D DELIVERY NOTE
OB Vaginal Delivery Note  Patient arrived on  after PROM at 5 am with clear fluid. She declined IOL and went home. She returned later that evening and IOL was started  at 1 am. She labored well after cytotec and pitocin. She was given an epidural for pain management. She started to feel her contractions after good relief and cervical check revealed 10/100/-2 with bulging bag to the perineum. She requested an epidural bolus which she received. She started to push as she was still not comfortable with bolus. She pushed well. Anterior episiotomy done secondary to type 3 female circumcision. Due to low fetal heart tones a medium episiotomy was done also. She pushed well over the next two contractions after episiotomy to delivery of baby boy. Baby was placed on mother's abdomen with spontaneous respiration. NICU was called to delivery due to meconium. NICU evaluated baby during delayed cord clamping and they request cord to be clamped and cut and took baby to warmer. Baby was able to return to mother shortly. See their note for details. Dr. Harris requested for repair, both episiotomies extended, anterior over scar tissue and posterior to partial 3rd degree. See Dr. Harris's note for details. Both mother and baby stable and bonding.     Heike Musa MRN# [id number]   Age: 30 year old YOB: 1990       GA: 40w0d  GP:   Labor Complications: None   EBL:   mL  Delivery QBL:    Delivery Type: Vaginal, Spontaneous   ROM to Delivery Time: (Delivered) Days: 1 Hours: 15 Minutes: 33   Weight:     1 Minute 5 Minute 10 Minute   Apgar Totals: 8   9        LARS GUERRA;TIO LIMON OLGA     Delivery Details:  Heike Musa, a 30 year old  female delivered a viable infant with apgars of 8  and 9 . Patient was fully dilated and pushing after   hours   minutes in active labor. Delivery was via vaginal, spontaneous  to a sterile field under epidural  anesthesia. Infant delivered in vertex  right   occiput  anterior  position. Anterior and posterior shoulders delivered without difficulty. The cord was clamped, cut twice and 3 vessels  were noted. Cord blood was obtained in routine fashion with the following disposition: lab .      Cord complications: none   Placenta delivered at 2020  8:44 PM . Placental disposition was Hospital disposal . Fundal massage performed and fundus found to be firm.     Episiotomy: anterior episiotomy ;median    Perineum, vagina, cervix were inspected, and the following lacerations were noted:   Perineal lacerations:                 Any lacerations were repaired in the usual fashion using 0, 3-0, and 4-0.    Excellent hemostasis was noted. Needle count correct. Infant and patient in delivery room in good and stable condition.        Labor Event Times    Dilation complete date:  20 Complete time:   6:25 PM   Start pushing date/time:  2020 1909      Labor Length    2nd Stage (hrs):  2 (min):  8   3rd Stage (hrs):  0 (min):  11      Labor Events     labor?:  No   steroids:  None  Predominate monitoring during 1st stage:  continuous electronic fetal monitoring     Antibiotics received during labor?:  No     Rupture identifier:  Sac 1  Rupture date/time: 20 0500   Rupture type:  Premature Rupture of Membranes  Fluid color:  Clear  Fluid odor:  Normal     Delivery/Placenta Date and Time    Delivery Date:  20 Delivery Time:   8:33 PM   Placenta Date/Time:  2020  8:44 PM  Oxytocin given at the time of delivery:  after delivery of baby     Vaginal Counts     Initial count performed by 2 team members:   Two Team Members   CHRISTINE Reed, RN       Wilder S[employer]ong[employer]   Initial counts 2  5    Added to count  3 5    Final counts       Placed during labor Accounted for at the end of labor           Apgars    Living status:  Living   1 Minute 5 Minute 10 Minute 15 Minute 20 Minute   Skin color: 0  1       Heart rate: 2  2        Reflex irritability: 2  2       Muscle tone: 2  2       Respiratory effort: 2  2       Total: 8  9       Apgars assigned by:  BLADIMIR DAVIS,CNP     Cord    Vessels:  3 Vessels Complications:  None   Cord Blood Disposition:  Lab Gases Sent?:  No      [place] Resuscitation    Methods:  Suctioning   Care at Delivery:  NNP Delivery Note    Asked by Donna Morales CNM to attend the delivery of this term, male infant with a gestational age of 40 0/7 weeks secondary to meconium stained fluid.      Infant delivered at 2033 hours on 2020. Infant had spontaneous respirations at birth. He was placed on mothers abdomen, dried, stimulated, and bulb suctioned at birth. After 2 minutes of delayed cord clamping he was brought to the warmer and delee suctioned for a small amount of clear fluid.  Apgars were 8 at one minute and 9 at five minutes of age. Gross PE is WNL except for molding of the head. Infant given back to mother to hold and will be transferred to the [facility] for further care.    Bladimir Josih RN, [facility]  2020  8:40 PM  Output in Delivery Room:  Stool     Skin to Skin and Feeding Plan    Skin to skin initiation date/time: 1841    Skin to skin with:  Mother  Skin to skin end date/time:     How do you plan to feed your baby:  Breastfeeding     Labor Events and Shoulder Dystocia    Fetal Tracing Prior to Delivery:  Category 2  Shoulder dystocia present?:  Neg     Delivery (Maternal) (Provider to Complete) ([id number])    Episiotomy:  Anterior Episiotomy , Median  Indications for episiotomy:  Female Genital Cutting     Blood Loss  Mother: Heike Musa #[id number]   Start of Mother's Information    IO Blood Loss  20 0833 - 20 2152    None           End of Mother's Information  Mother: Leona Musao #[id number]         Delivery - Provider to Complete ([id number])    Delivering clinician:  Donna Saunders APRN CNM  CNM Care:  Exclusive CNM care in labor  Attempted Delivery Types (Choose all that  apply):  Spontaneous Vaginal Delivery  Delivery Type (Choose the 1 that will go to the Birth History):  Vaginal, Spontaneous   Other personnel:   Provider Role   Lucía Medina, RN Nurse Practitioner   Donna Saunders APRN CNM          Placenta    Delayed Cord Clamping:  Done  Date/Time:  6/8/2020  8:44 PM  Removal:  Spontaneous  Disposition:  Hospital disposal     Anesthesia    Method:  Epidural          Presentation and Position    Presentation:  Vertex  Position:  Right Occiput Anterior           SUSAN Kahn CNM

## 2020-06-09 NOTE — PLAN OF CARE
VSS. Fundus firm, midline at U/U. Lochia rubra and light with slight trickle with massage but stops. Perineum intact with stitches and appears to be healing well - ice pack, doe bottle and tucks pad in use. Voiding spontaneously, passing gas. Ambulating with minimal assist. Tolerating regular diet. Fasting blood sugar this AM was 103 - pt last ate food at midnight. Pain controlled with tylenol and ibuprofen. Breastfeeding with full assist. Pt appears a bit awkward with positioning, prefers cradle hold. Attempted to teach hand expression but pt unable to tolerate. Educated on nipple confusion in regards to bottle and pt elected to continue formula via bottle after attemtping breastfeeding for 30 minutes. Mom wants to try breastfeeding again once infant ready and not spitty. Lactation consult released. Bonding well with infant, mother at bedside and supportive. Continue current plan of care.     Sb  via Ipad used for all interactions and education.

## 2020-06-09 NOTE — PROGRESS NOTES
Patient arrived to [facility] unit via wheelchair at 2345,with belongings, accompanied by family, with infant in arms. Received report from SHEREE Alvarez and checked bands. Unit and room orientation completd. Call light given and within arms reach; no concerns present at this time. Continue with plan of care. Ipad ralali  used.

## 2020-06-09 NOTE — PLAN OF CARE
VSS. Fundus firm, midline at U/U. Lochia rubra and light with slight trickle with massage but stops. Perineum intact with stitches and appears to be healing well - ice pack, doe bottle and tucks pad in use. Voiding spontaneously, passing gas, patient reports a lot of gas; simethicone given. Ambulating with minimal assist. Patient reports right leg feels heavy, improving. Tolerating regular diet. Pain controlled with tylenol and ibuprofen. Breastfeeding with full assist. Bonding well with infant, skin-to-skin. mother at bedside and supportive. Continue current plan of care.

## 2020-06-09 NOTE — PROGRESS NOTES
Post Partum Note    Heike Musa      MRN#: [id number]  Age: 30 year old      YOB: 1990      Post-partum day #1  SUBJECTIVE:   Doing well.  No perineum pain.  Gastric distension and L leg feels heavy and needs assist to walk.          SIGNIFICANT PROBLEMS:  Patient Active Problem List    Diagnosis Date Noted     Flatulence, eructation and gas pain 2020     Priority: Medium     Encounter for triage in pregnant patient 2020     Priority: Medium     Labor and delivery, indication for care 2020     Priority: Medium     Diet controlled gestational diabetes mellitus (GDM) in second trimester 2020     Priority: Medium     1 hour GCT 20  143,   20 3 HOUR :  88/(199)/(216)/(171)  Abnormal 3 out of 4.  20 Diabetic ed.       Encounter for supervision of normal first pregnancy in second trimester 2020     Priority: Medium     20 FRWC U/S;  20 weeks, PAL 20, nl fetal survey, placenta posterior and low lying repeat in 6-8 weeks  3/9/20 MFM U/S; 37 0/7weeks posterior placenta no longer low lying, male, 49%tile growth, 2 lbs 3 oz every 4 week growths due to GDM  20 Growth:  Cephalic fetus.  EFW 6# 9oz, 2966 gm, 74th growth percentile.  MVP 6.5.            Need for Tdap vaccination 12/10/2019     Priority: Medium     declined       Immunization counseling 2019     Priority: Medium     Immune to Hep A, hep B, and measles       Vertiginous migraine 2018     Priority: Medium     Cervicalgia 2018     Priority: Medium       INTERVAL HISTORY:  /85   Pulse 108   Temp 98.6  F (37  C) (Oral)   Resp 16   LMP 2019 (Exact Date)   SpO2 100%   Breastfeeding Unknown     Pt stable, baby is rooming in  Breast feeding status:latching difficulties due to sleepy baby at 12 hr of age.  Complications since 2 hours post delivery: None  Patient is is not tolerating activity well, patient complains of heavy L leg post epidural.  Nl reflexes and  no foot drop on exam.  Good strenght.  Able to bear wt with assist to bathroom.  Voiding without difficulty, cramping is relieved by Ibuprophen, lochia is decreasing and patient denies clots.  Perineal pain is is minimal.  The perineum laceration is well approximated    Normal postpartum exam, Anterior and medial episiotomy and repair,  GDM diet control resolving with BS at 103.      Postpartum hemoglobin   Hemoglobin   Date Value Ref Range Status   06/09/2020 9.6 (L) 11.7 - 15.7 g/dL Final     Blood type   Lab Results   Component Value Date    ABO O 06/07/2020       Lab Results   Component Value Date    RH Pos 06/07/2020     Rubella status No results found for: RUBELLAABIGG    ASSESSMENT/PLAN:  Stable Post-partum day #1  Complications:hx of gestational diabetes, plan for 2 hour OGTT at 6 wks postpartum and anterior and medial episiotomy with repair.  Repair of 3 degree laceration extension partial (3a)  Plan d/c home tomorrow  RTC 6 weeks  Teaching done: D/C Instructions: Nutrition/Activity, Engorgement Management, Birth Control Options, Warning Signs/When to Call: Excessive Bleeding, Infection, PP Depression, Kegals and Crunches, RTC Clinic for PP Appointment and PNV    Postpartum warning s/s reviewed, including bleeding/clots, fever, mastitis, or depression    Birthcontrol planned:None and Lactation amenorrhea   Review at 6 wks  Current Discharge Medication List      CONTINUE these medications which have NOT CHANGED    Details   acetone urine (KETOSTIX) test strip 1 strip daily Use one strip daily for 1 week then weekly if negative.  Qty: 50 each, Refills: 4    Associated Diagnoses: Gestational diabetes      blood glucose (ACCU-CHEK GUIDE) test strip Use to test blood sugar 4 times daily or as directed.  Qty: 150 strip, Refills: 4    Associated Diagnoses: Gestational diabetes      blood glucose monitoring (ACCU-CHEK FASTCLIX) lancets Use to test blood sugar 4 times daily or as directed.  Ok to substitute  alternative if insurance prefers.  Qty: 102 each, Refills: 4    Associated Diagnoses: Gestational diabetes      Blood Glucose Monitoring Suppl (ACCU-CHEK GUIDE) w/Device KIT 1 kit 4 times daily  Qty: 1 kit, Refills: 1    Comments: Do not dispense unless alternate brand covered.  Patient given sample: SN: [id number], LOT: [id number]. Lia: 4/5/2020.  Associated Diagnoses: Gestational diabetes      cholecalciferol (VITAMIN D3) 5000 units (125 mcg) capsule Take 1 capsule (5,000 Units) by mouth daily  Qty: 90 capsule, Refills: 3    Associated Diagnoses: Encounter for supervision of normal first pregnancy in second trimester      docusate sodium (COLACE) 100 MG capsule Take 1 capsule (100 mg) by mouth 2 times daily as needed for constipation  Qty: 30 capsule, Refills: 1    Associated Diagnoses: Constipation      order for DME Equipment being ordered: maternity support belt  Qty: 1 Device, Refills: 0    Associated Diagnoses: Encounter for supervision of normal first pregnancy in second trimester      !! Prenatal Vit-Fe Fumarate-FA (PRENATAL MULTIVITAMIN W/IRON) 27-0.8 MG tablet Take 1 tablet by mouth daily  Qty: 90 tablet, Refills: 3    Associated Diagnoses: Encounter for supervision of normal first pregnancy in second trimester      !! Prenatal Vit-Fe Fumarate-FA (PRENATAL MULTIVITAMIN W/IRON) 27-0.8 MG tablet Take 1 tablet by mouth daily  Qty: 90 tablet, Refills: 3    Associated Diagnoses: Encounter for supervision of normal first pregnancy in second trimester      sennosides (SENOKOT) 8.6 MG tablet Take 1 tablet by mouth daily as needed for constipation  Qty: 30 tablet, Refills: 1    Associated Diagnoses: Constipation      simethicone (MYLICON) 80 MG chewable tablet Take 1 tablet (80 mg) by mouth every 6 hours as needed for flatulence or cramping  Qty: 60 tablet, Refills: 1    Associated Diagnoses: Bowel trouble       !! - Potential duplicate medications found. Please discuss with provider.

## 2020-06-09 NOTE — PLAN OF CARE
of viable Male with Donna Saunders CNM in attendance. NICU and Nursery RN Lucía present. Infant with spontaneous cry to mothers abdomen, dried and stimulated. Apgars . Placenta delivered without complications, pitocin bolused, laceration, repair done by Dr. Harris. Cassidy cares provided. Mother and baby in stable condition.

## 2020-06-09 NOTE — PROCEDURES
Obstetrical Laceration Repair:    I was called to the patient's room to assist with repair of anterior and posterior episiotomies.   Patient request that anterior episiotomy be repaired to approximate pre-delivery anatomy.     On exam, posterior perineal laceration revealed partial third degree laceration (3a).  Patient had epidural in place and lacerations were infiltrated with 1% lidocaine.    Posterior and superior reinforcing stitches were placed in the anal sphincter with 0 vicryl in figure of eights.  The remaining second degree laceration was then repaired in typical fashion using 3-0 vicryl.   This was approximately 5 cm laceration.    The anterior episiotomy was then repaired with 4-0 vicryl, bringing the skin edges together where they had split. Care was taken to avoid the urethra. At the anterior apex running subcuticular stitches were used for about 2 cm. There was then a gap of normal mucosa. Then about 1 cm of lacerated mucosa bilaterally where a band had been taken down by episiotomy.  These areas were reaproximated per patient request.     There was a split in the posterior right labial tissue, about 1 cm deep centrally, 3 cm in length. This was closed with 3-0 vicryl, figure of eight.     The patient tolerated the procedure well.   A Progressive Care  was available throughout the procedure via ipad.    Michelle Harris MD

## 2020-06-10 ENCOUNTER — APPOINTMENT (OUTPATIENT)
Dept: INTERPRETER SERVICES | Facility: CLINIC | Age: 30
End: 2020-06-10
Payer: COMMERCIAL

## 2020-06-10 VITALS
OXYGEN SATURATION: 100 % | SYSTOLIC BLOOD PRESSURE: 139 MMHG | DIASTOLIC BLOOD PRESSURE: 88 MMHG | HEART RATE: 67 BPM | TEMPERATURE: 98.3 F | RESPIRATION RATE: 18 BRPM

## 2020-06-10 PROCEDURE — 25000132 ZZH RX MED GY IP 250 OP 250 PS 637: Performed by: ADVANCED PRACTICE MIDWIFE

## 2020-06-10 RX ADMIN — SIMETHICONE 80 MG: 80 TABLET, CHEWABLE ORAL at 08:40

## 2020-06-10 RX ADMIN — ACETAMINOPHEN 650 MG: 325 TABLET, FILM COATED ORAL at 14:23

## 2020-06-10 RX ADMIN — DOCUSATE SODIUM 50 MG AND SENNOSIDES 8.6 MG 2 TABLET: 8.6; 5 TABLET, FILM COATED ORAL at 08:40

## 2020-06-10 RX ADMIN — IBUPROFEN 800 MG: 800 TABLET, FILM COATED ORAL at 04:45

## 2020-06-10 RX ADMIN — ACETAMINOPHEN 650 MG: 325 TABLET, FILM COATED ORAL at 00:04

## 2020-06-10 RX ADMIN — ACETAMINOPHEN 650 MG: 325 TABLET, FILM COATED ORAL at 08:40

## 2020-06-10 NOTE — PLAN OF CARE
Data: Vital signs within normal limits. Postpartum checks within normal limits - see flow record. Patient eating and drinking normally. Patient able to empty bladder independently and is up ambulating. No apparent signs of infection. Episiotomy healing well. Patient performing self cares and is able to care for infant.  Action: Patient medicated during the shift for pain. See MAR. Patient reassessed within 1 hour after each medication and pain was improved - patient stated she was comfortable. Patient education done about pain control, doe care, hand expression, breastfeeding latch and positions. See flow record.  Response: Positive attachment behaviors observed with infant. Support persons are present.   Plan: Anticipate discharge on Wednesday.

## 2020-06-10 NOTE — LACTATION NOTE
This note was copied from a baby's chart.  Consult for: First time breastfeeding and formula supplementation.     Delivery Information: Infant was born at 40.0 weeks via vaginal delivery on 20 at 2033.    Maternal Health History: Heike has a history of GDM, migraines and uterine fibroids. She noted breast growth in early pregnancy. Her breasts are soft and symmetrical with bilateral smoother, reddened nipples. ?    Infant information: Infant appears to keep his head turned to the right side during my visit and appeared uncomfortable despite position changes when trying to latch to the right breast. ?    Oral exam of baby:  Infant with short lingual frenulum.     Feeding Assessment:  Per Heike, infant is difficult to latch to the right breast. She shares that infant comes off the breast frequently. She is choosing to supplement with formula via bottle.    Together we attempted to latch infant to the right breast. He was arching back and pushing away from the breast and unable to sustain latch. Several different positions attempted but infant unable to sustain latch on right breast and became fussy. Mother declined to attempt latch with a nipple shield.   Infant was transferred to the left breast. He easily latched an appeared comfortable at the breast. He came off the breast x 1. Heike reluctant to re-latch and after a few minutes requested formula for infant.    Heike has been pumping her right breast for stimulation.       Education: benefits of exclusive breastfeeding,  early feeding cues, benefits of feeding on cue, breastfeeding positions, signs breastfeeding is going well (comfortable latch, age appropriate output and weight loss, swallowing heard at the breast), satiety cues, expected  output,  weight loss, nutritive vs non-nutritive sucking, benefits of skin to skin, the Second Night, benefits of breast massage and hand expression of colostrum, Infant Feeding Log handout, inpatient  lactation support and outpatient lactation resources.        Plan: Continue breastfeeding on cue with RN support as needed with a goal of 8-12 feedings per day. Encourage frequent skin to skin, breast massage and hand expression. If continuing formula supplementation, continue to breastfeed first and supplement with small volumes after breastfeeding.     If infant unable to latch or sustain latch on right breast, mother should continue to hand express/pump for breast stimulation.    Concern for tight frenulum and possible torticollis, but not noted in Peds note. Mother was encouraged to follow up with an outpatient LC if breastfeeding not improving over the next few days at home. She plans to [facility] up at [facility]. She was oriented to lactation support available at [facility].

## 2020-06-10 NOTE — PLAN OF CARE
Patient has met discharge goals, vss. Patient acknowledged discharge instructions. Patient took birth certificate home to fill out. Will bring back.  Discharge medications given to patient. Patient is now discharged, awaiting for ride.

## 2020-06-10 NOTE — PLAN OF CARE
VSS. Fundus firm, midline at U/1. Lochia rubra and light. Perineum healing well - ice pack, witch hazel pads, doe bottle and ointment used. Cramping pain controlled with tylenol and ibuprofen. Voiding spontaneously, passing gas. Tolerating regular diet. Up ad artur. Breastfeeding with assistance; encouraged to breastfeed without offering formula as infant is starting to be interested in nursing. Mom getting better at latching on left breast but difficulty on right breast; Lactation consult ordered. Patient attempted football hold; cradle position was successful this morning on left side. Patient was encouraged to breastfeed exclusively to stimulate good milk supply and help baby to feed well. Bonding well with infant. Continue current plan of care.

## 2020-06-10 NOTE — DISCHARGE INSTRUCTIONS
Vaginal Delivery Discharge Instructions: [facility]  Waxq[place]ka:     Waydiiso qoyska iyo saaxibadaa inay ku caawiyaan markaad u baahantahay.    Waxba mensah kor saarin ama mensah galin farjigaaga ilaa uu dhakhtarkaagu ansixiyo.    Si fudud u qaado dhowrka asbuuc e xiga si aad u ogalaato in jirkaagu mello kabto. Waxaad samayn kartaa howl kasta oo aad rabto ilaa meeshaas laga gaarayo.    Gaadhi mensah wadin markaad qaadanayso  kaniiniyada xanuunka ee dhkhatarku kuu qoray . waxaad gaadhi wadi kartaa haddii aad qaadanayso kaniiniyada xanuunka ee koontarka.    Wac bixiyahaaga daryeelka caafimaaad haddii aad qabtid mid ka mid ah calaamadahan mello socda:    Haddii suufka dhiigga nuuga uu ku buuxsamo 1 saac gudihiis, ama aad aragto xinjiro dhiig ah oo ka wayn kubadda golafka.     Dhiig soconaya wax kabadan 6 asbuuc.    Haddii aad qabto dheecaan farjiga ka imaanaya oo  si xun u uraya.     [facility] 100.4  F (38  C) am aka sii saraysa (heerkulka laga qaaday carabka hoostiiisa), oo qarqaryo leh ama aan lahayn     Xanuun, nabar, majiirid daran oo aad ka dareeto qaybta hoose ee ubucda.    Xanuun sii kordya, barar, guduudasho ama dheecaan ka dhiimaya meesha la tolay ee qaliinka.    Kaadi badan oo dagdag ah oo markasta ku qabanaysa , ama gubasho aad dareento markaad kaajayso.    Guduudasho, barar, ama xanuun aad ka dareento xididada lugta.    Dhibaato kaa haysata naas nuujinta, ama guduudasho ama xanuun naaska ah.    Xanuun sii kordhaya ama aan ka dhamaanayn meesha la tolay ama danqashada dilaaca.    Lalabbo ama matag.    Xabad xanuun iyo qufac ama naqaska oo kujalil chandler.    Dhibaato ay la socoto murugfide, porfirio, aa janny.     Haddii aad qabtid wax janny rahman oo ku saabsolga inaad waxyeelayso naftaada ama ilmaha, wac yao kelseyrolando jackiimartha.     Haddii aad qabto bashir aalo milady de los santos noqoto kadib.    Gacmahaagga nadiifi:  Markasta dhaqa gacahaaga ka hor inta aadan taaban farjiga agagaarkiisa  iyo meesha la tolay. Bennie faith  caawiniaysaa titi murrayado infakshanku. Hdii gacmahaagu fabián knott gacmaha [facility] daniels u nadiifiso gacmahaaga. Jourdaniariel nadiifi ooo jar.      Vaginal Delivery Discharge Instructions  Activity:     Ask family and friends for help when you need it.    Do not place anything in your vagina until your doctor approves.    Take it easy for the next few weeks to allow your body to recover. You may do any activities you feel up to at that point.    Do not drive while taking pain pills prescribed by your doctor. You may drive if taking over-the-counter pain pills.    Call your health care provider if you have any of these symptoms:    You soak a sanitary pad with blood within 1 hour, or you see blood clots larger than a golf ball.    Bleeding that lasts more than 6 weeks.    You have vaginal discharge that smells bad.     A fever of 100.4  F (38  C) or higher (temperature taken under your tongue), with or without chills     Severe, pain, cramping or tenderness in your lower belly area.    Increased pain, swelling, redness or fluid around your stitches.    A more frequent or urgent need to urinate (pee), or it burns when you pee.    Redness, swelling or pain around a vein in your leg.    Problems breastfeeding, or a red or painful area on your breast.    Pain that increases or does not go away from an episiotomy or perineal tear.    Nausea and vomiting.    Chest pain and cough or are gasping for air.    Problems coping with sadness, anxiety, or depression.     If you have any concerns about hurting yourself or the baby, call your doctor right away.      You have questions or concerns after you return home.    Keep your hands clean:  Always wash your hands before touching your perineal area and stitches.  This helps reduce your risk of infection.  If your hands aren t dirty, you may use an alcohol hand-rub to clean your hands. Keep your nails clean and short.

## 2020-06-10 NOTE — PROGRESS NOTES
Post Partum Note    Heike Musa      MRN#: [id number]  Age: 30 year old      YOB: 1990      Post-partum day #2    SIGNIFICANT PROBLEMS:  Patient Active Problem List    Diagnosis Date Noted     Flatulence, eructation and gas pain 2020     Priority: Medium      (normal spontaneous vaginal delivery) 2020     Priority: Medium     Anterior and medial episiotomy with extension to 3 degree partial 3a       Labor and delivery, indication for care 2020     Priority: Medium     Diet controlled gestational diabetes mellitus (GDM) in second trimester 2020     Priority: Medium     1 hour GCT 20  143,   20 3 HOUR :  88/(199)/(216)/(171)  Abnormal 3 out of 4.  20 Diabetic ed.       Encounter for supervision of normal first pregnancy in second trimester 2020     Priority: Medium     20 FRWC U/S;  20 weeks, PAL 20, nl fetal survey, placenta posterior and low lying repeat in 6-8 weeks  3/9/20 MFM U/S; 37 0/7weeks posterior placenta no longer low lying, male, 49%tile growth, 2 lbs 3 oz every 4 week growths due to GDM  20 Growth:  Cephalic fetus.  EFW 6# 9oz, 2966 gm, 74th growth percentile.  MVP 6.5.            Need for Tdap vaccination 12/10/2019     Priority: Medium     declined       Immunization counseling 2019     Priority: Medium     Immune to Hep A, hep B, and measles       Vertiginous migraine 2018     Priority: Medium     Cervicalgia 2018     Priority: Medium       INTERVAL HISTORY:  /79   Pulse 77   Temp 98.6  F (37  C) (Oral)   Resp 16   LMP 2019 (Exact Date)   SpO2 100%   Breastfeeding Unknown     Pt stable, baby is rooming in  Breast feeding status:latching difficulties on one breast  Complications since 2 hours post delivery: None  Patient is tolerating acitivity well Voiding without difficulty, cramping is relieved by Ibuprophen, lochia is decreasing and patient denies clots.  Perineal pain is is  minimal and is relieved by Ibuprophen.  The perineum laceration is well approximated    Normal postpartum exam, MLE and anterior epis with 3 degree extension repair.    Postpartum hemoglobin   Hemoglobin   Date Value Ref Range Status   2020 9.6 (L) 11.7 - 15.7 g/dL Final     Blood type   Lab Results   Component Value Date    ABO O 2020       Lab Results   Component Value Date    RH Pos 2020     Rubella status No results found for: RUBELLAABIGG    ASSESSMENT/PLAN:  Stable Post-partum day #2  Complications:breastfeeding difficulties  Plan d/c home today  RTC 6 weeks  Teaching done: D/C Instructions: Nutrition/Activity, Engorgement Management, Birth Control Options, Warning Signs/When to Call: Excessive Bleeding, Infection, PP Depression, Kegals and Crunches, RTC Clinic for PP Appointment and PNV    Postpartum warning s/s reviewed, including bleeding/clots, fever, mastitis, or depression    Birthcontrol planned:None and Lactation amenorrhea  Current Discharge Medication List      START taking these medications    Details   acetaminophen (TYLENOL) 325 MG tablet Take 1-2 tablets (325-650 mg) by mouth every 6 hours as needed for mild pain or pain  Qty: 100 tablet, Refills: 0    Associated Diagnoses:  (normal spontaneous vaginal delivery)      ibuprofen (ADVIL/MOTRIN) 800 MG tablet Take 1 tablet (800 mg) by mouth every 6 hours as needed for other (cramping)  Qty: 100 tablet, Refills: 0    Associated Diagnoses:  (normal spontaneous vaginal delivery)      senna-docusate (SENOKOT-S/PERICOLACE) 8.6-50 MG tablet Take 1 tablet by mouth 2 times daily  Qty: 100 tablet, Refills: 0    Associated Diagnoses: Flatulence, eructation and gas pain         CONTINUE these medications which have CHANGED    Details   Prenatal Vit-Fe Fumarate-FA (PRENATAL MULTIVITAMIN W/IRON) 27-0.8 MG tablet Take 1 tablet by mouth daily  Qty: 90 tablet, Refills: 1    Associated Diagnoses: Encounter for supervision of normal first  pregnancy in second trimester      simethicone (MYLICON) 80 MG chewable tablet Take 1 tablet (80 mg) by mouth every 6 hours as needed for flatulence or cramping  Qty: 60 tablet, Refills: 1    Associated Diagnoses: Bowel trouble         CONTINUE these medications which have NOT CHANGED    Details   acetone urine (KETOSTIX) test strip 1 strip daily Use one strip daily for 1 week then weekly if negative.  Qty: 50 each, Refills: 4    Associated Diagnoses: Gestational diabetes      blood glucose (ACCU-CHEK GUIDE) test strip Use to test blood sugar 4 times daily or as directed.  Qty: 150 strip, Refills: 4    Associated Diagnoses: Gestational diabetes      blood glucose monitoring (ACCU-CHEK FASTCLIX) lancets Use to test blood sugar 4 times daily or as directed.  Ok to substitute alternative if insurance prefers.  Qty: 102 each, Refills: 4    Associated Diagnoses: Gestational diabetes      Blood Glucose Monitoring Suppl (ACCU-CHEK GUIDE) w/Device KIT 1 kit 4 times daily  Qty: 1 kit, Refills: 1    Comments: Do not dispense unless alternate brand covered.  Patient given sample: SN: [id number], LOT: [id number]. Lia: 4/5/2020.  Associated Diagnoses: Gestational diabetes      cholecalciferol (VITAMIN D3) 5000 units (125 mcg) capsule Take 1 capsule (5,000 Units) by mouth daily  Qty: 90 capsule, Refills: 3    Associated Diagnoses: Encounter for supervision of normal first pregnancy in second trimester      docusate sodium (COLACE) 100 MG capsule Take 1 capsule (100 mg) by mouth 2 times daily as needed for constipation  Qty: 30 capsule, Refills: 1    Associated Diagnoses: Constipation      order for DME Equipment being ordered: maternity support belt  Qty: 1 Device, Refills: 0    Associated Diagnoses: Encounter for supervision of normal first pregnancy in second trimester      sennosides (SENOKOT) 8.6 MG tablet Take 1 tablet by mouth daily as needed for constipation  Qty: 30 tablet, Refills: 1    Associated Diagnoses: Constipation

## 2020-06-10 NOTE — PLAN OF CARE
VSS. Fundus firm, midline at U/1. Lochia rubra and scant. Perineum healing well - ice pack, witch hazel pads and ointment used. Educated pt on how often to use ointment. Cramping pain controlled with tylenol and ibuprofen. Voiding spontaneously, passing gas. Tolerating regular diet. Up ad arutr. Formula and Breastfeeding with assistance. Mom getting better at latching on left breast but difficulty on right breast. Resistant to new positions like football hold or cross cradle hold. States they make her head and neck hurt. Gives up easily when infant not latching well. Writer continuing to encourage and educating on importance of breastfeeding to stimulate milk supply. Unable to tolerate hand expression and states she has sore nipples - lanolin given. Lactation consult in place. Bonding well with infant. Continue current plan of care.

## 2020-06-12 ENCOUNTER — APPOINTMENT (OUTPATIENT)
Dept: INTERPRETER SERVICES | Facility: CLINIC | Age: 30
End: 2020-06-12
Payer: COMMERCIAL

## 2020-06-14 ENCOUNTER — HOSPITAL ENCOUNTER (EMERGENCY)
Facility: CLINIC | Age: 30
Discharge: HOME | End: 2020-06-14
Attending: EMERGENCY MEDICINE | Admitting: EMERGENCY MEDICINE
Payer: COMMERCIAL

## 2020-06-14 VITALS
TEMPERATURE: 98.2 F | OXYGEN SATURATION: 100 % | HEART RATE: 68 BPM | WEIGHT: 159 LBS | SYSTOLIC BLOOD PRESSURE: 149 MMHG | BODY MASS INDEX: 25.66 KG/M2 | DIASTOLIC BLOOD PRESSURE: 88 MMHG | RESPIRATION RATE: 16 BRPM

## 2020-06-14 DIAGNOSIS — G89.18 EPISIOTOMY PAIN: ICD-10-CM

## 2020-06-14 PROCEDURE — 99282 EMERGENCY DEPT VISIT SF MDM: CPT

## 2020-06-14 PROCEDURE — 99282 EMERGENCY DEPT VISIT SF MDM: CPT | Mod: Z6 | Performed by: EMERGENCY MEDICINE

## 2020-06-14 ASSESSMENT — ENCOUNTER SYMPTOMS
NECK STIFFNESS: 0
HEADACHES: 0
ABDOMINAL PAIN: 0
DIFFICULTY URINATING: 0
ARTHRALGIAS: 0
COLOR CHANGE: 0
FEVER: 0
CONFUSION: 0
SHORTNESS OF BREATH: 0
EYE REDNESS: 0

## 2020-06-14 NOTE — ED PROVIDER NOTES
"    [facility] EMERGENCY DEPARTMENT ([facility])    20      History     Chief Complaint   Patient presents with     Postpartum Complications     Pt had a baby on Monday. She is having increased pain in her vagina     The history is provided by the patient and medical records. The history is limited by a language barrier. A  was used (Official [employer] ).     Heike Musa is a 30 year old female with a past medical history of gestational diabetes who presents to the Emergency Department for evaluation of vaginal pain.  Per chart review, the patient underwent  on 2020 and is currently .  Patient states that she did experience some vaginal tearing during her delivery, which did require repair.  She states this is the area where she feels the most discomfort.  She states that laying down, and trying to use the toilet makes this pain worse.  She states that she does have slight vaginal bleeding, but states that this has been consistent since her discharge.  Patient states the pain is a \"pinching, burning\" sensation.  Patient has been using ibuprofen and Tylenol as prescribed every 6 hours.    I have reviewed the Medications, Allergies, Past Medical and Surgical History, and Social History in the [employer] system.  PAST MEDICAL HISTORY:   Past Medical History:   Diagnosis Date     Migraines        PAST SURGICAL HISTORY: History reviewed. No pertinent surgical history.    Past medical history, past surgical history, medications, and allergies were reviewed with the patient. Additional pertinent items: None    FAMILY HISTORY:   Family History   Problem Relation Age of Onset     Hypertension Mother        SOCIAL HISTORY:   Social History     Tobacco Use     Smoking status: Never Smoker     Smokeless tobacco: Never Used   Substance Use Topics     Alcohol use: No     Social history was reviewed with the patient. Additional pertinent items: None      Patient's Medications   New " Prescriptions    No medications on file   Previous Medications    ACETAMINOPHEN (TYLENOL) 325 MG TABLET    Take 1-2 tablets (325-650 mg) by mouth every 6 hours as needed for mild pain or pain    ACETONE URINE (KETOSTIX) TEST STRIP    1 strip daily Use one strip daily for 1 week then weekly if negative.    BLOOD GLUCOSE (ACCU-CHEK GUIDE) TEST STRIP    Use to test blood sugar 4 times daily or as directed.    BLOOD GLUCOSE MONITORING (ACCU-CHEK FASTCLIX) LANCETS    Use to test blood sugar 4 times daily or as directed.  Ok to substitute alternative if insurance prefers.    BLOOD GLUCOSE MONITORING SUPPL (ACCU-CHEK GUIDE) W/DEVICE KIT    1 kit 4 times daily    CHOLECALCIFEROL (VITAMIN D3) 5000 UNITS (125 MCG) CAPSULE    Take 1 capsule (5,000 Units) by mouth daily    DOCUSATE SODIUM (COLACE) 100 MG CAPSULE    Take 1 capsule (100 mg) by mouth 2 times daily as needed for constipation    IBUPROFEN (ADVIL/MOTRIN) 800 MG TABLET    Take 1 tablet (800 mg) by mouth every 6 hours as needed for other (cramping)    ORDER FOR DME    Equipment being ordered: maternity support belt    PRENATAL VIT-FE FUMARATE-FA (PRENATAL MULTIVITAMIN W/IRON) 27-0.8 MG TABLET    Take 1 tablet by mouth daily    SENNA-DOCUSATE (SENOKOT-S/PERICOLACE) 8.6-50 MG TABLET    Take 1 tablet by mouth 2 times daily    SENNOSIDES (SENOKOT) 8.6 MG TABLET    Take 1 tablet by mouth daily as needed for constipation    SIMETHICONE (MYLICON) 80 MG CHEWABLE TABLET    Take 1 tablet (80 mg) by mouth every 6 hours as needed for flatulence or cramping   Modified Medications    No medications on file   Discontinued Medications    No medications on file        No Known Allergies     Review of Systems   Constitutional: Negative for fever.   HENT: Negative for congestion.    Eyes: Negative for redness.   Respiratory: Negative for shortness of breath.    Cardiovascular: Negative for chest pain.   Gastrointestinal: Negative for abdominal pain.   Genitourinary: Positive for vaginal  bleeding and vaginal pain. Negative for difficulty urinating.   Musculoskeletal: Negative for arthralgias and neck stiffness.   Skin: Negative for color change.   Neurological: Negative for headaches.   Psychiatric/Behavioral: Negative for confusion.   All other systems reviewed and are negative.    A complete review of systems was performed with pertinent positives and negatives noted in the HPI, and all other systems negative.    Physical Exam   BP: (!) 146/80  Heart Rate: 73  Temp: 98.2  F (36.8  C)  Resp: 14  Weight: 72.1 kg (159 lb)  SpO2: 97 %      Physical Exam  Constitutional:       General: She is not in acute distress.     Appearance: She is not diaphoretic.   HENT:      Head: Atraumatic.      Mouth/Throat:      Pharynx: No oropharyngeal exudate.   Eyes:      General: No scleral icterus.     Pupils: Pupils are equal, round, and reactive to light.   Cardiovascular:      Heart sounds: Normal heart sounds.   Pulmonary:      Effort: No respiratory distress.      Breath sounds: Normal breath sounds.   Abdominal:      General: Bowel sounds are normal.      Palpations: Abdomen is soft.      Tenderness: There is no abdominal tenderness.   Genitourinary:     Labia:         Right: No rash or tenderness.         Left: No rash or tenderness.       Urethra: No urethral pain or urethral lesion.      Vagina: Signs of injury present. No vaginal discharge, erythema, tenderness, bleeding or prolapsed vaginal walls.       Musculoskeletal:         General: No tenderness.   Skin:     General: Skin is warm.      Findings: No rash.         ED Course   1:24 PM  The patient was seen and examined by Albert Watkins MD in Room ED02.        Procedures             No results found for this or any previous visit (from the past 24 hour(s)).  Medications - No data to display          Assessments & Plan (with Medical Decision Making)   30-year-old female presents 5 days after delivery with persistent vaginal pain status post episiotomy x2  during birth secondary to female circumcision.  Differential includes abscess, dehiscence, cellulitis.  The episiotomy repair appears intact and there is no evidence of obvious abscess or cellulitis.  I recommended use of Tylenol, ibuprofen, sitz bath's and follow-up with primary care provider in 2 days.    I have reviewed the nursing notes.    I have reviewed the findings, diagnosis, plan and need for follow up with the patient.    New Prescriptions    No medications on file       Final diagnoses:   Episiotomy pain   I, Michael Valenzuela, am serving as a trained medical scribe to document services personally performed by Albert Watkins MD, based on the provider's statements to me.      I, Albert Watkins MD, was physically present and have reviewed and verified the accuracy of this note documented by Michael Valenzuela.     6/14/2020   [facility], [place], EMERGENCY DEPARTMENT     Alonzo Watkins MD  06/14/20 [id number]

## 2020-06-14 NOTE — ED AVS SNAPSHOT
[facility], [place], Emergency Department  [address]  [facility] [address]  Phone:  [phone]  Fax:  [phone]                                    Heike Musa   MRN: [id number]    Department:  [facility], Emergency Department   Date of Visit:  6/14/2020           After Visit Summary Signature Page    I have received my discharge instructions, and my questions have been answered. I have discussed any challenges I see with this plan with the nurse or doctor.    ..........................................................................................................................................  Patient/Patient Representative Signature      ..........................................................................................................................................  Patient Representative Print Name and Relationship to Patient    ..................................................               ................................................  Date                                   Time    ..........................................................................................................................................  Reviewed by Signature/Title    ...................................................              ..............................................  Date                                               Time          22EPIC Rev 08/18

## 2020-06-22 ENCOUNTER — PATIENT OUTREACH (OUTPATIENT)
Dept: CARE COORDINATION | Facility: CLINIC | Age: 30
End: 2020-06-22

## 2020-06-22 NOTE — PROGRESS NOTES
Clinic Care Coordination Contact  [facility]/Voicemail       Clinical Data: Care Coordinator Outreach  Outreach attempted x 1.  Left message on patient's voicemail with [employer]  with call back information and requested return call.  Plan: Care Coordinator will try to reach patient again in 10 business days.

## 2020-07-08 ENCOUNTER — OFFICE VISIT (OUTPATIENT)
Dept: MIDWIFE SERVICES | Facility: CLINIC | Age: 30
End: 2020-07-08
Payer: COMMERCIAL

## 2020-07-08 VITALS
DIASTOLIC BLOOD PRESSURE: 78 MMHG | HEART RATE: 86 BPM | TEMPERATURE: 96.7 F | OXYGEN SATURATION: 99 % | SYSTOLIC BLOOD PRESSURE: 120 MMHG | HEIGHT: 64 IN | BODY MASS INDEX: 25.39 KG/M2 | WEIGHT: 148.7 LBS

## 2020-07-08 DIAGNOSIS — K64.4 EXTERNAL HEMORRHOIDS: ICD-10-CM

## 2020-07-08 DIAGNOSIS — N89.8 VAGINAL ITCHING: ICD-10-CM

## 2020-07-08 LAB
SPECIMEN SOURCE: NORMAL
WET PREP SPEC: NORMAL

## 2020-07-08 PROCEDURE — 87210 SMEAR WET MOUNT SALINE/INK: CPT | Performed by: ADVANCED PRACTICE MIDWIFE

## 2020-07-08 PROCEDURE — 99024 POSTOP FOLLOW-UP VISIT: CPT | Performed by: ADVANCED PRACTICE MIDWIFE

## 2020-07-08 RX ORDER — MINERAL OIL, PETROLATUM, PHENYLEPHRINE HCL 2.5; 140; 749 MG/G; MG/G; MG/G
OINTMENT TOPICAL 2 TIMES DAILY PRN
Qty: 28 G | Refills: 1 | Status: SHIPPED | OUTPATIENT
Start: 2020-07-08

## 2020-07-08 ASSESSMENT — MIFFLIN-ST. JEOR: SCORE: 1374.5

## 2020-07-08 NOTE — PROGRESS NOTES
"S: Heike is here, seen with help from [facility]  by phone, with complaints of ongoing pain in the area of her laceration repair from her  on 20. 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 Both episiotomies extended, and the perineal laceration extended to 3rd degree and was repaired by Dr. Harris. The anterior laceration is the more painful now. Causes her burning and pain when she sits to breastfeed. She denies fever, abdominal pain, foul smelling lochia. Her postpartum lochia stopped about 1 week ago. She feels burning \"on the outside\" when she urinates. Feels that her posterior laceration has healed well. She is still breastfeeding and it is going well. Feels that her mood is good, no concerns.    O:/78   Pulse 86   Temp 96.7  F (35.9  C) (Oral)   Ht 1.626 m (5' 4\")   Wt 67.4 kg (148 lb 11.2 oz)   LMP 2019   SpO2 99%   BMI 25.52 kg/m    Exam:  Constitutional: healthy, alert and no distress  Gastrointestinal: Abdomen soft, non-tender. BS normal. No masses, organomegaly  Psychiatric: mentation appears normal and affect normal/bright  Pelvic Exam:  Vulva: laceration edges well approximated, sutures intact. No drainage, swelling, redness   Vagina: Moist, pink, no abnormal discharge, well rugated, no lesions, laceration well approximated  Rectal exam: 3 small, soft external hemorrhoids noted   Wet prep: negative    A/P:  (O70.9) Perineal laceration during delivery, postpartum condition  (primary encounter diagnosis)  Plan: benzocaine-menthol (DERMOPLAST) 20-0.5 % AERO    (N89.8) Vaginal itching  Plan: Wet prep    (K64.4) External hemorrhoids  Plan: phenylephrine-shark liver oil-mineral         oil-petrolatum (PREPARATION H) 0.25-14-74.9 %         rectal ointment    Discussed that itching is normal as the sutures heal. Discussed that it may take 6-8 weeks to completely heal, joan the anterior " portion as it consists of scar tissue from her type 3 female circumcision. Encouraged at least 2 sitz baths/day to help encourage healing. Dermoplast spray to help with pain, Prep-H for hemorrhoids. Encouraged her to come back at 6w postpartum for routine check, or sooner for further concerns.    David Sheehan CNM

## 2020-07-14 ENCOUNTER — PATIENT OUTREACH (OUTPATIENT)
Dept: CARE COORDINATION | Facility: CLINIC | Age: 30
End: 2020-07-14

## 2020-07-14 NOTE — PROGRESS NOTES
Clinic Care Coordination Contact  [facility]/Voicemail       Clinical Data: Care Coordinator Outreach  Outreach attempted x 1.  Left message on patient's voicemail with call back information and requested return call.  Plan: Care Coordinator will try to reach patient again in 10 business days.

## 2020-07-15 ENCOUNTER — PATIENT OUTREACH (OUTPATIENT)
Dept: CARE COORDINATION | Facility: CLINIC | Age: 30
End: 2020-07-15

## 2020-07-15 NOTE — PROGRESS NOTES
Clinic Care Coordination Contact    Situation: Patient chart reviewed by care coordinator.    Background: RN CC's initial assessment and enrollment to Care Coordination was 4/14/2020.   Patient centered goals were developed with participation from patient.   CC handed patient off to CHW for continued outreach every 30 days.     Assessment: CHW has been in contact with patient monthly. Patient has made progress to goals.  Pt was recently in the hospital. Pt's spouse talked with hospital staff after discharge and he stated she was doing well.       Plan/Recommendations: CHW will involve SW CC as needed or if patient is ready to move to maintenance.     SW CC will continue to monitor progress to goals and CHW outreaches every 6 weeks.    DYLAN Morris  Clinic Care Coordinator   [facility]  [phone]

## 2020-07-24 ENCOUNTER — VIRTUAL VISIT (OUTPATIENT)
Dept: ENDOCRINOLOGY | Facility: CLINIC | Age: 30
End: 2020-07-24

## 2020-07-24 ENCOUNTER — APPOINTMENT (OUTPATIENT)
Dept: INTERPRETER SERVICES | Facility: CLINIC | Age: 30
End: 2020-07-24
Payer: COMMERCIAL

## 2020-07-24 DIAGNOSIS — E04.1 THYROID NODULE: Primary | ICD-10-CM

## 2020-07-24 NOTE — PROGRESS NOTES
"Jeni Aguila is a 30 year old female who is being evaluated via a billable telephone visit.      The patient has been notified of following:     \"This telephone visit will be conducted via a call between you and your physician/provider. We have found that certain health care needs can be provided without the need for a physical exam.  This service lets us provide the care you need with a short phone conversation.  If a prescription is necessary we can send it directly to your pharmacy.  If lab work is needed we can place an order for that and you can then stop by our lab to have the test done at a later time.    Telephone visits are billed at different rates depending on your insurance coverage. During this emergency period, for some insurers they may be billed the same as an in-person visit.  Please reach out to your insurance provider with any questions.    If during the course of the call the physician/provider feels a telephone visit is not appropriate, you will not be charged for this service.\"    Patient has given verbal consent for Telephone visit?  Yes    What phone number would you like to be contacted at? [phone]    How would you like to obtain your AVS? Mail a copy    Telephone called started at 12:11 using "[employer] , ended 1235, total time 25 minutes    Jeni is a 30 year old female presents today follow up of thyroid nodule.    HPI  #1 Multiple thyroid nodule with dominant right sided nodule with possible microcalcifications diagnosed in 2018, AUS on FNA and suspicious features on the repeat AUS with FNA on genetic testing  Jeni bland was diagnosed with thyroid nodule on right side last year when she was 3 month pregnancy. Nodule was detected on exam.     FNA was delayed due to pregnancy. US showed intermediate suspicion nodule.  FNA AUS in Jan 2019, Repeat FNA showed AUS in July 2019 and also genetic testing was \"suspicious.\" Letter sent to pt to talk about the results and the pt didn't " respond.    Interval hx: Pt denies any change in the thyroid nodule - no associated symptoms. Pt reports concern about pain/enlargement of the lymph nodes.  Of note, the patient reports that she never received the letter.    #2 Abnormal TFTs -- suppressed TSH  TSH is normal at 0.84 in Feb 2019.     Past Medical History  No past medical history on file.    Allergies  No Known Allergies  Medications  Current Outpatient Medications   Medication Sig Dispense Refill     Prenatal Vit-Fe Fumarate-FA (PRENATAL MULTIVITAMIN PLUS IRON) 27-0.8 MG TABS per tablet Take 1 tablet by mouth daily       Family History  family history is not on file.  Social History  Social History     Socioeconomic History     Marital status:      Spouse name: Not on file     Number of children: Not on file     Years of education: Not on file     Highest education level: Not on file   Occupational History     Not on file   Social Needs     Financial resource strain: Not on file     Food insecurity     Worry: Not on file     Inability: Not on file     Transportation needs     Medical: Not on file     Non-medical: Not on file   Tobacco Use     Smoking status: Never Smoker     Smokeless tobacco: Never Used   Substance and Sexual Activity     Alcohol use: Not on file     Drug use: Not on file     Sexual activity: Not on file   Lifestyle     Physical activity     Days per week: Not on file     Minutes per session: Not on file     Stress: Not on file   Relationships     Social connections     Talks on phone: Not on file     Gets together: Not on file     Attends Evangelical service: Not on file     Active member of club or organization: Not on file     Attends meetings of clubs or organizations: Not on file     Relationship status: Not on file     Intimate partner violence     Fear of current or ex partner: Not on file     Emotionally abused: Not on file     Physically abused: Not on file     Forced sexual activity: Not on file   Other Topics Concern      Not on file   Social History Narrative     Not on file       ROS  General: no change in weight  CV: no complaints  Pulmonary: No complaints  GI: No complaints  : No complaints  MsK: No complaints  Infection: Pt denies  Skin: Pt denies rash  Heme: Pt denies  Neuro: Pt denies      Physical Exam  There were no vitals taken for this visit.  There is no height or weight on file to calculate BMI.    Exam:  Vital signs and physical exam not available.  However the patient reports feeling well. Psych: Alert and oriented times 3; coherent speech, normal  rate and volume, able to articulate logical thoughts, able to abstract reason, no tangential thoughts, no hallucinations or delusions      RESULTS  No visits with results within 1 Week(s) from this visit.   Latest known visit with results is:   Orders Only on 07/24/2019   Component Date Value Ref Range Status     Lab Scanned Result 07/24/2019 AFIRMA THYROID FNA-Scanned   Final       ASSESSMENT:    #1 Multiple thyroid nodule with dominant right sided nodule with possible microcalcifications diagnosed in 2018, AUS on FNA and suspicious features on the repeat AUS with FNA on genetic testing  Extensive discussion with patient.  Patient is quite concerned about her suspicious thyroid nodule and reports that she never heard about this.  I discussed with the patient that the next step is to perform a neck ultrasound (given her concern about lymph node enlargement) and then have her see the surgeon for consideration of resection.  We will also repeat her thyroid ultrasound given that her previous ultrasound was about 1 year ago.    We will recheck TFTs, basic metabolic panel, and hCG. ENT referral placed    #2 Abnormal TFTs -- suppressed TSH  We will recheck TFTs.    #3  Concern about swollen lymph nodes  We will also check her lymph nodes using a neck soft tissue ultrasound.    Orders Placed This Encounter   Procedures     US head neck soft tissue     US Thyroid     Basic  metabolic panel     TSH     T4 free     T3 total     CBC with platelets     HCG qualitative, Blood ([id number])     OTOLARYNGOLOGY REFERRAL     Telephone called started at 12:11 using [employer] , ended 2948, total time 25 minutes    Per patient request, we will get aforementioned testing done as soon as possible.  We will have the patient return in roughly 2 months for follow-up, although we will be in touch with results before then.

## 2020-07-24 NOTE — LETTER
"7/24/2020       RE: Jeni Aguila  [address] [facility] [address]     Dear Colleague,    Thank you for referring your patient, Jeni Aguila, to the [facility] ENDOCRINOLOGY at [facility]. Please see a copy of my visit note below.    Jeni Aguila is a 30 year old female who is being evaluated via a billable telephone visit.      The patient has been notified of following:     \"This telephone visit will be conducted via a call between you and your physician/provider. We have found that certain health care needs can be provided without the need for a physical exam.  This service lets us provide the care you need with a short phone conversation.  If a prescription is necessary we can send it directly to your pharmacy.  If lab work is needed we can place an order for that and you can then stop by our lab to have the test done at a later time.    Telephone visits are billed at different rates depending on your insurance coverage. During this emergency period, for some insurers they may be billed the same as an in-person visit.  Please reach out to your insurance provider with any questions.    If during the course of the call the physician/provider feels a telephone visit is not appropriate, you will not be charged for this service.\"    Patient has given verbal consent for Telephone visit?  Yes    What phone number would you like to be contacted at? [phone]    How would you like to obtain your AVS? Mail a copy    Telephone called started at 12:11 using [employer] , ended 1235, total time 25 minutes    Jeni bland a 30 year old female presents today follow up of thyroid nodule.    HPI  #1 Multiple thyroid nodule with dominant right sided nodule with possible microcalcifications diagnosed in 2018, AUS on FNA and suspicious features on the repeat AUS with FNA on genetic testing  Jeni bland was diagnosed with thyroid nodule on right side last year when she was 3 month " "pregnancy. Nodule was detected on exam.     FNA was delayed due to pregnancy. US showed intermediate suspicion nodule.  FNA AUS in Jan 2019, Repeat FNA showed AUS in July 2019 and also genetic testing was \"suspicious.\" Letter sent to pt to talk about the results and the pt didn't respond.    Interval hx: Pt denies any change in the thyroid nodule - no associated symptoms. Pt reports concern about pain/enlargement of the lymph nodes.  Of note, the patient reports that she never received the letter.    #2 Abnormal TFTs -- suppressed TSH  TSH is normal at 0.84 in Feb 2019.     Past Medical History  No past medical history on file.    Allergies  No Known Allergies  Medications  Current Outpatient Medications   Medication Sig Dispense Refill     Prenatal Vit-Fe Fumarate-FA (PRENATAL MULTIVITAMIN PLUS IRON) 27-0.8 MG TABS per tablet Take 1 tablet by mouth daily       Family History  family history is not on file.  Social History  Social History     Socioeconomic History     Marital status:      Spouse name: Not on file     Number of children: Not on file     Years of education: Not on file     Highest education level: Not on file   Occupational History     Not on file   Social Needs     Financial resource strain: Not on file     Food insecurity     Worry: Not on file     Inability: Not on file     Transportation needs     Medical: Not on file     Non-medical: Not on file   Tobacco Use     Smoking status: Never Smoker     Smokeless tobacco: Never Used   Substance and Sexual Activity     Alcohol use: Not on file     Drug use: Not on file     Sexual activity: Not on file   Lifestyle     Physical activity     Days per week: Not on file     Minutes per session: Not on file     Stress: Not on file   Relationships     Social connections     Talks on phone: Not on file     Gets together: Not on file     Attends Restorationist service: Not on file     Active member of club or organization: Not on file     Attends meetings of " clubs or organizations: Not on file     Relationship status: Not on file     Intimate partner violence     Fear of current or ex partner: Not on file     Emotionally abused: Not on file     Physically abused: Not on file     Forced sexual activity: Not on file   Other Topics Concern     Not on file   Social History Narrative     Not on file       ROS  General: no change in weight  CV: no complaints  Pulmonary: No complaints  GI: No complaints  : No complaints  MsK: No complaints  Infection: Pt denies  Skin: Pt denies rash  Heme: Pt denies  Neuro: Pt denies      Physical Exam  There were no vitals taken for this visit.  There is no height or weight on file to calculate BMI.    Exam:  Vital signs and physical exam not available.  However the patient reports feeling well. Psych: Alert and oriented times 3; coherent speech, normal  rate and volume, able to articulate logical thoughts, able to abstract reason, no tangential thoughts, no hallucinations or delusions      RESULTS  No visits with results within 1 Week(s) from this visit.   Latest known visit with results is:   Orders Only on 07/24/2019   Component Date Value Ref Range Status     Lab Scanned Result 07/24/2019 AFIRMA THYROID FNA-Scanned   Final       ASSESSMENT:    #1 Multiple thyroid nodule with dominant right sided nodule with possible microcalcifications diagnosed in 2018, AUS on FNA and suspicious features on the repeat AUS with FNA on genetic testing  Extensive discussion with patient.  Patient is quite concerned about her suspicious thyroid nodule and reports that she never heard about this.  I discussed with the patient that the next step is to perform a neck ultrasound (given her concern about lymph node enlargement) and then have her see the surgeon for consideration of resection.  We will also repeat her thyroid ultrasound given that her previous ultrasound was about 1 year ago.    We will recheck TFTs, basic metabolic panel, and hCG. ENT referral  placed    #2 Abnormal TFTs -- suppressed TSH  We will recheck TFTs.    #3  Concern about swollen lymph nodes  We will also check her lymph nodes using a neck soft tissue ultrasound.    Orders Placed This Encounter   Procedures     US head neck soft tissue     US Thyroid     Basic metabolic panel     TSH     T4 free     T3 total     CBC with platelets     HCG qualitative, Blood ([id number])     OTOLARYNGOLOGY REFERRAL     Telephone called started at 12:11 using [employer] , ended 1239, total time 25 minutes    Per patient request, we will get aforementioned testing done as soon as possible.  We will have the patient return in roughly 2 months for follow-up, although we will be in touch with results before then.    Again, thank you for allowing me to participate in the care of your patient.      Sincerely,    Alycia Hudson MD

## 2020-07-28 ENCOUNTER — APPOINTMENT (OUTPATIENT)
Dept: INTERPRETER SERVICES | Facility: CLINIC | Age: 30
End: 2020-07-28
Payer: COMMERCIAL

## 2020-07-28 ENCOUNTER — PRE VISIT (OUTPATIENT)
Dept: OTOLARYNGOLOGY | Facility: CLINIC | Age: 30
End: 2020-07-28

## 2020-07-28 ENCOUNTER — VIRTUAL VISIT (OUTPATIENT)
Dept: OTOLARYNGOLOGY | Facility: CLINIC | Age: 30
End: 2020-07-28
Attending: INTERNAL MEDICINE

## 2020-07-28 VITALS — HEIGHT: 67 IN | BODY MASS INDEX: 19.93 KG/M2 | WEIGHT: 127 LBS

## 2020-07-28 DIAGNOSIS — E04.1 THYROID NODULE: Primary | ICD-10-CM

## 2020-07-28 RX ORDER — AMOXICILLIN 500 MG/1
500 TABLET, FILM COATED ORAL 2 TIMES DAILY
COMMUNITY

## 2020-07-28 ASSESSMENT — MIFFLIN-ST. JEOR: SCORE: 1323.7

## 2020-07-28 NOTE — LETTER
7/28/2020       RE: Jeni Aguila  [address] [facility] [address]     Dear Colleague,    Thank you for referring your patient, Jeni gAuila, to the [facility] EAR NOSE AND THROAT at [facility]. Please see a copy of my visit note below.    Jeni Aguila is a 30 year old female who is being evaluated via a billable telephone visit.          Phone call duration: 0 minutes    Tried calling twice with  services. No answer either time. Left message    Gabriela Nesbitt MD

## 2020-07-28 NOTE — PROGRESS NOTES
"Jeni Aguila is a 30 year old female who is being evaluated via a billable telephone visit.      The patient has been notified of following:     \"This telephone visit will be conducted via a call between you and your physician/provider. We have found that certain health care needs can be provided without the need for a physical exam.  This service lets us provide the care you need with a short phone conversation.  If a prescription is necessary we can send it directly to your pharmacy.  If lab work is needed we can place an order for that and you can then stop by our lab to have the test done at a later time.    Telephone visits are billed at different rates depending on your insurance coverage. During this emergency period, for some insurers they may be billed the same as an in-person visit.  Please reach out to your insurance provider with any questions.    If during the course of the call the physician/provider feels a telephone visit is not appropriate, you will not be charged for this service.\"    Patient has given verbal consent for Telephone visit?  Yes    What phone number would you like to be contacted at? [phone]    How would you like to obtain your AVS? MyChart     Phone call duration: 0 minutes    Tried calling twice with  services. No answer either time. Left message    Gabriela Nesbitt MD      "

## 2020-08-05 ENCOUNTER — TELEPHONE (OUTPATIENT)
Dept: ENDOCRINOLOGY | Facility: CLINIC | Age: 30
End: 2020-08-05

## 2020-08-05 NOTE — TELEPHONE ENCOUNTER
Pt had appt with ENT but did not respond to the call.     ----- Message from Alycia Hudson MD sent at 7/24/2020 12:37 PM CDT -----  Pt get eval done sooner?

## 2020-08-05 NOTE — LETTER
Patient:  Jeni Aguila  :   1990  MRN:     [id number]        Ms.Fadumo NICOLA Aguila  [address] [facility] [address]        2020    Dear Jeni    It looks like you were set up with the ENT appointment on  but you missed this appointment.  This is very important for follow-up of your thyroid nodule.  Please call to arrange this appointment.    If you have any questions, please feel free to contact my nurse at [phone] select option #3 for triage nurse.     Regards    Alycia Hudson MD

## 2020-08-06 ENCOUNTER — APPOINTMENT (OUTPATIENT)
Dept: INTERPRETER SERVICES | Facility: CLINIC | Age: 30
End: 2020-08-06
Payer: COMMERCIAL

## 2020-08-07 ENCOUNTER — PATIENT OUTREACH (OUTPATIENT)
Dept: CARE COORDINATION | Facility: CLINIC | Age: 30
End: 2020-08-07

## 2020-08-07 NOTE — PROGRESS NOTES
Clinic Care Coordination Contact  Patient has completed all goals with Clinic Care Coordination.  Please review the chart and confirm if maintenance  is approved.    Spoke briefly with pt today with Sb  on the line. Pt said she is doing well, just trying to recover and rest. Pt said she is tired but baby is doing well. I asked if pt was still interested in support around unemployment insurance and financial resources, and she said no. Pt said she did not feel like speaking more about it now. I told pt if she changes her mind to please call me and let me know, as we are happy to support. Pt said she would. Thanked me for the follow up.

## 2020-08-10 ENCOUNTER — PATIENT OUTREACH (OUTPATIENT)
Dept: CARE COORDINATION | Facility: CLINIC | Age: 30
End: 2020-08-10

## 2020-08-10 NOTE — PROGRESS NOTES
Clinic Care Coordination Contact    Situation: Patient chart reviewed by care coordinator.    Background: Pt completed a CC assessment on 4/17/2020. Pt centered goals were made.     Assessment: Pt has worked with CHW on completing her goals. Pt goals were completed and pt states she does not feel that she is in need of anything at this time.    Plan/Recommendations: SW reviewed chart and pt has been moved to maintenance. CHW will call pt in 60 days and see if she is in need of anything else.     DYLAN Morris  Clinic Care Coordinator   [facility]  [phone]

## 2020-08-31 ENCOUNTER — VIRTUAL VISIT (OUTPATIENT)
Dept: OTOLARYNGOLOGY | Facility: CLINIC | Age: 30
End: 2020-08-31

## 2020-08-31 ENCOUNTER — DOCUMENTATION ONLY (OUTPATIENT)
Dept: OTOLARYNGOLOGY | Facility: CLINIC | Age: 30
End: 2020-08-31

## 2020-08-31 VITALS — WEIGHT: 119 LBS | BODY MASS INDEX: 18.68 KG/M2 | HEIGHT: 67 IN

## 2020-08-31 DIAGNOSIS — E04.1 THYROID NODULE: Primary | ICD-10-CM

## 2020-08-31 ASSESSMENT — PAIN SCALES - GENERAL: PAINLEVEL: NO PAIN (0)

## 2020-08-31 ASSESSMENT — MIFFLIN-ST. JEOR: SCORE: 1287.41

## 2020-08-31 NOTE — PROGRESS NOTES
Dr. Nesbitt advised writer to wait on any procedure related to patient currently having a diagnosis of Covid-19. She states to wait a month and have patient take 2 Covid-19 test one week apart. Writer to follow up with patient next month to proceed with covid-19 testing.      Tika Benton LPN

## 2020-08-31 NOTE — PROGRESS NOTES
"Jeni Aguila is a 30 year old female who is being evaluated via a billable telephone visit.      The patient has been notified of following:     \"This telephone visit will be conducted via a call between you and your physician/provider. We have found that certain health care needs can be provided without the need for a physical exam.  This service lets us provide the care you need with a short phone conversation.  If a prescription is necessary we can send it directly to your pharmacy.  If lab work is needed we can place an order for that and you can then stop by our lab to have the test done at a later time.    Telephone visits are billed at different rates depending on your insurance coverage. During this emergency period, for some insurers they may be billed the same as an in-person visit.  Please reach out to your insurance provider with any questions.    If during the course of the call the physician/provider feels a telephone visit is not appropriate, you will not be charged for this service.\"    Patient has given verbal consent for Telephone visit?  Yes    What phone number would you like to be contacted at?   [phone]  How would you like to obtain your AVS? MyChart    Phone call duration: 30 minutes   services was utilized during/for this telephone visit    Gabriela Nesbitt MD    I was asked to consult with Jeni for a 1.6cm right thyroid nodule. FNA was AUS x 2. There is a small 5mm left thyroid nodule. TFT's are WNL    Patient denies any problems with voice quality, swallowing or inspiration. No family hx of PTC. No previous HN XRT. No sx of hypo or hyperthyroidism.     I personally reviewed the chart including pathology and radiographic images.     I recommend a right thyroid lobectomy and isthmusectomy. I discussed the procedure and risks including, but not limited to bleeding, infection, injury to the recurrent laryngeal nerve. Patient states she will discuss with her family as to " when she would like surgery and get back to us. All questions answered.     Gabriela Nesbitt MD

## 2020-08-31 NOTE — LETTER
"8/31/2020       RE: Jeni Aguila  [address]  [facility] [address]     Dear Colleague,    Thank you for referring your patient, Jeni Aguila, to the [facility] EAR NOSE AND THROAT at [facility]. Please see a copy of my visit note below.    Jeni Aguila is a 30 year old female who is being evaluated via a billable telephone visit.      The patient has been notified of following:     \"This telephone visit will be conducted via a call between you and your physician/provider. We have found that certain health care needs can be provided without the need for a physical exam.  This service lets us provide the care you need with a short phone conversation.  If a prescription is necessary we can send it directly to your pharmacy.  If lab work is needed we can place an order for that and you can then stop by our lab to have the test done at a later time.    Telephone visits are billed at different rates depending on your insurance coverage. During this emergency period, for some insurers they may be billed the same as an in-person visit.  Please reach out to your insurance provider with any questions.    If during the course of the call the physician/provider feels a telephone visit is not appropriate, you will not be charged for this service.\"    Patient has given verbal consent for Telephone visit?  Yes    What phone number would you like to be contacted at?   [phone]  How would you like to obtain your AVS? [employer]    Phone call duration: 30 minutes   services was utilized during/for this telephone visit    Gabriela Nesbitt MD    I was asked to consult with Jeni for a 1.6cm right thyroid nodule. FNA was AUS x 2. There is a small 5mm left thyroid nodule. TFT's are WNL    Patient denies any problems with voice quality, swallowing or inspiration. No family hx of PTC. No previous HN XRT. No sx of hypo or hyperthyroidism.     I personally reviewed the chart " including pathology and radiographic images.     I recommend a right thyroid lobectomy and isthmusectomy. I discussed the procedure and risks including, but not limited to bleeding, infection, injury to the recurrent laryngeal nerve. Patient states she will discuss with her family as to when she would like surgery and get back to us. All questions answered.     Gabriela Nesbitt MD    Again, thank you for allowing me to participate in the care of your patient.      Sincerely,    Gabriela Nesbitt MD

## 2020-09-14 ENCOUNTER — PREP FOR PROCEDURE (OUTPATIENT)
Dept: OTOLARYNGOLOGY | Facility: CLINIC | Age: 30
End: 2020-09-14

## 2020-09-14 DIAGNOSIS — E04.1 THYROID NODULE: Primary | ICD-10-CM

## 2020-09-14 RX ORDER — DEXAMETHASONE SODIUM PHOSPHATE 4 MG/ML
10 INJECTION, SOLUTION INTRA-ARTICULAR; INTRALESIONAL; INTRAMUSCULAR; INTRAVENOUS; SOFT TISSUE ONCE
Status: CANCELLED | OUTPATIENT
Start: 2020-09-14 | End: 2020-09-14

## 2020-09-15 ENCOUNTER — TELEPHONE (OUTPATIENT)
Dept: OTOLARYNGOLOGY | Facility: CLINIC | Age: 30
End: 2020-09-15

## 2020-09-15 ENCOUNTER — APPOINTMENT (OUTPATIENT)
Dept: INTERPRETER SERVICES | Facility: CLINIC | Age: 30
End: 2020-09-15
Payer: COMMERCIAL

## 2020-09-15 NOTE — TELEPHONE ENCOUNTER
Talked with patient with [place]  on the line for the duration of the call.  Discussed covid19 testing prior to surgery with Dr. Nesbitt and utilizing an [facility] testing site. Patient reports that she has a closer location than the [facility]. Offered patient [facility] or [facility] locations.  Patient had inquired regarding scheduling surgery at this time as she recently found out that she is pregnant patient reports 3 weeks pregnant. Patient would like to know if she should have surgery at this time. Message sent to Dr. Nesbitt for recommendations.   Will follow up with patient as she has declined scheduling covid testing at this time.       Kathie Damon  09/15/20 1:50 PM

## 2020-09-17 ENCOUNTER — TELEPHONE (OUTPATIENT)
Dept: SURGERY | Facility: CLINIC | Age: 30
End: 2020-09-17

## 2020-09-17 ENCOUNTER — APPOINTMENT (OUTPATIENT)
Dept: INTERPRETER SERVICES | Facility: CLINIC | Age: 30
End: 2020-09-17
Payer: COMMERCIAL

## 2020-09-17 NOTE — TELEPHONE ENCOUNTER
Gabriela Nesbitt MD Johnson, Kayla               Patient does note need surgery until after delivery of her child     Madyson Nesbitt    Previous Messages     ----- Message -----   From: Kathie Damon   Sent: 9/15/2020   1:46 PM CDT   To: Gabriela Nesbitt MD     I just talked with patient, she reports that she is ~4 weeks pregnant and wants to know if it is safe to have surgery. I told her I would follow up with you.     She has declined scheduling her covid testing for right now.         Relayed this information from Dr. Nesbitt to the patient directly regarding waiting until after pregnancy to schedule surgery.     Informed patient that she should reach out directly once she has delivered and we can schedule patient at that time.   Orders were auto-cancelled due to expiration. Will need to have new orders placed in the future.       Kathie Damon  09/17/20 11:56 AM

## 2020-09-30 ENCOUNTER — TELEPHONE (OUTPATIENT)
Dept: OTOLARYNGOLOGY | Facility: CLINIC | Age: 30
End: 2020-09-30

## 2020-10-16 ENCOUNTER — PATIENT OUTREACH (OUTPATIENT)
Dept: CARE COORDINATION | Facility: CLINIC | Age: 30
End: 2020-10-16

## 2020-10-16 NOTE — PROGRESS NOTES
Clinic Care Coordination Contact  Patient has completed all goals with Clinic Care Coordination.  Please review the chart and confirm if graduation is approved.    Called and spoke with pt today with Sb . Pt said she is doing good and everything is fine. I asked if she needed anything from us at this time or had any concerns, and pt said no. Confirmed that pt has my phone number and encouraged her to reach out in the future. She said she would. Pt said she had to go and thanked me for the call.

## 2020-10-19 ENCOUNTER — PATIENT OUTREACH (OUTPATIENT)
Dept: CARE COORDINATION | Facility: CLINIC | Age: 30
End: 2020-10-19

## 2020-10-19 NOTE — PROGRESS NOTES
Clinic Care Coordination Contact    Assessment: Care Coordinator contacted patient for 2 month follow up.  Patient has continued to follow the plan of care and assessment is negative for any new needs or concerns.    Enrollment status: Graduated.      Plan: No further outreaches at this time.  Patient will continue to follow the plan of care.  If new needs arise a new Care Coordination referral may be placed.  FYI to PCP    DYLAN Morris  Clinic Care Coordinator   [facility]  [phone]

## 2020-11-04 DIAGNOSIS — Z32.01 PREGNANCY TEST POSITIVE: Primary | ICD-10-CM

## 2021-03-31 ENCOUNTER — MEDICAL CORRESPONDENCE (OUTPATIENT)
Dept: HEALTH INFORMATION MANAGEMENT | Facility: CLINIC | Age: 31
End: 2021-03-31

## 2021-03-31 ENCOUNTER — TRANSCRIBE ORDERS (OUTPATIENT)
Dept: OTHER | Age: 31
End: 2021-03-31

## 2021-03-31 ENCOUNTER — TRANSFERRED RECORDS (OUTPATIENT)
Dept: HEALTH INFORMATION MANAGEMENT | Facility: CLINIC | Age: 31
End: 2021-03-31

## 2021-03-31 DIAGNOSIS — M79.672 FOOT PAIN, BILATERAL: Primary | ICD-10-CM

## 2021-03-31 DIAGNOSIS — M79.671 FOOT PAIN, BILATERAL: Primary | ICD-10-CM

## 2021-04-01 ENCOUNTER — APPOINTMENT (OUTPATIENT)
Dept: INTERPRETER SERVICES | Facility: CLINIC | Age: 31
End: 2021-04-01
Payer: COMMERCIAL

## 2021-04-01 ENCOUNTER — TELEPHONE (OUTPATIENT)
Dept: ORTHOPEDICS | Facility: CLINIC | Age: 31
End: 2021-04-01

## 2021-04-01 ENCOUNTER — TELEPHONE (OUTPATIENT)
Dept: ENDOCRINOLOGY | Facility: CLINIC | Age: 31
End: 2021-04-01

## 2021-04-01 NOTE — TELEPHONE ENCOUNTER
LVM with  for patient to call back to schedule ultrasounds with the imaging center (ordered in July 2020) and follow up with Dr Hudson after.

## 2021-04-01 NOTE — TELEPHONE ENCOUNTER
Patient has urgent podiatry referral.  No appts available within time frame at [facility] or surrounding  clinics.  Is there any way she can be scheduled within requested time frame?

## 2021-04-02 ENCOUNTER — TELEPHONE (OUTPATIENT)
Dept: ORTHOPEDICS | Facility: CLINIC | Age: 31
End: 2021-04-02

## 2021-04-09 NOTE — TELEPHONE ENCOUNTER
RECORDS RECEIVED FROM: [facility] foot pain/Karen Mueller MD @ [facility]/HP/ortho con   DATE RECEIVED: May 5, 2021     NOTES STATUS DETAILS   OFFICE NOTE from referring provider Internal    OFFICE NOTE from other specialist N/A    DISCHARGE SUMMARY from hospital N/A    DISCHARGE REPORT from the ER N/A    OPERATIVE REPORT N/A    MEDICATION LIST Internal    IMPLANT RECORD/STICKER N/A    LABS     CBC/DIFF N/A    CULTURES N/A    INJECTIONS DONE IN RADIOLOGY N/A    MRI N/A    CT SCAN N/A    XRAYS (IMAGES & REPORTS) N/A    TUMOR     PATHOLOGY  Slides & report N/A    04/09/21   3:09 PM   COMPLETE  Lyn Hurtado, CMA

## 2021-05-04 ENCOUNTER — APPOINTMENT (OUTPATIENT)
Dept: INTERPRETER SERVICES | Facility: CLINIC | Age: 31
End: 2021-05-04
Payer: COMMERCIAL

## 2021-05-05 ENCOUNTER — PRE VISIT (OUTPATIENT)
Dept: ORTHOPEDICS | Facility: CLINIC | Age: 31
End: 2021-05-05

## 2021-05-05 ENCOUNTER — ANCILLARY PROCEDURE (OUTPATIENT)
Dept: GENERAL RADIOLOGY | Facility: CLINIC | Age: 31
End: 2021-05-05
Attending: PODIATRIST
Payer: COMMERCIAL

## 2021-05-05 ENCOUNTER — OFFICE VISIT (OUTPATIENT)
Dept: ORTHOPEDICS | Facility: CLINIC | Age: 31
End: 2021-05-05
Payer: COMMERCIAL

## 2021-05-05 DIAGNOSIS — M79.672 PAIN IN BOTH FEET: ICD-10-CM

## 2021-05-05 DIAGNOSIS — M79.671 FOOT PAIN, BILATERAL: ICD-10-CM

## 2021-05-05 DIAGNOSIS — M72.2 PLANTAR FASCIITIS: ICD-10-CM

## 2021-05-05 DIAGNOSIS — M76.71 PERONEAL TENDINITIS OF RIGHT LOWER EXTREMITY: ICD-10-CM

## 2021-05-05 DIAGNOSIS — M79.671 PAIN IN BOTH FEET: ICD-10-CM

## 2021-05-05 DIAGNOSIS — M79.672 FOOT PAIN, BILATERAL: ICD-10-CM

## 2021-05-05 DIAGNOSIS — M72.2 PLANTAR FASCIITIS: Primary | ICD-10-CM

## 2021-05-05 DIAGNOSIS — M76.822 POSTERIOR TIBIAL TENDON DYSFUNCTION (PTTD) OF LEFT LOWER EXTREMITY: ICD-10-CM

## 2021-05-05 PROCEDURE — 73650 X-RAY EXAM OF HEEL: CPT | Mod: RT | Performed by: RADIOLOGY

## 2021-05-05 PROCEDURE — 99203 OFFICE O/P NEW LOW 30 MIN: CPT | Performed by: PODIATRIST

## 2021-05-05 NOTE — LETTER
5/5/2021       RE: Heike Musa  [address]  [facility] [address]    Dear Colleague,    Thank you for referring your patient, Heike Musa, to the [facility] ORTHOPEDIC CLINIC [place]. Please see a copy of my visit note below.    Date of Service: 5/5/2021    Chief Complaint   Patient presents with     Consult     Pain, bilateral foot.           HPI: Heike is a 31 year old female who presents today for further evaluation of BL heel pain.  Nature: sharp/dull    Location: BL heels    Duration: since 2017    Onset: gradual. No inciting trauma    Course: waxes and wanes.     Aggravating/alleviating factors: walking and weight bearing aggravate. Rest alleviates. +post-static dyskinesia.    Previous Treatments: Change in shoes.   She works at [employer] on hard floors x 8 hours a shift.     Review of Systems: No n/v/d/f/c/ns/sob/cp    PMH:   Past Medical History:   Diagnosis Date     Migraines        PSxH: No past surgical history on file.    Allergies: Patient has no known allergies.    SH:   Social History     Socioeconomic History     Marital status: Single     Spouse name: Not on file     Number of children: Not on file     Years of education: Not on file     Highest education level: Not on file   Occupational History     Not on file   Social Needs     Financial resource strain: Not on file     Food insecurity     Worry: Not on file     Inability: Not on file     Transportation needs     Medical: Not on file     Non-medical: Not on file   Tobacco Use     Smoking status: Never Smoker     Smokeless tobacco: Never Used   Substance and Sexual Activity     Alcohol use: No     Drug use: No     Sexual activity: Yes     Partners: Male   Lifestyle     Physical activity     Days per week: Not on file     Minutes per session: Not on file     Stress: Not at all   Relationships     Social connections     Talks on phone: Not on file     Gets together: Not on file     Attends Sikhism service: Not on file      Active member of club or organization: Not on file     Attends meetings of clubs or organizations: Not on file     Relationship status:      Intimate partner violence     Fear of current or ex partner: No     Emotionally abused: No     Physically abused: No     Forced sexual activity: No   Other Topics Concern     Not on file   Social History Narrative     Not on file       FH:   Family History   Problem Relation Age of Onset     Hypertension Mother        Objective:  Data Unavailable Data Unavailable Data Unavailable Data Unavailable Data Unavailable 0 lbs 0 oz    PT and DP pulses are 2/4 bilaterally. CRT is instant. Positive pedal hair.   Gross sensation is intact bilaterally.   Equinus is noted bilaterally. Pain noted with lateral calc squeeze BL. Some pain noted with palpation of the medial attachments of the plantar fascias on BL calcanei. Pain with STJ ROM. Pain along the course of the right peroneal tendons and the left PT tendons. Pes planus noted BL.   Nails normal bilaterally. No open lesions are noted.     bilateral calcaneal xrays indicated in 4 weightbearing views.    No acute processes noted. Pes planus.       Assessment: BL plantar fascitis and tendonitis 2/2 severe pes planus.     Plan:  - Pt seen and evaluated.  - XRs taken and discussed with her.  - Recommend a pair of orthotics for her shoes. Molds taken and sent to the lab.   - Recommend PT. Info was given to her.   - Rx for Voltaren gel.   - See again in 2 months.            Tito Williamson DPM

## 2021-05-05 NOTE — PROGRESS NOTES
Date of Service: 5/5/2021    Chief Complaint   Patient presents with     Consult     Pain, bilateral foot.           HPI: Heike is a 31 year old female who presents today for further evaluation of BL heel pain.  Nature: sharp/dull    Location: BL heels    Duration: since 2017    Onset: gradual. No inciting trauma    Course: waxes and wanes.     Aggravating/alleviating factors: walking and weight bearing aggravate. Rest alleviates. +post-static dyskinesia.    Previous Treatments: Change in shoes.   She works at [employer] on hard floors x 8 hours a shift.     Review of Systems: No n/v/d/f/c/ns/sob/cp    PMH:   Past Medical History:   Diagnosis Date     Migraines        PSxH: No past surgical history on file.    Allergies: Patient has no known allergies.    SH:   Social History     Socioeconomic History     Marital status: Single     Spouse name: Not on file     Number of children: Not on file     Years of education: Not on file     Highest education level: Not on file   Occupational History     Not on file   Social Needs     Financial resource strain: Not on file     Food insecurity     Worry: Not on file     Inability: Not on file     Transportation needs     Medical: Not on file     Non-medical: Not on file   Tobacco Use     Smoking status: Never Smoker     Smokeless tobacco: Never Used   Substance and Sexual Activity     Alcohol use: No     Drug use: No     Sexual activity: Yes     Partners: Male   Lifestyle     Physical activity     Days per week: Not on file     Minutes per session: Not on file     Stress: Not at all   Relationships     Social connections     Talks on phone: Not on file     Gets together: Not on file     Attends Jain service: Not on file     Active member of club or organization: Not on file     Attends meetings of clubs or organizations: Not on file     Relationship status:      Intimate partner violence     Fear of current or ex partner: No     Emotionally abused: No     Physically  abused: No     Forced sexual activity: No   Other Topics Concern     Not on file   Social History Narrative     Not on file       FH:   Family History   Problem Relation Age of Onset     Hypertension Mother        Objective:  Data Unavailable Data Unavailable Data Unavailable Data Unavailable Data Unavailable 0 lbs 0 oz    PT and DP pulses are 2/4 bilaterally. CRT is instant. Positive pedal hair.   Gross sensation is intact bilaterally.   Equinus is noted bilaterally. Pain noted with lateral calc squeeze BL. Some pain noted with palpation of the medial attachments of the plantar fascias on BL calcanei. Pain with STJ ROM. Pain along the course of the right peroneal tendons and the left PT tendons. Pes planus noted BL.   Nails normal bilaterally. No open lesions are noted.     bilateral calcaneal xrays indicated in 4 weightbearing views.    No acute processes noted. Pes planus.       Assessment: BL plantar fascitis and tendonitis 2/2 severe pes planus.     Plan:  - Pt seen and evaluated.  - XRs taken and discussed with her.  - Recommend a pair of orthotics for her shoes. Molds taken and sent to the lab.   - Recommend PT. Info was given to her.   - Rx for Voltaren gel.   - See again in 2 months.

## 2021-05-06 ENCOUNTER — THERAPY VISIT (OUTPATIENT)
Dept: PHYSICAL THERAPY | Facility: CLINIC | Age: 31
End: 2021-05-06
Payer: COMMERCIAL

## 2021-05-06 DIAGNOSIS — M76.822 POSTERIOR TIBIAL TENDON DYSFUNCTION (PTTD) OF LEFT LOWER EXTREMITY: ICD-10-CM

## 2021-05-06 DIAGNOSIS — M79.672 FOOT PAIN, BILATERAL: ICD-10-CM

## 2021-05-06 DIAGNOSIS — M79.671 PAIN IN BOTH FEET: ICD-10-CM

## 2021-05-06 DIAGNOSIS — M76.71 PERONEAL TENDINITIS OF RIGHT LOWER EXTREMITY: ICD-10-CM

## 2021-05-06 DIAGNOSIS — M79.671 FOOT PAIN, BILATERAL: ICD-10-CM

## 2021-05-06 DIAGNOSIS — M79.672 PAIN IN BOTH FEET: ICD-10-CM

## 2021-05-06 DIAGNOSIS — M72.2 PLANTAR FASCIITIS: ICD-10-CM

## 2021-05-06 PROCEDURE — 97110 THERAPEUTIC EXERCISES: CPT | Mod: GP | Performed by: PHYSICAL THERAPIST

## 2021-05-06 PROCEDURE — 97161 PT EVAL LOW COMPLEX 20 MIN: CPT | Mod: GP | Performed by: PHYSICAL THERAPIST

## 2021-05-06 NOTE — PROGRESS NOTES
Physical Therapy Initial Evaluation  Subjective:  The history is provided by the patient. A  was used (interepreter).   Therapist Generated HPI Evaluation  Problem details: DOI: 2-, insidous, unknown reasons,.         Type of problem:  Bilateral feet.    This is a chronic condition.  Condition occurred with:  Insidious onset and other reason.  Where condition occurred: for unknown reasons.  Patient reports pain:  Medial (medial and lateral calcaneus).  Pain is described as cramping (bilateral) and is constant.  Pain radiates to:  No radiation. Pain timing: none.  Since onset symptoms are gradually worsening.  Associated with: none. Symptoms are exacerbated by walking and standing  and relieved by ice.  Special tests included:  X-ray.  Past treatment: none. Improved with treatment: na.  Work activity restrictions: [employer].  Barriers include:  None as reported by patient.                        Objective:  System    Ankle/Foot Evaluation  ROM:    AROM:    Dorsiflexion:  Left:   Wnl  Right:   Wnl  Plantarflexion:  Left:  Wnl    Right:  Wnl  Inversion:  Left:  Wnl     Right:  Wnl  Eversion:  Wnl     Right:  Wnl        Strength:    Dorsiflexion:  Left: 5/5     Pain:   Right: 5/5   Pain:  Plantarflexion: Left: 3+/5    Pain:+   Right: 3+/5   Pain:+  Inversion:Left: 5-/5  Pain:     Right: 5-/5  Pain:  Eversion:Left: 5/5  Pain:  Right: 5/5  Pain:      Anterior Tibialis:Left: 5/5  Pain:  Right: 5/5  Pain:  Posterior Tibialis: Left: 4+/5   Pain:+  Right: 4+/5   Pain:+  Peroneals: Left: 5-/5  Pain:  Right: 5-/5  Pain:          SPECIAL TESTS: not assessed    PALPATION: Palpation of ankle: right peroneus brevis and longus muscle tightness with palpation   Left ankle tenderness present at:  achilles tendon; posterior tibialis and plantar fascia  Right ankle tenderness present at:   achilles tendon; posterior tibialis and plantar fascia  EDEMA: not assessed          MOBILITY TESTING:        Talocrural Left: normal    Talocrural Right: normal  Subtalar Left: normal    Subtalar Right: normal      FUNCTIONAL TESTS: not assessed                                                              General     ROS   XRAY: Views of the calcaneus bilaterally. No definite bony  abnormality of the calcaneus is identified on the right or left. No  evidence of cortical disruption or abnormality of the medullary bone  of the calcaneus on the right or left.    GS length: 0 degrees (B)     Assessment/Plan:    Patient is a 31 year old female with both sides ankle complaints.    Patient has the following significant findings with corresponding treatment plan.                Diagnosis 1:  Foot pain, bilateral   Pain -  hot/cold therapy and manual therapy  Decreased ROM/flexibility - manual therapy, therapeutic exercise, therapeutic activity and home program  Decreased strength - therapeutic exercise, therapeutic activities and home program  Impaired muscle performance - neuro re-education and home program  Decreased function - therapeutic activities and home program  Diagnosis 2:  Plantar fasciitis  Foot pain, bilateral   Pain -  hot/cold therapy and manual therapy  Decreased ROM/flexibility - manual therapy, therapeutic exercise, therapeutic activity and home program  Decreased strength - therapeutic exercise, therapeutic activities and home program  Impaired muscle performance - neuro re-education and home program  Decreased function - therapeutic activities and home program    Diagnosis 3: Peroneal tendinitis of right lower extremity    Pain -  hot/cold therapy and manual therapy  Decreased ROM/flexibility - manual therapy, therapeutic exercise, therapeutic activity and home program  Decreased strength - therapeutic exercise, therapeutic activities and home program  Impaired muscle performance - neuro re-education and home program  Decreased function - therapeutic activities and home program      Diagnosis 4:  left  posterior tibialis tendonitis   Pain -  hot/cold therapy and manual therapy  Decreased ROM/flexibility - manual therapy, therapeutic exercise, therapeutic activity and home program  Decreased strength - therapeutic exercise, therapeutic activities and home program  Impaired muscle performance - neuro re-education and home program  Decreased function - therapeutic activities and home program  Therapy Evaluation Codes:   1)   Clinical presentation characteristics are:   Stable/Uncomplicated.  2) Decision-Making    Low complexity using standardized patient assessment instrument and/or measureable assessment of functional outcome.  Cumulative Therapy Evaluation is: Low complexity.    Previous and current functional limitations:  (See Goal Flow Sheet for this information)    Short term and Long term goals: (See Goal Flow Sheet for this information)     Communication ability:  Patient appears to be able to clearly communicate and understand verbal and written communication and follow directions correctly.  Treatment Explanation - The following has been discussed with the patient:   RX ordered/plan of care  Anticipated outcomes  Possible risks and side effects  This patient would benefit from PT intervention to resume normal activities.   Rehab potential is good.    Frequency:  1 X week, once daily  Duration:  for 6 weeks  Discharge Plan:  Achieve all LTG.  Independent in home treatment program.  Reach maximal therapeutic benefit.    Please refer to the daily flowsheet for treatment today, total treatment time and time spent performing 1:1 timed codes.

## 2021-05-20 ENCOUNTER — APPOINTMENT (OUTPATIENT)
Dept: INTERPRETER SERVICES | Facility: CLINIC | Age: 31
End: 2021-05-20
Payer: COMMERCIAL

## 2021-05-20 ENCOUNTER — TELEPHONE (OUTPATIENT)
Dept: ORTHOPEDICS | Facility: CLINIC | Age: 31
End: 2021-05-20

## 2021-05-20 NOTE — TELEPHONE ENCOUNTER
[facility] Call Center    Phone Message    May a detailed message be left on voicemail: yes     Reason for Call: Other:  called w/patient on the phone. She was told someone would call her to schedule for her foot orthotics/shoes and no one has called her yet     Action Taken: Message routed to:  [facility]    Travel Screening: Not Applicable

## 2021-05-21 ENCOUNTER — APPOINTMENT (OUTPATIENT)
Dept: INTERPRETER SERVICES | Facility: CLINIC | Age: 31
End: 2021-05-21
Payer: COMMERCIAL

## 2021-05-21 NOTE — TELEPHONE ENCOUNTER
Called with . Informed patient that she ahs an appointment with Orthotics on June 7th, 2021 at 10:00am at the [facility] location. They will call a day before as a reminder.     Patient verbalized understanding.

## 2021-05-25 ENCOUNTER — APPOINTMENT (OUTPATIENT)
Dept: INTERPRETER SERVICES | Facility: CLINIC | Age: 31
End: 2021-05-25
Payer: COMMERCIAL

## 2021-05-26 ENCOUNTER — THERAPY VISIT (OUTPATIENT)
Dept: PHYSICAL THERAPY | Facility: CLINIC | Age: 31
End: 2021-05-26
Payer: COMMERCIAL

## 2021-05-26 DIAGNOSIS — M72.2 PLANTAR FASCIITIS: Primary | ICD-10-CM

## 2021-05-26 PROCEDURE — 97110 THERAPEUTIC EXERCISES: CPT | Performed by: PHYSICAL THERAPIST

## 2021-06-09 ENCOUNTER — APPOINTMENT (OUTPATIENT)
Dept: INTERPRETER SERVICES | Facility: CLINIC | Age: 31
End: 2021-06-09
Payer: COMMERCIAL

## 2021-06-15 ENCOUNTER — APPOINTMENT (OUTPATIENT)
Dept: GENERAL RADIOLOGY | Facility: CLINIC | Age: 31
End: 2021-06-15
Attending: EMERGENCY MEDICINE
Payer: COMMERCIAL

## 2021-06-15 ENCOUNTER — HOSPITAL ENCOUNTER (EMERGENCY)
Facility: CLINIC | Age: 31
Discharge: HOME | End: 2021-06-15
Attending: EMERGENCY MEDICINE | Admitting: EMERGENCY MEDICINE
Payer: COMMERCIAL

## 2021-06-15 VITALS
SYSTOLIC BLOOD PRESSURE: 125 MMHG | HEART RATE: 78 BPM | TEMPERATURE: 98 F | OXYGEN SATURATION: 100 % | RESPIRATION RATE: 16 BRPM | DIASTOLIC BLOOD PRESSURE: 78 MMHG

## 2021-06-15 DIAGNOSIS — M25.512 ACUTE PAIN OF LEFT SHOULDER: ICD-10-CM

## 2021-06-15 PROCEDURE — 99283 EMERGENCY DEPT VISIT LOW MDM: CPT | Performed by: EMERGENCY MEDICINE

## 2021-06-15 PROCEDURE — 73030 X-RAY EXAM OF SHOULDER: CPT | Mod: LT

## 2021-06-15 RX ORDER — IBUPROFEN 800 MG/1
800 TABLET, FILM COATED ORAL EVERY 6 HOURS PRN
Qty: 20 TABLET | Refills: 0 | Status: SHIPPED | OUTPATIENT
Start: 2021-06-15

## 2021-06-15 ASSESSMENT — ENCOUNTER SYMPTOMS
DIFFICULTY URINATING: 0
MYALGIAS: 1
ARTHRALGIAS: 0
EYE REDNESS: 0
FEVER: 0
COLOR CHANGE: 0
HEADACHES: 0
NECK STIFFNESS: 0
CONFUSION: 0
SHORTNESS OF BREATH: 0
ABDOMINAL PAIN: 0

## 2021-06-15 NOTE — DISCHARGE INSTRUCTIONS
Please make an appointment to follow up with [facility] (phone: [phone]) in 7 days unless symptoms completely resolve.

## 2021-06-15 NOTE — ED PROVIDER NOTES
[facility] EMERGENCY DEPARTMENT ([facility])   Ivy 15, 2021  ED 5 1:40 PM   History     Chief Complaint   Patient presents with     Shoulder Pain     The history is provided by the patient and medical records. A  was used (professional  via iPad).     Heike Musa is a 31 year old Danish speaking female who presents with left shoulder pain.  She has a distant history of cervicalgia and trapezius muscle strain in 2016 and 2018.  She presents today with left shoulder pain for 3 days duration.  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 She denies any precipitating injury.  She denies any distal numbness, weakness or tingling.  She states she has had similar symptoms in the past, 3 years ago. She was prescribed medications for this. She states that symptoms had been quiescent for years until recently. She was told this was a musculoskeletal pain and prescribed muscle relaxants. The pain has never been this intense before. She has tried an ice pack.     Pain is focal in left shoulder radiating up to her left neck. The pain worsens with moving her arm or body. The pain is feels like a pounding muscle strain. She has a little right sided shoulder pain.      PAST MEDICAL HISTORY:   Past Medical History:   Diagnosis Date     Migraines        PAST SURGICAL HISTORY: No past surgical history on file.    Past medical history, past surgical history, medications, and allergies were reviewed with the patient. Additional pertinent items: None    FAMILY HISTORY:   Family History   Problem Relation Age of Onset     Hypertension Mother        SOCIAL HISTORY:   Social History     Tobacco Use     Smoking status: Never Smoker     Smokeless tobacco: Never Used   Substance Use Topics     Alcohol use: No     Social history was reviewed with the patient. Additional pertinent items: None      Current Discharge Medication List      CONTINUE these medications which have CHANGED     Details   ibuprofen (ADVIL/MOTRIN) 800 MG tablet Take 1 tablet (800 mg) by mouth every 6 hours as needed for other (cramping)  Qty: 20 tablet, Refills: 0    Associated Diagnoses:  (normal spontaneous vaginal delivery)         CONTINUE these medications which have NOT CHANGED    Details   acetaminophen (TYLENOL) 325 MG tablet Take 1-2 tablets (325-650 mg) by mouth every 6 hours as needed for mild pain or pain  Qty: 100 tablet, Refills: 0    Associated Diagnoses:  (normal spontaneous vaginal delivery)      benzocaine-menthol (DERMOPLAST) 20-0.5 % AERO Apply 1 g topically every 8 hours as needed (perineal)  Qty: 1 g, Refills: 0    Associated Diagnoses: Perineal laceration during delivery, postpartum condition      diclofenac (VOLTAREN) 1 % topical gel Apply 2 g topically 4 times daily  Qty: 100 g, Refills: 4    Associated Diagnoses: Foot pain, bilateral; Plantar fasciitis; Pain in both feet      phenylephrine-shark liver oil-mineral oil-petrolatum (PREPARATION H) 0.25-14-74.9 % rectal ointment Place rectally 2 times daily as needed for hemorrhoids  Qty: 28 g, Refills: 1    Associated Diagnoses: External hemorrhoids      Prenatal Vit-Fe Fumarate-FA (PRENATAL MULTIVITAMIN W/IRON) 27-0.8 MG tablet Take 1 tablet by mouth daily  Qty: 90 tablet, Refills: 1    Associated Diagnoses: Encounter for supervision of normal first pregnancy in second trimester              No Known Allergies     Review of Systems   Constitutional: Negative for fever.   HENT: Negative for congestion.    Eyes: Negative for redness.   Respiratory: Negative for shortness of breath.    Cardiovascular: Negative for chest pain.   Gastrointestinal: Negative for abdominal pain.   Genitourinary: Negative for difficulty urinating.   Musculoskeletal: Positive for myalgias. Negative for arthralgias and neck stiffness.        Left shoulder pain   Skin: Negative for color change.   Neurological: Negative for headaches.   Psychiatric/Behavioral: Negative  for confusion.     A complete review of systems was performed with pertinent positives and negatives noted in the HPI, and all other systems negative.    Physical Exam   BP: 125/78  Pulse: 78  Temp: 98  F (36.7  C)  Resp: 16  SpO2: 100 %      Physical Exam  Constitutional:       General: She is not in acute distress.     Appearance: She is not diaphoretic.   HENT:      Head: Atraumatic.      Mouth/Throat:      Pharynx: No oropharyngeal exudate.   Eyes:      General: No scleral icterus.     Pupils: Pupils are equal, round, and reactive to light.   Cardiovascular:      Heart sounds: Normal heart sounds.   Pulmonary:      Effort: No respiratory distress.      Breath sounds: Normal breath sounds.   Abdominal:      General: Bowel sounds are normal.      Palpations: Abdomen is soft.      Tenderness: There is no abdominal tenderness.   Musculoskeletal:      Left shoulder: She exhibits tenderness and pain. She exhibits normal range of motion, no bony tenderness and no swelling.   Skin:     General: Skin is warm.      Findings: No rash.         ED Course        Procedures           The medical record was reviewed and interpreted.  Current images reviewed and interpreted: No acute injury.                 Results for orders placed or performed during the hospital encounter of 06/15/21 (from the past 24 hour(s))   XR Shoulder Left G/E 3 Views    Narrative    XR SHOULDER LEFT G/E 3 VIEWS   6/15/2021 2:42 PM     HISTORY:  Pain      Impression    IMPRESSION: Unremarkable exam.    ENRIKE RDZ MD     Medications - No data to display          Assessments & Plan (with Medical Decision Making)   31-year-old female who presents for evaluation of 3-day history of shoulder pain.  Differential includes tendinitis, dislocation, occult fracture, arthritis, strain.  Exam reveals normal range of motion and tenderness over her left deltoid muscle.  X-ray reveals no evidence of fracture or dislocation.  Patient will be discharged with  instructions on use of ibuprofen and I have referred her to a primary care physician for follow-up.    I have reviewed the nursing notes.    I have reviewed the findings, diagnosis, plan and need for follow up with the patient.    Current Discharge Medication List          Final diagnoses:   Acute pain of left shoulder       6/15/2021   [facility] EMERGENCY DEPARTMENT     Alonzo Watkins MD  06/15/21 [id number]

## 2021-08-06 ENCOUNTER — OFFICE VISIT (OUTPATIENT)
Dept: ORTHOPEDICS | Facility: CLINIC | Age: 31
End: 2021-08-06
Payer: COMMERCIAL

## 2021-08-06 DIAGNOSIS — M76.822 POSTERIOR TIBIAL TENDON DYSFUNCTION (PTTD) OF LEFT LOWER EXTREMITY: ICD-10-CM

## 2021-08-06 DIAGNOSIS — M21.42 PES PLANUS OF BOTH FEET: ICD-10-CM

## 2021-08-06 DIAGNOSIS — M79.672 FOOT PAIN, BILATERAL: ICD-10-CM

## 2021-08-06 DIAGNOSIS — M21.41 PES PLANUS OF BOTH FEET: ICD-10-CM

## 2021-08-06 DIAGNOSIS — S93.491A SPRAIN OF ANTERIOR TALOFIBULAR LIGAMENT OF RIGHT ANKLE, INITIAL ENCOUNTER: Primary | ICD-10-CM

## 2021-08-06 DIAGNOSIS — M79.671 FOOT PAIN, BILATERAL: ICD-10-CM

## 2021-08-06 PROCEDURE — 99213 OFFICE O/P EST LOW 20 MIN: CPT | Performed by: PODIATRIST

## 2021-08-06 NOTE — LETTER
8/6/2021         RE: Heike Musa  [address]  [place] [place] [facility] [address]        Dear Colleague,    Thank you for referring your patient, Heike Musa, to the [facility] [place]. Please see a copy of my visit note below.    Date of Service: 5/5/2021    Chief Complaint   Patient presents with     RECHECK     2 month follow up orthotics check           HPI: Heike is a 31 year old female who presents today for further evaluation of BL heel pain.  She relates that she does have the orthotics.  She relates that these have been helpful in reducing her pain.  She does sometimes get pain in the ATF ligaments bilaterally with the first up in the morning.  This does get better when she starts to walk.  They do hurt sometimes go up and down stairs.  She does use the Voltaren gel.     Review of Systems: No n/v/d/f/c/ns/sob/cp    PMH:   Past Medical History:   Diagnosis Date     Migraines        PSxH: No past surgical history on file.    Allergies: Patient has no known allergies.    SH:   Social History     Socioeconomic History     Marital status: Single     Spouse name: Not on file     Number of children: Not on file     Years of education: Not on file     Highest education level: Not on file   Occupational History     Not on file   Tobacco Use     Smoking status: Never Smoker     Smokeless tobacco: Never Used   Substance and Sexual Activity     Alcohol use: No     Drug use: No     Sexual activity: Yes     Partners: Male   Other Topics Concern     Not on file   Social History Narrative     Not on file     Social Determinants of Health     Financial Resource Strain:      Difficulty of Paying Living Expenses:    Food Insecurity:      Worried About Running Out of Food in the Last Year:      Ran Out of Food in the Last Year:    Transportation Needs:      Lack of Transportation (Medical):      Lack of Transportation (Non-Medical):    Physical Activity:      Days of Exercise per Week:       Minutes of Exercise per Session:    Stress:      Feeling of Stress :    Social Connections:      Frequency of Communication with Friends and Family:      Frequency of Social Gatherings with Friends and Family:      Attends Pentecostal Services:      Active Member of Clubs or Organizations:      Attends Club or Organization Meetings:      Marital Status:    Intimate Partner Violence: Not At Risk     Fear of Current or Ex-Partner: No     Emotionally Abused: No     Physically Abused: No     Sexually Abused: No       FH:   Family History   Problem Relation Age of Onset     Hypertension Mother        Objective:  Data Unavailable Data Unavailable Data Unavailable Data Unavailable Data Unavailable 0 lbs 0 oz    PT and DP pulses are 2/4 bilaterally. CRT is instant. Positive pedal hair.   Gross sensation is intact bilaterally.   Equinus is noted bilaterally.  No pain noted today with palpation along the course of the PT, peroneal, or Achilles tendons bilaterally.  No pain noted in the plantar fascia bilaterally.  Some pain noted with palpation of the ATFL ligaments on bilateral ankles.  bilateral calcaneal xrays indicated in 4 weightbearing views.      Assessment: BL plantar fascitis and tendonitis 2/2 severe pes planus.  This has improved.  She now has some ATFL pain bilaterally.    Plan:  - Pt seen and evaluated.  -Continue with the orthotics for now.  -Continue with the Voltaren gel as needed.  -I did write an order for physical therapy for the ATFL pain.  - See again in 2 months.        Tito Williamson DPM

## 2021-08-06 NOTE — PROGRESS NOTES
Date of Service: 5/5/2021    Chief Complaint   Patient presents with     RECHECK     2 month follow up orthotics check           HPI: Heike is a 31 year old female who presents today for further evaluation of BL heel pain.  She relates that she does have the orthotics.  She relates that these have been helpful in reducing her pain.  She does sometimes get pain in the ATF ligaments bilaterally with the first up in the morning.  This does get better when she starts to walk.  They do hurt sometimes go up and down stairs.  She does use the Voltaren gel.     Review of Systems: No n/v/d/f/c/ns/sob/cp    PMH:   Past Medical History:   Diagnosis Date     Migraines        PSxH: No past surgical history on file.    Allergies: Patient has no known allergies.    SH:   Social History     Socioeconomic History     Marital status: Single     Spouse name: Not on file     Number of children: Not on file     Years of education: Not on file     Highest education level: Not on file   Occupational History     Not on file   Tobacco Use     Smoking status: Never Smoker     Smokeless tobacco: Never Used   Substance and Sexual Activity     Alcohol use: No     Drug use: No     Sexual activity: Yes     Partners: Male   Other Topics Concern     Not on file   Social History Narrative     Not on file     Social Determinants of Health     Financial Resource Strain:      Difficulty of Paying Living Expenses:    Food Insecurity:      Worried About Running Out of Food in the Last Year:      Ran Out of Food in the Last Year:    Transportation Needs:      Lack of Transportation (Medical):      Lack of Transportation (Non-Medical):    Physical Activity:      Days of Exercise per Week:      Minutes of Exercise per Session:    Stress:      Feeling of Stress :    Social Connections:      Frequency of Communication with Friends and Family:      Frequency of Social Gatherings with Friends and Family:      Attends Jainism Services:      Active Member of  Clubs or Organizations:      Attends Club or Organization Meetings:      Marital Status:    Intimate Partner Violence: Not At Risk     Fear of Current or Ex-Partner: No     Emotionally Abused: No     Physically Abused: No     Sexually Abused: No       FH:   Family History   Problem Relation Age of Onset     Hypertension Mother        Objective:  Data Unavailable Data Unavailable Data Unavailable Data Unavailable Data Unavailable 0 lbs 0 oz    PT and DP pulses are 2/4 bilaterally. CRT is instant. Positive pedal hair.   Gross sensation is intact bilaterally.   Equinus is noted bilaterally.  No pain noted today with palpation along the course of the PT, peroneal, or Achilles tendons bilaterally.  No pain noted in the plantar fascia bilaterally.  Some pain noted with palpation of the ATFL ligaments on bilateral ankles.  bilateral calcaneal xrays indicated in 4 weightbearing views.      Assessment: BL plantar fascitis and tendonitis 2/2 severe pes planus.  This has improved.  She now has some ATFL pain bilaterally.    Plan:  - Pt seen and evaluated.  -Continue with the orthotics for now.  -Continue with the Voltaren gel as needed.  -I did write an order for physical therapy for the ATFL pain.  - See again in 2 months.

## 2021-10-16 NOTE — TELEPHONE ENCOUNTER
FUTURE VISIT INFORMATION      FUTURE VISIT INFORMATION:    Date: 7/28/2020    Time: 2PM    [place] [facility] [place]  REFERRAL INFORMATION:    Referring provider:  Alycia Hudson MD     Referring providers clinic:  [facility]     Reason for visit/diagnosis  Suspicious right thyroid nodule, painful lymph nodes - referred by Dr. Hudson    RECORDS REQUESTED FROM:       Clinic name Comments Records Status Imaging Status   [facility]  7/24/2020 note and referral from Alycia Hudson MD  EPIC    Imaging 7/11/19 US FNA   2/4/19 US Thyroid Epic PACS                                Pt. was standing on outdoor countertop and fell onto concrete injuring right arm, denies head injury/LOC or vomiting. Swelling and deformity noted to right elbow, pulses and cap refill WNL, skin noted fully intact. No MHx/Shx, NKA, IUTD.

## 2021-12-08 ENCOUNTER — HOSPITAL ENCOUNTER (EMERGENCY)
Facility: CLINIC | Age: 31
Discharge: HOME | End: 2021-12-08
Attending: EMERGENCY MEDICINE | Admitting: EMERGENCY MEDICINE
Payer: COMMERCIAL

## 2021-12-08 VITALS
DIASTOLIC BLOOD PRESSURE: 83 MMHG | TEMPERATURE: 97.8 F | BODY MASS INDEX: 24.89 KG/M2 | WEIGHT: 145 LBS | OXYGEN SATURATION: 100 % | SYSTOLIC BLOOD PRESSURE: 124 MMHG | RESPIRATION RATE: 16 BRPM | HEART RATE: 55 BPM

## 2021-12-08 DIAGNOSIS — K29.00 ACUTE GASTRITIS WITHOUT HEMORRHAGE, UNSPECIFIED GASTRITIS TYPE: ICD-10-CM

## 2021-12-08 LAB
ALBUMIN SERPL-MCNC: 3.8 G/DL (ref 3.4–5)
ALBUMIN UR-MCNC: 10 MG/DL
ALP SERPL-CCNC: 108 U/L (ref 40–150)
ALT SERPL W P-5'-P-CCNC: 22 U/L (ref 0–50)
ANION GAP SERPL CALCULATED.3IONS-SCNC: 4 MMOL/L (ref 3–14)
APPEARANCE UR: CLEAR
AST SERPL W P-5'-P-CCNC: 22 U/L (ref 0–45)
BACTERIA #/AREA URNS HPF: ABNORMAL /HPF
BASOPHILS # BLD AUTO: 0 10E3/UL (ref 0–0.2)
BASOPHILS NFR BLD AUTO: 0 %
BILIRUB SERPL-MCNC: 0.8 MG/DL (ref 0.2–1.3)
BILIRUB UR QL STRIP: NEGATIVE
BUN SERPL-MCNC: 8 MG/DL (ref 7–30)
CALCIUM SERPL-MCNC: 9.2 MG/DL (ref 8.5–10.1)
CHLORIDE BLD-SCNC: 106 MMOL/L (ref 94–109)
CO2 SERPL-SCNC: 27 MMOL/L (ref 20–32)
COLOR UR AUTO: YELLOW
CREAT SERPL-MCNC: 0.45 MG/DL (ref 0.52–1.04)
EOSINOPHIL # BLD AUTO: 0 10E3/UL (ref 0–0.7)
EOSINOPHIL NFR BLD AUTO: 0 %
ERYTHROCYTE [DISTWIDTH] IN BLOOD BY AUTOMATED COUNT: 12.1 % (ref 10–15)
GFR SERPL CREATININE-BSD FRML MDRD: >90 ML/MIN/1.73M2
GLUCOSE BLD-MCNC: 97 MG/DL (ref 70–99)
GLUCOSE UR STRIP-MCNC: NEGATIVE MG/DL
HCG UR QL: NEGATIVE
HCT VFR BLD AUTO: 42.6 % (ref 35–47)
HGB BLD-MCNC: 14.4 G/DL (ref 11.7–15.7)
HGB UR QL STRIP: NEGATIVE
IMM GRANULOCYTES # BLD: 0 10E3/UL
IMM GRANULOCYTES NFR BLD: 0 %
INTERNAL QC OK POCT: NORMAL
KETONES UR STRIP-MCNC: 10 MG/DL
LEUKOCYTE ESTERASE UR QL STRIP: ABNORMAL
LIPASE SERPL-CCNC: 155 U/L (ref 73–393)
LYMPHOCYTES # BLD AUTO: 1.7 10E3/UL (ref 0.8–5.3)
LYMPHOCYTES NFR BLD AUTO: 24 %
MCH RBC QN AUTO: 29.6 PG (ref 26.5–33)
MCHC RBC AUTO-ENTMCNC: 33.8 G/DL (ref 31.5–36.5)
MCV RBC AUTO: 88 FL (ref 78–100)
MONOCYTES # BLD AUTO: 0.7 10E3/UL (ref 0–1.3)
MONOCYTES NFR BLD AUTO: 11 %
MUCOUS THREADS #/AREA URNS LPF: PRESENT /LPF
NEUTROPHILS # BLD AUTO: 4.4 10E3/UL (ref 1.6–8.3)
NEUTROPHILS NFR BLD AUTO: 65 %
NITRATE UR QL: NEGATIVE
NRBC # BLD AUTO: 0 10E3/UL
NRBC BLD AUTO-RTO: 0 /100
PH UR STRIP: 5.5 [PH] (ref 5–7)
PLATELET # BLD AUTO: 239 10E3/UL (ref 150–450)
POTASSIUM BLD-SCNC: 4.1 MMOL/L (ref 3.4–5.3)
PROT SERPL-MCNC: 8.3 G/DL (ref 6.8–8.8)
RBC # BLD AUTO: 4.86 10E6/UL (ref 3.8–5.2)
RBC URINE: 2 /HPF
SODIUM SERPL-SCNC: 137 MMOL/L (ref 133–144)
SP GR UR STRIP: 1.03 (ref 1–1.03)
SQUAMOUS EPITHELIAL: 13 /HPF
UROBILINOGEN UR STRIP-MCNC: NORMAL MG/DL
WBC # BLD AUTO: 6.9 10E3/UL (ref 4–11)
WBC URINE: 8 /HPF

## 2021-12-08 PROCEDURE — 250N000013 HC RX MED GY IP 250 OP 250 PS 637: Performed by: EMERGENCY MEDICINE

## 2021-12-08 PROCEDURE — 81001 URINALYSIS AUTO W/SCOPE: CPT | Performed by: EMERGENCY MEDICINE

## 2021-12-08 PROCEDURE — 80053 COMPREHEN METABOLIC PANEL: CPT | Performed by: EMERGENCY MEDICINE

## 2021-12-08 PROCEDURE — 258N000003 HC RX IP 258 OP 636

## 2021-12-08 PROCEDURE — 96361 HYDRATE IV INFUSION ADD-ON: CPT | Performed by: EMERGENCY MEDICINE

## 2021-12-08 PROCEDURE — 250N000009 HC RX 250: Performed by: EMERGENCY MEDICINE

## 2021-12-08 PROCEDURE — 99284 EMERGENCY DEPT VISIT MOD MDM: CPT | Mod: 25 | Performed by: EMERGENCY MEDICINE

## 2021-12-08 PROCEDURE — 96374 THER/PROPH/DIAG INJ IV PUSH: CPT | Performed by: EMERGENCY MEDICINE

## 2021-12-08 PROCEDURE — 250N000011 HC RX IP 250 OP 636: Performed by: EMERGENCY MEDICINE

## 2021-12-08 PROCEDURE — 85025 COMPLETE CBC W/AUTO DIFF WBC: CPT | Performed by: EMERGENCY MEDICINE

## 2021-12-08 PROCEDURE — 81025 URINE PREGNANCY TEST: CPT | Performed by: EMERGENCY MEDICINE

## 2021-12-08 PROCEDURE — 36415 COLL VENOUS BLD VENIPUNCTURE: CPT | Performed by: EMERGENCY MEDICINE

## 2021-12-08 PROCEDURE — 83690 ASSAY OF LIPASE: CPT | Performed by: EMERGENCY MEDICINE

## 2021-12-08 PROCEDURE — 99284 EMERGENCY DEPT VISIT MOD MDM: CPT | Performed by: EMERGENCY MEDICINE

## 2021-12-08 RX ORDER — SODIUM CHLORIDE 9 MG/ML
INJECTION, SOLUTION INTRAVENOUS
Status: COMPLETED
Start: 2021-12-08 | End: 2021-12-08

## 2021-12-08 RX ORDER — ONDANSETRON 2 MG/ML
4 INJECTION INTRAMUSCULAR; INTRAVENOUS ONCE
Status: COMPLETED | OUTPATIENT
Start: 2021-12-08 | End: 2021-12-08

## 2021-12-08 RX ORDER — FAMOTIDINE 40 MG/1
40 TABLET, FILM COATED ORAL DAILY
Qty: 14 TABLET | Refills: 0 | Status: SHIPPED | OUTPATIENT
Start: 2021-12-08 | End: 2021-12-22

## 2021-12-08 RX ORDER — ONDANSETRON 4 MG/1
4 TABLET, ORALLY DISINTEGRATING ORAL EVERY 8 HOURS PRN
Qty: 10 TABLET | Refills: 0 | Status: SHIPPED | OUTPATIENT
Start: 2021-12-08 | End: 2021-12-11

## 2021-12-08 RX ADMIN — ONDANSETRON 4 MG: 2 INJECTION INTRAMUSCULAR; INTRAVENOUS at 11:46

## 2021-12-08 RX ADMIN — LIDOCAINE HYDROCHLORIDE 30 ML: 20 SOLUTION OROPHARYNGEAL at 11:45

## 2021-12-08 RX ADMIN — SODIUM CHLORIDE 1000 ML: 9 INJECTION, SOLUTION INTRAVENOUS at 11:47

## 2021-12-08 ASSESSMENT — ENCOUNTER SYMPTOMS
ABDOMINAL PAIN: 1
VOMITING: 1
NAUSEA: 1

## 2021-12-08 NOTE — DISCHARGE INSTRUCTIONS
Thank you for coming to the [facility] Emergency Department.     Please start the antacid, pepcid, once daily for 2 weeks. You may also use the nausea medication, zofran, every 8 hours as needed.   Expect a call from the clinic to schedule a primary care appointment for re-evaluation of your symptoms and to arrange further testing if you are not feeling better.     Return to the ER if you have severe stomach pain or see blood in vomit or stool.

## 2021-12-08 NOTE — LETTER
December 8, 2021      To Whom It May Concern:      Heike Musa was seen in our Emergency Department today, 12/08/21.  I expect her condition to improve over the next few days.  She may return to work/school on 12/13/2021.    Sincerely,        Madhuri Barry MD FACEP

## 2021-12-08 NOTE — ED PROVIDER NOTES
[facility] EMERGENCY DEPARTMENT ([facility])    12/08/21     ED 20   History     Chief Complaint   Patient presents with     Nausea & Vomiting     HPI  Heike Musa is a 31 year old female who presents with nausea and vomiting. She has been feeling unwell since Saturday.  She has noticed discomfort in the epigastric area and a sensation of nausea. Today she developed vomiting and also in the last 24 hours has had a few loose stools.  Stools and emesis are both nonbloody.  There has been no fever, no radiation of pain to the right upper quadrant, no pain worsening with eating, no urinary symptoms.  She has not had this previously, does not have a known history of peptic ulcer disease or H. pylori infection.  Her last menstrual period was November 25 and she does not think that she is pregnant.  There has been no associated cough, sore throat, URI symptoms or known COVID-19 infection exposure.  She is COVID-19 vaccinated.      This part of the document was transcribed by Mary Ann Mccallum, Medical Scribe.      Past Medical History  Past Medical History:   Diagnosis Date     Migraines      No past surgical history on file.  acetaminophen (TYLENOL) 325 MG tablet  benzocaine-menthol (DERMOPLAST) 20-0.5 % AERO  diclofenac (VOLTAREN) 1 % topical gel  famotidine (PEPCID) 40 MG tablet  ibuprofen (ADVIL/MOTRIN) 800 MG tablet  ondansetron (ZOFRAN ODT) 4 MG ODT tab  phenylephrine-shark liver oil-mineral oil-petrolatum (PREPARATION H) 0.25-14-74.9 % rectal ointment  Prenatal Vit-Fe Fumarate-FA (PRENATAL MULTIVITAMIN W/IRON) 27-0.8 MG tablet      No Known Allergies  Family History  Family History   Problem Relation Age of Onset     Hypertension Mother      Social History   Social History     Tobacco Use     Smoking status: Never Smoker     Smokeless tobacco: Never Used   Substance Use Topics     Alcohol use: No     Drug use: No      Past medical history, past surgical history, medications, allergies, family history, and  social history were reviewed with the patient. No additional pertinent items.       Review of Systems   Gastrointestinal: Positive for abdominal pain, nausea and vomiting. Diarrhea: loose stools.     A complete review of systems was performed with pertinent positives and negatives noted in the HPI, and all other systems negative.    Physical Exam   BP: 124/83  Pulse: 55  Temp: 97.8  F (36.6  C)  Resp: 16  Weight: 65.8 kg (145 lb)  SpO2: 100 %     Physical Exam  Gen:A&Ox3, no acute distress  HEENT:PERRL, no facial tenderness or wounds, head atraumatic, oropharynx clear, mucous membranes moist, TMs clear bilaterally  Neck: no cervical lymphadenopathy  Back: no CVA tenderness  CV:RRR without murmurs  PULM:Clear to auscultation bilaterally  Abd:soft, minimal tenderness in the epigastrium with deep palpation. Negative fox's sign. No tenderness in RUQ or bilateral lower quadrants.   UE:No traumatic injuries, skin normal  LE:no traumatic injuries, skin normal, no LE edema.   Skin: no rashes or ecchymoses     ED Course      Procedures       Results for orders placed or performed during the hospital encounter of 12/08/21   Comprehensive metabolic panel     Status: Abnormal   Result Value Ref Range    Sodium 137 133 - 144 mmol/L    Potassium 4.1 3.4 - 5.3 mmol/L    Chloride 106 94 - 109 mmol/L    Carbon Dioxide (CO2) 27 20 - 32 mmol/L    Anion Gap 4 3 - 14 mmol/L    Urea Nitrogen 8 7 - 30 mg/dL    Creatinine 0.45 (L) 0.52 - 1.04 mg/dL    Calcium 9.2 8.5 - 10.1 mg/dL    Glucose 97 70 - 99 mg/dL    Alkaline Phosphatase 108 40 - 150 U/L    AST 22 0 - 45 U/L    ALT 22 0 - 50 U/L    Protein Total 8.3 6.8 - 8.8 g/dL    Albumin 3.8 3.4 - 5.0 g/dL    Bilirubin Total 0.8 0.2 - 1.3 mg/dL    GFR Estimate >90 >60 mL/min/1.73m2   Lipase     Status: Normal   Result Value Ref Range    Lipase 155 73 - 393 U/L   UA with Microscopic reflex to Culture     Status: Abnormal    Specimen: Urine, Midstream   Result Value Ref Range    Color Urine  Yellow Colorless, Straw, Light Yellow, Yellow    Appearance Urine Clear Clear    Glucose Urine Negative Negative mg/dL    Bilirubin Urine Negative Negative    Ketones Urine 10  (A) Negative mg/dL    Specific Gravity Urine 1.029 1.003 - 1.035    Blood Urine Negative Negative    pH Urine 5.5 5.0 - 7.0    Protein Albumin Urine 10  (A) Negative mg/dL    Urobilinogen Urine Normal Normal, 2.0 mg/dL    Nitrite Urine Negative Negative    Leukocyte Esterase Urine Small (A) Negative    Bacteria Urine Moderate (A) None Seen /HPF    Mucus Urine Present (A) None Seen /LPF    RBC Urine 2 <=2 /HPF    WBC Urine 8 (H) <=5 /HPF    Squamous Epithelials Urine 13 (H) <=1 /HPF    Narrative    Urine Culture not indicated   CBC with platelets and differential     Status: None   Result Value Ref Range    WBC Count 6.9 4.0 - 11.0 10e3/uL    RBC Count 4.86 3.80 - 5.20 10e6/uL    Hemoglobin 14.4 11.7 - 15.7 g/dL    Hematocrit 42.6 35.0 - 47.0 %    MCV 88 78 - 100 fL    MCH 29.6 26.5 - 33.0 pg    MCHC 33.8 31.5 - 36.5 g/dL    RDW 12.1 10.0 - 15.0 %    Platelet Count 239 150 - 450 10e3/uL    % Neutrophils 65 %    % Lymphocytes 24 %    % Monocytes 11 %    % Eosinophils 0 %    % Basophils 0 %    % Immature Granulocytes 0 %    NRBCs per 100 WBC 0 <1 /100    Absolute Neutrophils 4.4 1.6 - 8.3 10e3/uL    Absolute Lymphocytes 1.7 0.8 - 5.3 10e3/uL    Absolute Monocytes 0.7 0.0 - 1.3 10e3/uL    Absolute Eosinophils 0.0 0.0 - 0.7 10e3/uL    Absolute Basophils 0.0 0.0 - 0.2 10e3/uL    Absolute Immature Granulocytes 0.0 <=0.4 10e3/uL    Absolute NRBCs 0.0 10e3/uL   hCG qual urine POCT     Status: Normal   Result Value Ref Range    HCG Qual Urine Negative Negative    Internal QC Check POCT Valid Valid   CBC with platelets differential     Status: None    Narrative    The following orders were created for panel order CBC with platelets differential.  Procedure                               Abnormality         Status                     ---------                                -----------         ------                     CBC with platelets and d...[[id number]]                      Final result                 Please view results for these tests on the individual orders.     Medications   sodium chloride 0.9 % infusion (0 mLs  Stopped 12/8/21 1310)   ondansetron (ZOFRAN) injection 4 mg (4 mg Intravenous Given 12/8/21 1146)   lidocaine (viscous) (XYLOCAINE) 2 % 15 mL, alum & mag hydroxide-simethicone (MAALOX) 15 mL GI Cocktail (30 mLs Oral Given 12/8/21 1145)        Assessments & Plan (with Medical Decision Making)   31-year-old female presenting with epigastric pain associated with nausea, vomiting, and loose stool.    DDx: acute gastroenteritis, peptic ulcer disease, or H. pylori infection with gastritis.    IV access was obtained, laboratory testing done.  CBC, metabolic panel, lipase and pregnancy test were done these are unremarkable.  Urinalysis contaminated. Pt without UTI symptoms. Will send for culture.   Treated with 1L 0.9 normal saline as well as IV Zofran and a GI cocktail for symptoms.  She was reassessed and improved. Will treat as gastritis or PUD with zofran PRN and daily PPI for 2 weeks. Referred to primary care for re-assessment if not improving.   Dietary changes also reviewed.     This part of the document was transcribed by Mary Ann Mccallum, Medical Scribe.      I have reviewed the nursing notes. I have reviewed the findings, diagnosis, plan and need for follow up with the patient.    Discharge Medication List as of 12/8/2021  2:15 PM      START taking these medications    Details   famotidine (PEPCID) 40 MG tablet Take 1 tablet (40 mg) by mouth daily for 14 days, Disp-14 tablet, R-0, E-Prescribe      ondansetron (ZOFRAN ODT) 4 MG ODT tab Take 1 tablet (4 mg) by mouth every 8 hours as needed for nausea or vomiting, Disp-10 tablet, R-0, E-Prescribe             Final diagnoses:   Acute gastritis without hemorrhage, unspecified gastritis type        --  Madhuri Barry MD [facility] EMERGENCY DEPARTMENT  12/8/2021     Madhuri Barry MD  12/10/21 1929

## 2021-12-09 PROBLEM — M54.2 CERVICALGIA: Status: RESOLVED | Noted: 2018-04-03 | Resolved: 2021-12-09

## 2021-12-30 ENCOUNTER — LAB REQUISITION (OUTPATIENT)
Dept: LAB | Facility: CLINIC | Age: 31
End: 2021-12-30
Payer: COMMERCIAL

## 2021-12-30 DIAGNOSIS — Z71.84 ENCOUNTER FOR HEALTH COUNSELING RELATED TO TRAVEL: ICD-10-CM

## 2021-12-30 LAB
ALBUMIN SERPL-MCNC: 3.5 G/DL (ref 3.4–5)
ALP SERPL-CCNC: 83 U/L (ref 40–150)
ALT SERPL W P-5'-P-CCNC: 17 U/L (ref 0–50)
ANION GAP SERPL CALCULATED.3IONS-SCNC: 6 MMOL/L (ref 3–14)
AST SERPL W P-5'-P-CCNC: 16 U/L (ref 0–45)
BILIRUB SERPL-MCNC: 0.4 MG/DL (ref 0.2–1.3)
BUN SERPL-MCNC: 10 MG/DL (ref 7–30)
CALCIUM SERPL-MCNC: 8.8 MG/DL (ref 8.5–10.1)
CHLORIDE BLD-SCNC: 107 MMOL/L (ref 94–109)
CO2 SERPL-SCNC: 26 MMOL/L (ref 20–32)
CREAT SERPL-MCNC: 0.56 MG/DL (ref 0.52–1.04)
GFR SERPL CREATININE-BSD FRML MDRD: >90 ML/MIN/1.73M2
GLUCOSE BLD-MCNC: 85 MG/DL (ref 70–99)
POTASSIUM BLD-SCNC: 4.8 MMOL/L (ref 3.4–5.3)
PROT SERPL-MCNC: 7.2 G/DL (ref 6.8–8.8)
SODIUM SERPL-SCNC: 139 MMOL/L (ref 133–144)

## 2021-12-30 PROCEDURE — 80053 COMPREHEN METABOLIC PANEL: CPT | Mod: ORL | Performed by: INTERNAL MEDICINE

## 2021-12-30 PROCEDURE — 82306 VITAMIN D 25 HYDROXY: CPT | Mod: ORL | Performed by: INTERNAL MEDICINE

## 2021-12-31 LAB — DEPRECATED CALCIDIOL+CALCIFEROL SERPL-MC: 19 UG/L (ref 20–75)

## 2022-08-31 ENCOUNTER — LAB REQUISITION (OUTPATIENT)
Dept: LAB | Facility: CLINIC | Age: 32
End: 2022-08-31
Payer: COMMERCIAL

## 2022-08-31 DIAGNOSIS — N92.6 IRREGULAR MENSTRUATION, UNSPECIFIED: ICD-10-CM

## 2022-08-31 LAB
FSH SERPL IRP2-ACNC: 7.8 MIU/ML
PROLACTIN SERPL 3RD IS-MCNC: 8 NG/ML (ref 5–23)
TSH SERPL DL<=0.005 MIU/L-ACNC: 0.58 UIU/ML (ref 0.3–4.2)

## 2022-08-31 PROCEDURE — 84443 ASSAY THYROID STIM HORMONE: CPT | Mod: ORL | Performed by: ADVANCED PRACTICE MIDWIFE

## 2022-08-31 PROCEDURE — 83001 ASSAY OF GONADOTROPIN (FSH): CPT | Mod: ORL | Performed by: ADVANCED PRACTICE MIDWIFE

## 2022-08-31 PROCEDURE — 84146 ASSAY OF PROLACTIN: CPT | Mod: ORL | Performed by: ADVANCED PRACTICE MIDWIFE

## 2022-09-23 ENCOUNTER — HOSPITAL ENCOUNTER (EMERGENCY)
Facility: CLINIC | Age: 32
Discharge: HOME | End: 2022-09-23
Attending: EMERGENCY MEDICINE | Admitting: EMERGENCY MEDICINE
Payer: COMMERCIAL

## 2022-09-23 VITALS
HEART RATE: 68 BPM | OXYGEN SATURATION: 99 % | RESPIRATION RATE: 16 BRPM | TEMPERATURE: 98 F | DIASTOLIC BLOOD PRESSURE: 84 MMHG | SYSTOLIC BLOOD PRESSURE: 134 MMHG

## 2022-09-23 DIAGNOSIS — L29.9 PRURITUS OF SKIN: ICD-10-CM

## 2022-09-23 DIAGNOSIS — K64.4 EXTERNAL HEMORRHOIDS: ICD-10-CM

## 2022-09-23 LAB
CLUE CELLS: ABNORMAL
TRICHOMONAS, WET PREP: ABNORMAL
WBC'S/HIGH POWER FIELD, WET PREP: ABNORMAL
YEAST, WET PREP: ABNORMAL

## 2022-09-23 PROCEDURE — 87491 CHLMYD TRACH DNA AMP PROBE: CPT | Performed by: EMERGENCY MEDICINE

## 2022-09-23 PROCEDURE — 87591 N.GONORRHOEAE DNA AMP PROB: CPT | Performed by: EMERGENCY MEDICINE

## 2022-09-23 PROCEDURE — 250N000011 HC RX IP 250 OP 636: Performed by: EMERGENCY MEDICINE

## 2022-09-23 PROCEDURE — 99284 EMERGENCY DEPT VISIT MOD MDM: CPT

## 2022-09-23 PROCEDURE — 99284 EMERGENCY DEPT VISIT MOD MDM: CPT | Performed by: EMERGENCY MEDICINE

## 2022-09-23 PROCEDURE — 96372 THER/PROPH/DIAG INJ SC/IM: CPT | Performed by: EMERGENCY MEDICINE

## 2022-09-23 PROCEDURE — 87210 SMEAR WET MOUNT SALINE/INK: CPT | Performed by: EMERGENCY MEDICINE

## 2022-09-23 RX ORDER — KETOROLAC TROMETHAMINE 30 MG/ML
30 INJECTION, SOLUTION INTRAMUSCULAR; INTRAVENOUS ONCE
Status: COMPLETED | OUTPATIENT
Start: 2022-09-23 | End: 2022-09-23

## 2022-09-23 RX ORDER — LIDOCAINE HCL AND HYDROCORTISONE ACETATE 28; 5.5 MG/G; MG/G
1 GEL RECTAL 2 TIMES DAILY PRN
Qty: 100 G | Refills: 0 | Status: SHIPPED | OUTPATIENT
Start: 2022-09-23 | End: 2022-09-30

## 2022-09-23 RX ORDER — BISACODYL 10 MG
10 SUPPOSITORY, RECTAL RECTAL DAILY PRN
Qty: 10 SUPPOSITORY | Refills: 0 | Status: SHIPPED | OUTPATIENT
Start: 2022-09-23

## 2022-09-23 RX ORDER — POLYETHYLENE GLYCOL 3350 17 G/17G
1 POWDER, FOR SOLUTION ORAL DAILY
Qty: 527 G | Refills: 0 | Status: SHIPPED | OUTPATIENT
Start: 2022-09-23 | End: 2022-10-23

## 2022-09-23 RX ADMIN — KETOROLAC TROMETHAMINE 30 MG: 30 INJECTION, SOLUTION INTRAMUSCULAR at 19:20

## 2022-09-23 ASSESSMENT — ENCOUNTER SYMPTOMS
CONSTIPATION: 0
ANAL BLEEDING: 0
HEMATURIA: 0
DYSURIA: 0
RECTAL PAIN: 1
DIFFICULTY URINATING: 0
ABDOMINAL PAIN: 0
FREQUENCY: 0

## 2022-09-23 ASSESSMENT — ACTIVITIES OF DAILY LIVING (ADL)
ADLS_ACUITY_SCORE: 33
ADLS_ACUITY_SCORE: 33

## 2022-09-23 NOTE — ED PROVIDER NOTES
[facility] EMERGENCY DEPARTMENT ([facility])    9/23/22        History     Chief Complaint   Patient presents with     Rectal/perineal Pain     The history is provided by the patient and medical records. The history is limited by a language barrier. A  was used (official [employer] ).     Heike Musa is a 32 year old female who presents to the Emergency Department with rectal pain and itching as well as vaginal itching. Patient reports she has been having rectal discomfort for the past 4 days, states she suspects hemorrhoids. She states she has pain constantly, not only with BMs. She states she has been constipated recently and straining to have a BM.  Patient reports rectal itching as well as vaginal itching. No vaginal discharge. Patient denies anal bleeding or bloody stools. No abdominal pain or urinary symptoms. Her last menstrual period was 9/8/22.    Past Medical History  Past Medical History:   Diagnosis Date     Migraines      No past surgical history on file.  acetaminophen (TYLENOL) 325 MG tablet  benzocaine-menthol (DERMOPLAST) 20-0.5 % AERO  diclofenac (VOLTAREN) 1 % topical gel  ibuprofen (ADVIL/MOTRIN) 800 MG tablet  phenylephrine-shark liver oil-mineral oil-petrolatum (PREPARATION H) 0.25-14-74.9 % rectal ointment  Prenatal Vit-Fe Fumarate-FA (PRENATAL MULTIVITAMIN W/IRON) 27-0.8 MG tablet      No Known Allergies  Family History  Family History   Problem Relation Age of Onset     Hypertension Mother      Social History   Social History     Tobacco Use     Smoking status: Never Smoker     Smokeless tobacco: Never Used   Substance Use Topics     Alcohol use: No     Drug use: No      Past medical history, past surgical history, medications, allergies, family history, and social history were reviewed with the patient. No additional pertinent items.       Review of Systems   Gastrointestinal: Positive for rectal pain. Negative for abdominal pain, anal bleeding and  constipation.   Genitourinary: Negative for difficulty urinating, dysuria, frequency, hematuria, urgency and vaginal discharge.   All other systems reviewed and are negative.    A complete review of systems was performed with pertinent positives and negatives noted in the HPI, and all other systems negative.    Physical Exam   BP: 137/87  Pulse: 63  Temp: 98.1  F (36.7  C)  Resp: 16  SpO2: 98 %  Physical Exam  General: patient is alert and oriented and in no acute distress   Head: atraumatic and normocephalic   EENT: moist mucus membranes, sclera anicteric    Neck: supple   Cardiovascular: regular rate and rhythm, extremities warm and well perfused, no lower extremity edema  Pulmonary: lungs clear to auscultation bilaterally   Abdomen: soft, non-tender, non-distended  Rectal: 3 moderate-sized external hemorrhoids without evidence of thrombosis or fissure, no palpable masses noted on rectal exam, no area of erythema or induration surrounding the rectum  Gyn: Female circumcision, no significant vaginal discharge, no cervical motion tenderness, no mucopurulent cervical discharge  Musculoskeletal: normal range of motion   Neurological: alert and oriented, moving all extremities symmetrically, gait normal   Skin: warm, dry     ED Course   4:24 PM  The patient was seen and examined by Kelly Enriquez MD in Room ED02.      Procedures                     No results found for any visits on 09/23/22.  Medications - No data to display     Assessments & Plan (with Medical Decision Making)   Ms. Musa is a 32 year old female who presents to the Emergency Department with rectal pain and itching as well as vaginal itching.  She is hemodynamically stable and afebrile.  On exam she does have multiple external hemorrhoids that are tender to palpation.  She does not have evidence of thrombosed hemorrhoids or anal fissure noted.  Do not suspect perirectal abscess or other infectious etiology based on her clinical exam.  Her pelvic  exam was unremarkable, no evidence of PID.  Wet prep is negative.  Will plan to discharge with lidocaine-hydrocortisone gel, metamucil, miralax, dulcolax supp, instructions to do regular sitz bath's and follow-up with colorectal surgery for reevaluation and definitive management if needed.  She was given close return precautions for the emergency department and voiced understanding.    I have reviewed the nursing notes. I have reviewed the findings, diagnosis, plan and need for follow up with the patient.    New Prescriptions    LIDOCAINE-HYDROCORTISONE ACE 2.8-0.55 % GEL    Place 1 Application rectally 2 times daily as needed (pain and itching)    PSYLLIUM (METAMUCIL SMOOTH TEXTURE) 28 % PACKET    Use 1 packet daily until having regular soft stools.       Final diagnoses:   External hemorrhoids       --  Kelly Enriquez MD  [facility] EMERGENCY DEPARTMENT  9/23/2022         Kelly Enriquez MD  09/23/22 [id number]

## 2022-09-23 NOTE — DISCHARGE INSTRUCTIONS
[facility] will call you to coordinate care as prescribed your provider. If you don t hear from a representative within 2 business days, please call [phone].    Take sitz baths 3 times a day.      Apply lidocaine-hydrocortisone gel twice a day as needed.  Take metamucil fiber and miralax to have regular soft stools.  Use dulcolax suppository as needed for constipation.    If you have worsening symptoms including increased pain, bleeding, fever, or other concerns, return to the emergency department for re-evaluation.

## 2022-09-23 NOTE — ED TRIAGE NOTES
Pt thinks she may have hemorrhoids. Having pain today an yesterday, unable to have a bowel movement r/t pain.    Pain with sitting as well, itching and swelling from rectum to vagina.

## 2022-09-24 LAB
C TRACH DNA SPEC QL NAA+PROBE: NEGATIVE
N GONORRHOEA DNA SPEC QL NAA+PROBE: NEGATIVE

## 2023-06-09 ENCOUNTER — MEDICAL CORRESPONDENCE (OUTPATIENT)
Dept: HEALTH INFORMATION MANAGEMENT | Facility: CLINIC | Age: 33
End: 2023-06-09
Payer: COMMERCIAL

## 2023-06-09 ENCOUNTER — TRANSCRIBE ORDERS (OUTPATIENT)
Dept: OTHER | Age: 33
End: 2023-06-09

## 2023-06-09 DIAGNOSIS — H81.13 BENIGN PAROXYSMAL POSITIONAL VERTIGO DUE TO BILATERAL VESTIBULAR DISORDER: Primary | ICD-10-CM

## 2023-10-22 ENCOUNTER — APPOINTMENT (OUTPATIENT)
Dept: CT IMAGING | Facility: CLINIC | Age: 33
End: 2023-10-22
Attending: INTERNAL MEDICINE
Payer: COMMERCIAL

## 2023-10-22 ENCOUNTER — HOSPITAL ENCOUNTER (EMERGENCY)
Facility: CLINIC | Age: 33
Discharge: HOME | End: 2023-10-22
Attending: INTERNAL MEDICINE | Admitting: INTERNAL MEDICINE
Payer: COMMERCIAL

## 2023-10-22 VITALS
HEART RATE: 56 BPM | TEMPERATURE: 98 F | SYSTOLIC BLOOD PRESSURE: 136 MMHG | DIASTOLIC BLOOD PRESSURE: 86 MMHG | OXYGEN SATURATION: 100 % | RESPIRATION RATE: 16 BRPM

## 2023-10-22 DIAGNOSIS — L02.219 CELLULITIS AND ABSCESS OF TRUNK: ICD-10-CM

## 2023-10-22 DIAGNOSIS — L03.319 CELLULITIS AND ABSCESS OF TRUNK: ICD-10-CM

## 2023-10-22 DIAGNOSIS — R42 DIZZINESS: ICD-10-CM

## 2023-10-22 LAB
ALBUMIN SERPL BCG-MCNC: 4.2 G/DL (ref 3.5–5.2)
ALBUMIN UR-MCNC: NEGATIVE MG/DL
ALP SERPL-CCNC: 83 U/L (ref 35–104)
ALT SERPL W P-5'-P-CCNC: 14 U/L (ref 0–50)
ANION GAP SERPL CALCULATED.3IONS-SCNC: 7 MMOL/L (ref 7–15)
APPEARANCE UR: CLEAR
AST SERPL W P-5'-P-CCNC: 22 U/L (ref 0–45)
BASO+EOS+MONOS # BLD AUTO: NORMAL 10*3/UL
BASO+EOS+MONOS NFR BLD AUTO: NORMAL %
BASOPHILS # BLD AUTO: 0 10E3/UL (ref 0–0.2)
BASOPHILS NFR BLD AUTO: 0 %
BILIRUB SERPL-MCNC: 0.5 MG/DL
BILIRUB UR QL STRIP: NEGATIVE
BUN SERPL-MCNC: 10 MG/DL (ref 6–20)
CALCIUM SERPL-MCNC: 9.1 MG/DL (ref 8.6–10)
CHLORIDE SERPL-SCNC: 103 MMOL/L (ref 98–107)
COLOR UR AUTO: ABNORMAL
CREAT SERPL-MCNC: 0.52 MG/DL (ref 0.51–0.95)
CRP SERPL-MCNC: <3 MG/L
DEPRECATED HCO3 PLAS-SCNC: 25 MMOL/L (ref 22–29)
EGFRCR SERPLBLD CKD-EPI 2021: >90 ML/MIN/1.73M2
EOSINOPHIL # BLD AUTO: 0.1 10E3/UL (ref 0–0.7)
EOSINOPHIL NFR BLD AUTO: 1 %
ERYTHROCYTE [DISTWIDTH] IN BLOOD BY AUTOMATED COUNT: 12.4 % (ref 10–15)
ERYTHROCYTE [SEDIMENTATION RATE] IN BLOOD BY WESTERGREN METHOD: 15 MM/HR (ref 0–20)
GLUCOSE SERPL-MCNC: 99 MG/DL (ref 70–99)
GLUCOSE UR STRIP-MCNC: NEGATIVE MG/DL
HCG SERPL QL: NEGATIVE
HCO3 BLDV-SCNC: 26 MMOL/L (ref 21–28)
HCT VFR BLD AUTO: 41.2 % (ref 35–47)
HGB BLD-MCNC: 13.7 G/DL (ref 11.7–15.7)
HGB UR QL STRIP: NEGATIVE
IMM GRANULOCYTES # BLD: 0 10E3/UL
IMM GRANULOCYTES NFR BLD: 0 %
INR PPP: 1.04 (ref 0.85–1.15)
KETONES UR STRIP-MCNC: NEGATIVE MG/DL
LACTATE BLD-SCNC: 0.7 MMOL/L
LEUKOCYTE ESTERASE UR QL STRIP: NEGATIVE
LYMPHOCYTES # BLD AUTO: 2.5 10E3/UL (ref 0.8–5.3)
LYMPHOCYTES NFR BLD AUTO: 24 %
MCH RBC QN AUTO: 30 PG (ref 26.5–33)
MCHC RBC AUTO-ENTMCNC: 33.3 G/DL (ref 31.5–36.5)
MCV RBC AUTO: 90 FL (ref 78–100)
MONOCYTES # BLD AUTO: 0.8 10E3/UL (ref 0–1.3)
MONOCYTES NFR BLD AUTO: 8 %
MUCOUS THREADS #/AREA URNS LPF: PRESENT /LPF
NEUTROPHILS # BLD AUTO: 6.8 10E3/UL (ref 1.6–8.3)
NEUTROPHILS NFR BLD AUTO: 67 %
NITRATE UR QL: NEGATIVE
NRBC # BLD AUTO: 0 10E3/UL
NRBC BLD AUTO-RTO: 0 /100
PCO2 BLDV: 45 MM HG (ref 40–50)
PH BLDV: 7.37 [PH] (ref 7.32–7.43)
PH UR STRIP: 6.5 [PH] (ref 5–7)
PLATELET # BLD AUTO: 270 10E3/UL (ref 150–450)
PO2 BLDV: 34 MM HG (ref 25–47)
POTASSIUM SERPL-SCNC: 4.3 MMOL/L (ref 3.4–5.3)
PROT SERPL-MCNC: 7.3 G/DL (ref 6.4–8.3)
RBC # BLD AUTO: 4.56 10E6/UL (ref 3.8–5.2)
RBC URINE: <1 /HPF
SAO2 % BLDV: 64 % (ref 94–100)
SODIUM SERPL-SCNC: 135 MMOL/L (ref 135–145)
SP GR UR STRIP: 1.01 (ref 1–1.03)
SQUAMOUS EPITHELIAL: 4 /HPF
TROPONIN T SERPL HS-MCNC: <6 NG/L
UROBILINOGEN UR STRIP-MCNC: NORMAL MG/DL
WBC # BLD AUTO: 10.2 10E3/UL (ref 4–11)
WBC URINE: 1 /HPF

## 2023-10-22 PROCEDURE — 86140 C-REACTIVE PROTEIN: CPT | Performed by: INTERNAL MEDICINE

## 2023-10-22 PROCEDURE — 10060 I&D ABSCESS SIMPLE/SINGLE: CPT | Performed by: INTERNAL MEDICINE

## 2023-10-22 PROCEDURE — 258N000003 HC RX IP 258 OP 636: Performed by: INTERNAL MEDICINE

## 2023-10-22 PROCEDURE — 83605 ASSAY OF LACTIC ACID: CPT

## 2023-10-22 PROCEDURE — 96366 THER/PROPH/DIAG IV INF ADDON: CPT | Mod: 59 | Performed by: INTERNAL MEDICINE

## 2023-10-22 PROCEDURE — 84484 ASSAY OF TROPONIN QUANT: CPT | Performed by: INTERNAL MEDICINE

## 2023-10-22 PROCEDURE — 99285 EMERGENCY DEPT VISIT HI MDM: CPT | Mod: 25 | Performed by: INTERNAL MEDICINE

## 2023-10-22 PROCEDURE — 80053 COMPREHEN METABOLIC PANEL: CPT | Performed by: INTERNAL MEDICINE

## 2023-10-22 PROCEDURE — 96365 THER/PROPH/DIAG IV INF INIT: CPT | Mod: 59 | Performed by: INTERNAL MEDICINE

## 2023-10-22 PROCEDURE — 93005 ELECTROCARDIOGRAM TRACING: CPT | Mod: XU | Performed by: INTERNAL MEDICINE

## 2023-10-22 PROCEDURE — 70450 CT HEAD/BRAIN W/O DYE: CPT

## 2023-10-22 PROCEDURE — 85025 COMPLETE CBC W/AUTO DIFF WBC: CPT | Performed by: INTERNAL MEDICINE

## 2023-10-22 PROCEDURE — 81001 URINALYSIS AUTO W/SCOPE: CPT | Performed by: INTERNAL MEDICINE

## 2023-10-22 PROCEDURE — 76642 ULTRASOUND BREAST LIMITED: CPT | Mod: 26 | Performed by: INTERNAL MEDICINE

## 2023-10-22 PROCEDURE — 82803 BLOOD GASES ANY COMBINATION: CPT

## 2023-10-22 PROCEDURE — 96375 TX/PRO/DX INJ NEW DRUG ADDON: CPT | Mod: 59 | Performed by: INTERNAL MEDICINE

## 2023-10-22 PROCEDURE — 93010 ELECTROCARDIOGRAM REPORT: CPT | Mod: 59 | Performed by: INTERNAL MEDICINE

## 2023-10-22 PROCEDURE — 99284 EMERGENCY DEPT VISIT MOD MDM: CPT | Mod: 25 | Performed by: INTERNAL MEDICINE

## 2023-10-22 PROCEDURE — 85610 PROTHROMBIN TIME: CPT | Performed by: INTERNAL MEDICINE

## 2023-10-22 PROCEDURE — 85652 RBC SED RATE AUTOMATED: CPT | Performed by: INTERNAL MEDICINE

## 2023-10-22 PROCEDURE — 76642 ULTRASOUND BREAST LIMITED: CPT

## 2023-10-22 PROCEDURE — 36415 COLL VENOUS BLD VENIPUNCTURE: CPT | Performed by: INTERNAL MEDICINE

## 2023-10-22 PROCEDURE — 84703 CHORIONIC GONADOTROPIN ASSAY: CPT | Performed by: INTERNAL MEDICINE

## 2023-10-22 PROCEDURE — 250N000011 HC RX IP 250 OP 636: Mod: JZ | Performed by: INTERNAL MEDICINE

## 2023-10-22 RX ORDER — METOCLOPRAMIDE 5 MG/1
5 TABLET ORAL 4 TIMES DAILY PRN
Qty: 10 TABLET | Refills: 0 | Status: SHIPPED | OUTPATIENT
Start: 2023-10-22 | End: 2024-01-19

## 2023-10-22 RX ORDER — CEPHALEXIN 500 MG/1
500 CAPSULE ORAL 4 TIMES DAILY
Qty: 28 CAPSULE | Refills: 0 | Status: SHIPPED | OUTPATIENT
Start: 2023-10-22 | End: 2023-10-29

## 2023-10-22 RX ORDER — METOCLOPRAMIDE HYDROCHLORIDE 5 MG/ML
5 INJECTION INTRAMUSCULAR; INTRAVENOUS ONCE
Status: COMPLETED | OUTPATIENT
Start: 2023-10-22 | End: 2023-10-22

## 2023-10-22 RX ORDER — CEFAZOLIN SODIUM 1 G/3ML
1 INJECTION, POWDER, FOR SOLUTION INTRAMUSCULAR; INTRAVENOUS ONCE
Status: COMPLETED | OUTPATIENT
Start: 2023-10-22 | End: 2023-10-22

## 2023-10-22 RX ORDER — KETOROLAC TROMETHAMINE 30 MG/ML
30 INJECTION, SOLUTION INTRAMUSCULAR; INTRAVENOUS ONCE
Status: COMPLETED | OUTPATIENT
Start: 2023-10-22 | End: 2023-10-22

## 2023-10-22 RX ADMIN — METOCLOPRAMIDE HYDROCHLORIDE 5 MG: 5 INJECTION INTRAMUSCULAR; INTRAVENOUS at 13:31

## 2023-10-22 RX ADMIN — KETOROLAC TROMETHAMINE 30 MG: 30 INJECTION, SOLUTION INTRAMUSCULAR; INTRAVENOUS at 13:31

## 2023-10-22 RX ADMIN — CEFAZOLIN 1 G: 1 INJECTION, POWDER, FOR SOLUTION INTRAMUSCULAR; INTRAVENOUS at 13:31

## 2023-10-22 RX ADMIN — SODIUM CHLORIDE 1000 ML: 9 INJECTION, SOLUTION INTRAVENOUS at 13:31

## 2023-10-22 ASSESSMENT — ACTIVITIES OF DAILY LIVING (ADL)
ADLS_ACUITY_SCORE: 35
ADLS_ACUITY_SCORE: 33

## 2023-10-22 NOTE — ED PROVIDER NOTES
ED Provider Note  [facility]      History     Chief Complaint   Patient presents with    Dizziness    Nausea     Complaining of dizziness and nausea and sore on her left hand ( hx 3days). States she feels like she's moving even when she's sitting.      HPI  Heike Musa is a 33 year old female PMH of vertiginous migraine, who presents to the ER for evaluation of dizziness and nausea.    Patient reports she is having nausea, dizziness, weakness ongoing for a week and worsening.  No fever, no diarrhea.  She is not limited by her left breast which she noticed 3 days ago, cannot move that without pain to the area.  been 1 week since her last period.    Past Medical History  Past Medical History:   Diagnosis Date    Migraines      No past surgical history on file.  acetaminophen (TYLENOL) 325 MG tablet  benzocaine-menthol (DERMOPLAST) 20-0.5 % AERO  bisacodyl (DULCOLAX) 10 MG suppository  cephALEXin (KEFLEX) 500 MG capsule  cephALEXin (KEFLEX) 500 MG capsule  diclofenac (VOLTAREN) 1 % topical gel  ibuprofen (ADVIL/MOTRIN) 800 MG tablet  metoclopramide (REGLAN) 5 MG tablet  metoclopramide (REGLAN) 5 MG tablet  phenylephrine-shark liver oil-mineral oil-petrolatum (PREPARATION H) 0.25-14-74.9 % rectal ointment  Prenatal Vit-Fe Fumarate-FA (PRENATAL MULTIVITAMIN W/IRON) 27-0.8 MG tablet  psyllium (METAMUCIL SMOOTH TEXTURE) 28 % packet      No Known Allergies  Family History  Family History   Problem Relation Age of Onset    Hypertension Mother      Social History   Social History     Tobacco Use    Smoking status: Never    Smokeless tobacco: Never   Substance Use Topics    Alcohol use: No    Drug use: No         A medically appropriate review of systems was performed with pertinent positives and negatives noted in the HPI, and all other systems negative.    Physical Exam   BP: 136/86  Pulse: 56  Temp: 98  F (36.7  C)  Resp: 16  SpO2: 100 %  Physical Exam  Vitals and nursing note reviewed.    Constitutional:       General: She is not in acute distress.     Appearance: Normal appearance. She is not diaphoretic.   HENT:      Head: Normocephalic and atraumatic.      Mouth/Throat:      Mouth: Mucous membranes are moist.   Eyes:      General: No scleral icterus.     Extraocular Movements: Extraocular movements intact.      Conjunctiva/sclera: Conjunctivae normal.   Cardiovascular:      Rate and Rhythm: Normal rate.      Heart sounds: Normal heart sounds.   Pulmonary:      Effort: Pulmonary effort is normal. No respiratory distress.      Breath sounds: Normal breath sounds.   Abdominal:      General: Abdomen is flat.      Palpations: Abdomen is soft.      Tenderness: There is no abdominal tenderness.   Musculoskeletal:         General: No tenderness. Normal range of motion.      Cervical back: Normal range of motion and neck supple.   Skin:     General: Skin is warm.      Findings: No rash.             Comments: Red swollen tender lesion consistent with abscess/cellulitis   Neurological:      Mental Status: She is alert.           ED Course, Procedures, & Data      [facility]    PROCEDURE: -Incision/Drainage    Date/Time: 10/22/2023 4:13 PM    Performed by: Cristian Green MD  Authorized by: Cristian Green MD    Risks, benefits and alternatives discussed.      LOCATION:      Type:  Abscess    Size:  0.5    Location:  Trunk    Trunk location:  L breast    PRE-PROCEDURE DETAILS:     Skin preparation:  Chloraprep    PROCEDURE TYPE:     Complexity:  Simple    ANESTHESIA (see MAR for exact dosages):     Anesthesia method:  Local infiltration    Local anesthetic:  Lidocaine 1% WITH epi    PROCEDURE DETAILS:     Incision types:  Stab incision    Incision depth:  Dermal    Scalpel blade:  11    Drainage:  Purulent    Drainage amount:  Scant    Wound treatment:  Wound left open    Packing materials:  None    PROCEDURE    Patient Tolerance:  Patient tolerated the  procedure well with no immediate complications    Results for orders placed during the hospital encounter of 10/22/23    POC US SOFT TISSUE    Impression  Limited Soft Tissue Ultrasound, performed and interpreted by me.    Indication:  Skin redness warmth pain. Evaluate for cellulitis vs abscess.    Body location: chest    Findings:  There is cobblestoning suggestive of cellulitis in the evaluated area. There is a fluid collection measuring 0.5x0.5 to suggest abscess.    IMPRESSION: Abscess and Cellulitis                Results for orders placed or performed during the hospital encounter of 10/22/23   POC US SOFT TISSUE     Status: None    Impression    Limited Soft Tissue Ultrasound, performed and interpreted by me.    Indication:  Skin redness warmth pain. Evaluate for cellulitis vs abscess.     Body location: chest    Findings:  There is cobblestoning suggestive of cellulitis in the evaluated area. There is a fluid collection measuring 0.5x0.5 to suggest abscess.      IMPRESSION: Abscess and Cellulitis          CT Head w/o Contrast     Status: None    Narrative    EXAM: CT HEAD W/O CONTRAST  LOCATION: [facility]  DATE: 10/22/2023    INDICATION: dizziness  COMPARISON: None.  TECHNIQUE: Routine CT Head without IV contrast. Multiplanar reformats. Dose reduction techniques were used.    FINDINGS:  INTRACRANIAL CONTENTS: No intracranial hemorrhage, extraaxial collection, or mass effect.  No CT evidence of acute infarct. Normal parenchymal attenuation. Normal ventricles and sulci.     VISUALIZED ORBITS/SINUSES/MASTOIDS: No intraorbital abnormality. No paranasal sinus mucosal disease. No middle ear or mastoid effusion.    BONES/SOFT TISSUES: No acute abnormality.      Impression    IMPRESSION:  1.  Unremarkable head CT with no acute intracranial abnormality.   INR     Status: Normal   Result Value Ref Range    INR 1.04 0.85 - 1.15   Comprehensive metabolic panel     Status:  Normal   Result Value Ref Range    Sodium 135 135 - 145 mmol/L    Potassium 4.3 3.4 - 5.3 mmol/L    Carbon Dioxide (CO2) 25 22 - 29 mmol/L    Anion Gap 7 7 - 15 mmol/L    Urea Nitrogen 10.0 6.0 - 20.0 mg/dL    Creatinine 0.52 0.51 - 0.95 mg/dL    GFR Estimate >90 >60 mL/min/1.73m2    Calcium 9.1 8.6 - 10.0 mg/dL    Chloride 103 98 - 107 mmol/L    Glucose 99 70 - 99 mg/dL    Alkaline Phosphatase 83 35 - 104 U/L    AST 22 0 - 45 U/L    ALT 14 0 - 50 U/L    Protein Total 7.3 6.4 - 8.3 g/dL    Albumin 4.2 3.5 - 5.2 g/dL    Bilirubin Total 0.5 <=1.2 mg/dL   CRP inflammation     Status: Normal   Result Value Ref Range    CRP Inflammation <3.00 <5.00 mg/L   Erythrocyte sedimentation rate auto     Status: Normal   Result Value Ref Range    Erythrocyte Sedimentation Rate 15 0 - 20 mm/hr   HCG qualitative Blood     Status: Normal   Result Value Ref Range    hCG Serum Qualitative Negative Negative   UA with Microscopic reflex to Culture     Status: Abnormal    Specimen: Urine, Midstream   Result Value Ref Range    Color Urine Straw Colorless, Straw, Light Yellow, Yellow    Appearance Urine Clear Clear    Glucose Urine Negative Negative mg/dL    Bilirubin Urine Negative Negative    Ketones Urine Negative Negative mg/dL    Specific Gravity Urine 1.009 1.003 - 1.035    Blood Urine Negative Negative    pH Urine 6.5 5.0 - 7.0    Protein Albumin Urine Negative Negative mg/dL    Urobilinogen Urine Normal Normal, 2.0 mg/dL    Nitrite Urine Negative Negative    Leukocyte Esterase Urine Negative Negative    Mucus Urine Present (A) None Seen /LPF    RBC Urine <1 <=2 /HPF    WBC Urine 1 <=5 /HPF    Squamous Epithelials Urine 4 (H) <=1 /HPF    Narrative    Urine Culture not indicated   CBC with platelets and differential     Status: None   Result Value Ref Range    WBC Count 10.2 4.0 - 11.0 10e3/uL    RBC Count 4.56 3.80 - 5.20 10e6/uL    Hemoglobin 13.7 11.7 - 15.7 g/dL    Hematocrit 41.2 35.0 - 47.0 %    MCV 90 78 - 100 fL    MCH 30.0  26.5 - 33.0 pg    MCHC 33.3 31.5 - 36.5 g/dL    RDW 12.4 10.0 - 15.0 %    Platelet Count 270 150 - 450 10e3/uL    % Neutrophils 67 %    % Lymphocytes 24 %    % Monocytes 8 %    Mids % (Monos, Eos, Basos)      % Eosinophils 1 %    % Basophils 0 %    % Immature Granulocytes 0 %    NRBCs per 100 WBC 0 <1 /100    Absolute Neutrophils 6.8 1.6 - 8.3 10e3/uL    Absolute Lymphocytes 2.5 0.8 - 5.3 10e3/uL    Absolute Monocytes 0.8 0.0 - 1.3 10e3/uL    Mids Abs (Monos, Eos, Basos)      Absolute Eosinophils 0.1 0.0 - 0.7 10e3/uL    Absolute Basophils 0.0 0.0 - 0.2 10e3/uL    Absolute Immature Granulocytes 0.0 <=0.4 10e3/uL    Absolute NRBCs 0.0 10e3/uL   iStat Gases (lactate) venous, POCT     Status: Abnormal   Result Value Ref Range    Lactic Acid POCT 0.7 <=2.0 mmol/L    Bicarbonate Venous POCT 26 21 - 28 mmol/L    O2 Sat, Venous POCT 64 (L) 94 - 100 %    pCO2 Venous POCT 45 40 - 50 mm Hg    pH Venous POCT 7.37 7.32 - 7.43    pO2 Venous POCT 34 25 - 47 mm Hg   Troponin T, High Sensitivity     Status: Normal   Result Value Ref Range    Troponin T, High Sensitivity <6 <=14 ng/L   EKG 12-lead, tracing only     Status: None (Preliminary result)   Result Value Ref Range    Systolic Blood Pressure  mmHg    Diastolic Blood Pressure  mmHg    Ventricular Rate 50 BPM    Atrial Rate 50 BPM    ND Interval 176 ms    QRS Duration 88 ms     ms    QTc 404 ms    P Axis 4 degrees    R AXIS -8 degrees    T Axis 7 degrees    Interpretation ECG       Sinus bradycardia  Nonspecific T wave abnormality  Abnormal ECG     CBC with platelets differential     Status: None    Narrative    The following orders were created for panel order CBC with platelets differential.  Procedure                               Abnormality         Status                     ---------                               -----------         ------                     CBC with platelets and d...[[id number]]                      Final result                 Please view  results for these tests on the individual orders.     Medications   pharmacy alert - intermittent dosing (has no administration in time range)   sodium chloride 0.9% BOLUS 1,000 mL (0 mLs Intravenous Stopped 10/22/23 1547)   ketorolac (TORADOL) injection 30 mg (30 mg Intravenous $Given 10/22/23 1331)   metoclopramide (REGLAN) injection 5 mg (5 mg Intravenous $Given 10/22/23 1331)   ceFAZolin (ANCEF) 1 g vial to attach to  ml bag for ADULT or 50 ml bag for PEDS (0 g Intravenous Stopped 10/22/23 1547)     Labs Ordered and Resulted from Time of ED Arrival to Time of ED Departure   ROUTINE UA WITH MICROSCOPIC REFLEX TO CULTURE - Abnormal       Result Value    Color Urine Straw      Appearance Urine Clear      Glucose Urine Negative      Bilirubin Urine Negative      Ketones Urine Negative      Specific Gravity Urine 1.009      Blood Urine Negative      pH Urine 6.5      Protein Albumin Urine Negative      Urobilinogen Urine Normal      Nitrite Urine Negative      Leukocyte Esterase Urine Negative      Mucus Urine Present (*)     RBC Urine <1      WBC Urine 1      Squamous Epithelials Urine 4 (*)    ISTAT GASES LACTATE VENOUS POCT - Abnormal    Lactic Acid POCT 0.7      Bicarbonate Venous POCT 26      O2 Sat, Venous POCT 64 (*)     pCO2 Venous POCT 45      pH Venous POCT 7.37      pO2 Venous POCT 34     INR - Normal    INR 1.04     COMPREHENSIVE METABOLIC PANEL - Normal    Sodium 135      Potassium 4.3      Carbon Dioxide (CO2) 25      Anion Gap 7      Urea Nitrogen 10.0      Creatinine 0.52      GFR Estimate >90      Calcium 9.1      Chloride 103      Glucose 99      Alkaline Phosphatase 83      AST 22      ALT 14      Protein Total 7.3      Albumin 4.2      Bilirubin Total 0.5     CRP INFLAMMATION - Normal    CRP Inflammation <3.00     ERYTHROCYTE SEDIMENTATION RATE AUTO - Normal    Erythrocyte Sedimentation Rate 15     HCG QUALITATIVE PREGNANCY - Normal    hCG Serum Qualitative Negative     TROPONIN T, HIGH  SENSITIVITY - Normal    Troponin T, High Sensitivity <6     CBC WITH PLATELETS AND DIFFERENTIAL    WBC Count 10.2      RBC Count 4.56      Hemoglobin 13.7      Hematocrit 41.2      MCV 90      MCH 30.0      MCHC 33.3      RDW 12.4      Platelet Count 270      % Neutrophils 67      % Lymphocytes 24      % Monocytes 8      Mids % (Monos, Eos, Basos)        % Eosinophils 1      % Basophils 0      % Immature Granulocytes 0      NRBCs per 100 WBC 0      Absolute Neutrophils 6.8      Absolute Lymphocytes 2.5      Absolute Monocytes 0.8      Mids Abs (Monos, Eos, Basos)        Absolute Eosinophils 0.1      Absolute Basophils 0.0      Absolute Immature Granulocytes 0.0      Absolute NRBCs 0.0       CT Head w/o Contrast   Final Result   IMPRESSION:   1.  Unremarkable head CT with no acute intracranial abnormality.      POC US SOFT TISSUE   Final Result   Limited Soft Tissue Ultrasound, performed and interpreted by me.      Indication:  Skin redness warmth pain. Evaluate for cellulitis vs abscess.       Body location: chest      Findings:  There is cobblestoning suggestive of cellulitis in the evaluated area. There is a fluid collection measuring 0.5x0.5 to suggest abscess.        IMPRESSION: Abscess and Cellulitis                       Critical care was not performed.     Medical Decision Making  The patient's presentation was of moderate complexity (an acute illness with systemic symptoms).    The patient's evaluation involved:  ordering and/or review of 3+ test(s) in this encounter (see separate area of note for details)    The patient's management necessitated moderate risk (prescription drug management including medications given in the ED).    Assessment & Plan    Acute dizziess and nause with left chest lesion consistent with abscess/cellulitis, CT EKG and labs including trop neg, no suggestion of infection other than left chest lesion-I and D'ed with pus out, Ancef started, improving with toradol and reglan, will  discharge with keflex and reglan, follow up with her PMD in one week.    I have reviewed the nursing notes. I have reviewed the findings, diagnosis, plan and need for follow up with the patient.    Discharge Medication List as of 10/22/2023  3:52 PM        START taking these medications    Details   cephALEXin (KEFLEX) 500 MG capsule Take 1 capsule (500 mg) by mouth 4 times daily for 7 days, Disp-28 capsule, R-0, E-Prescribe      metoclopramide (REGLAN) 5 MG tablet Take 1 tablet (5 mg) by mouth 4 times daily as needed (nausea dizziness), Disp-10 tablet, R-0, E-Prescribe             Final diagnoses:   Dizziness   Cellulitis and abscess of trunk     I, Bernadette Best, am serving as a trained medical scribe to document services personally performed by Cristian Perdomo Md, MD, based on the provider's statements to me.     I, Cristian Perdomo Md, MD, was physically present and have reviewed and verified the accuracy of this note documented by Bernadette Best.    CRISTIAN PERDOMO md  [facility] EMERGENCY DEPARTMENT  10/22/2023     Cristian Perdomo MD  10/22/23 8203

## 2023-10-22 NOTE — ED TRIAGE NOTES
Complaining of dizziness and nausea and sore on her left hand ( hx 3days). States she feels like she's moving even when she's sitting.

## 2023-10-23 LAB
ATRIAL RATE - MUSE: 50 BPM
DIASTOLIC BLOOD PRESSURE - MUSE: NORMAL MMHG
INTERPRETATION ECG - MUSE: NORMAL
P AXIS - MUSE: 4 DEGREES
PR INTERVAL - MUSE: 176 MS
QRS DURATION - MUSE: 88 MS
QT - MUSE: 444 MS
QTC - MUSE: 404 MS
R AXIS - MUSE: -8 DEGREES
SYSTOLIC BLOOD PRESSURE - MUSE: NORMAL MMHG
T AXIS - MUSE: 7 DEGREES
VENTRICULAR RATE- MUSE: 50 BPM

## 2023-11-16 ENCOUNTER — LAB REQUISITION (OUTPATIENT)
Dept: LAB | Facility: CLINIC | Age: 33
End: 2023-11-16
Payer: COMMERCIAL

## 2023-11-16 DIAGNOSIS — R10.13 EPIGASTRIC PAIN: ICD-10-CM

## 2023-11-16 LAB
ALBUMIN SERPL BCG-MCNC: 4.2 G/DL (ref 3.5–5.2)
ALP SERPL-CCNC: 77 U/L (ref 40–150)
ALT SERPL W P-5'-P-CCNC: 16 U/L (ref 0–50)
ANION GAP SERPL CALCULATED.3IONS-SCNC: 12 MMOL/L (ref 7–15)
AST SERPL W P-5'-P-CCNC: 16 U/L (ref 0–45)
BILIRUB SERPL-MCNC: 0.5 MG/DL
BUN SERPL-MCNC: 9.3 MG/DL (ref 6–20)
CALCIUM SERPL-MCNC: 9.3 MG/DL (ref 8.6–10)
CHLORIDE SERPL-SCNC: 103 MMOL/L (ref 98–107)
CREAT SERPL-MCNC: 0.53 MG/DL (ref 0.51–0.95)
DEPRECATED HCO3 PLAS-SCNC: 24 MMOL/L (ref 22–29)
EGFRCR SERPLBLD CKD-EPI 2021: >90 ML/MIN/1.73M2
GLUCOSE SERPL-MCNC: 112 MG/DL (ref 70–99)
HCV AB SERPL QL IA: NONREACTIVE
HIV 1+2 AB+HIV1 P24 AG SERPL QL IA: NONREACTIVE
POTASSIUM SERPL-SCNC: 3.8 MMOL/L (ref 3.4–5.3)
PROT SERPL-MCNC: 7.4 G/DL (ref 6.4–8.3)
SODIUM SERPL-SCNC: 139 MMOL/L (ref 135–145)
T PALLIDUM AB SER QL: NONREACTIVE

## 2023-11-16 PROCEDURE — 80053 COMPREHEN METABOLIC PANEL: CPT | Mod: ORL | Performed by: PEDIATRICS

## 2023-11-16 PROCEDURE — 87491 CHLMYD TRACH DNA AMP PROBE: CPT | Mod: ORL | Performed by: PEDIATRICS

## 2023-11-16 PROCEDURE — 86803 HEPATITIS C AB TEST: CPT | Mod: ORL | Performed by: PEDIATRICS

## 2023-11-16 PROCEDURE — 87389 HIV-1 AG W/HIV-1&-2 AB AG IA: CPT | Mod: ORL | Performed by: PEDIATRICS

## 2023-11-16 PROCEDURE — 87591 N.GONORRHOEAE DNA AMP PROB: CPT | Mod: ORL | Performed by: PEDIATRICS

## 2023-11-16 PROCEDURE — 86780 TREPONEMA PALLIDUM: CPT | Mod: ORL | Performed by: PEDIATRICS

## 2023-11-17 LAB
C TRACH DNA SPEC QL NAA+PROBE: NEGATIVE
N GONORRHOEA DNA SPEC QL NAA+PROBE: NEGATIVE

## 2023-12-23 ENCOUNTER — APPOINTMENT (OUTPATIENT)
Dept: ULTRASOUND IMAGING | Facility: CLINIC | Age: 33
End: 2023-12-23
Attending: PHYSICIAN ASSISTANT
Payer: COMMERCIAL

## 2023-12-23 ENCOUNTER — HOSPITAL ENCOUNTER (EMERGENCY)
Facility: CLINIC | Age: 33
Discharge: HOME | End: 2023-12-23
Attending: EMERGENCY MEDICINE | Admitting: EMERGENCY MEDICINE
Payer: COMMERCIAL

## 2023-12-23 VITALS
WEIGHT: 158.1 LBS | TEMPERATURE: 98.2 F | SYSTOLIC BLOOD PRESSURE: 127 MMHG | HEIGHT: 65 IN | BODY MASS INDEX: 26.34 KG/M2 | HEART RATE: 69 BPM | DIASTOLIC BLOOD PRESSURE: 82 MMHG | RESPIRATION RATE: 18 BRPM | OXYGEN SATURATION: 100 %

## 2023-12-23 DIAGNOSIS — O21.9 NAUSEA AND VOMITING IN PREGNANCY: ICD-10-CM

## 2023-12-23 LAB
ALBUMIN SERPL BCG-MCNC: 3.8 G/DL (ref 3.5–5.2)
ALBUMIN UR-MCNC: NEGATIVE MG/DL
ALP SERPL-CCNC: 74 U/L (ref 40–150)
ALT SERPL W P-5'-P-CCNC: 10 U/L (ref 0–50)
ANION GAP SERPL CALCULATED.3IONS-SCNC: 12 MMOL/L (ref 7–15)
APPEARANCE UR: CLEAR
AST SERPL W P-5'-P-CCNC: 18 U/L (ref 0–45)
BASOPHILS # BLD AUTO: 0 10E3/UL (ref 0–0.2)
BASOPHILS NFR BLD AUTO: 0 %
BILIRUB SERPL-MCNC: 0.3 MG/DL
BILIRUB UR QL STRIP: NEGATIVE
BUN SERPL-MCNC: 8.6 MG/DL (ref 6–20)
CALCIUM SERPL-MCNC: 9.4 MG/DL (ref 8.6–10)
CHLORIDE SERPL-SCNC: 101 MMOL/L (ref 98–107)
COLOR UR AUTO: ABNORMAL
CREAT SERPL-MCNC: 0.48 MG/DL (ref 0.51–0.95)
DEPRECATED HCO3 PLAS-SCNC: 22 MMOL/L (ref 22–29)
EGFRCR SERPLBLD CKD-EPI 2021: >90 ML/MIN/1.73M2
EOSINOPHIL # BLD AUTO: 0 10E3/UL (ref 0–0.7)
EOSINOPHIL NFR BLD AUTO: 0 %
ERYTHROCYTE [DISTWIDTH] IN BLOOD BY AUTOMATED COUNT: 12.1 % (ref 10–15)
GLUCOSE SERPL-MCNC: 97 MG/DL (ref 70–99)
GLUCOSE UR STRIP-MCNC: NEGATIVE MG/DL
HCG INTACT+B SERPL-ACNC: ABNORMAL MIU/ML
HCT VFR BLD AUTO: 40.6 % (ref 35–47)
HGB BLD-MCNC: 13.6 G/DL (ref 11.7–15.7)
HGB UR QL STRIP: NEGATIVE
IMM GRANULOCYTES # BLD: 0.1 10E3/UL
IMM GRANULOCYTES NFR BLD: 0 %
KETONES UR STRIP-MCNC: NEGATIVE MG/DL
LEUKOCYTE ESTERASE UR QL STRIP: NEGATIVE
LYMPHOCYTES # BLD AUTO: 2.6 10E3/UL (ref 0.8–5.3)
LYMPHOCYTES NFR BLD AUTO: 18 %
MCH RBC QN AUTO: 29.8 PG (ref 26.5–33)
MCHC RBC AUTO-ENTMCNC: 33.5 G/DL (ref 31.5–36.5)
MCV RBC AUTO: 89 FL (ref 78–100)
MONOCYTES # BLD AUTO: 1.1 10E3/UL (ref 0–1.3)
MONOCYTES NFR BLD AUTO: 8 %
MUCOUS THREADS #/AREA URNS LPF: PRESENT /LPF
NEUTROPHILS # BLD AUTO: 10.4 10E3/UL (ref 1.6–8.3)
NEUTROPHILS NFR BLD AUTO: 74 %
NITRATE UR QL: NEGATIVE
NRBC # BLD AUTO: 0 10E3/UL
NRBC BLD AUTO-RTO: 0 /100
PH UR STRIP: 5.5 [PH] (ref 5–7)
PLATELET # BLD AUTO: 236 10E3/UL (ref 150–450)
POTASSIUM SERPL-SCNC: 4.3 MMOL/L (ref 3.4–5.3)
PROT SERPL-MCNC: 6.9 G/DL (ref 6.4–8.3)
RBC # BLD AUTO: 4.57 10E6/UL (ref 3.8–5.2)
RBC URINE: 0 /HPF
SODIUM SERPL-SCNC: 135 MMOL/L (ref 135–145)
SP GR UR STRIP: 1.01 (ref 1–1.03)
SQUAMOUS EPITHELIAL: 3 /HPF
TRANSITIONAL EPI: <1 /HPF
UROBILINOGEN UR STRIP-MCNC: NORMAL MG/DL
WBC # BLD AUTO: 14.2 10E3/UL (ref 4–11)
WBC URINE: 1 /HPF

## 2023-12-23 PROCEDURE — 99285 EMERGENCY DEPT VISIT HI MDM: CPT | Mod: 25 | Performed by: EMERGENCY MEDICINE

## 2023-12-23 PROCEDURE — 99284 EMERGENCY DEPT VISIT MOD MDM: CPT | Mod: FS | Performed by: EMERGENCY MEDICINE

## 2023-12-23 PROCEDURE — 96374 THER/PROPH/DIAG INJ IV PUSH: CPT | Performed by: EMERGENCY MEDICINE

## 2023-12-23 PROCEDURE — 250N000011 HC RX IP 250 OP 636: Performed by: PHYSICIAN ASSISTANT

## 2023-12-23 PROCEDURE — 80053 COMPREHEN METABOLIC PANEL: CPT | Performed by: PHYSICIAN ASSISTANT

## 2023-12-23 PROCEDURE — 250N000013 HC RX MED GY IP 250 OP 250 PS 637: Performed by: PHYSICIAN ASSISTANT

## 2023-12-23 PROCEDURE — 81001 URINALYSIS AUTO W/SCOPE: CPT | Performed by: PHYSICIAN ASSISTANT

## 2023-12-23 PROCEDURE — 85025 COMPLETE CBC W/AUTO DIFF WBC: CPT | Performed by: PHYSICIAN ASSISTANT

## 2023-12-23 PROCEDURE — 96361 HYDRATE IV INFUSION ADD-ON: CPT | Performed by: EMERGENCY MEDICINE

## 2023-12-23 PROCEDURE — 258N000003 HC RX IP 258 OP 636: Performed by: PHYSICIAN ASSISTANT

## 2023-12-23 PROCEDURE — 84702 CHORIONIC GONADOTROPIN TEST: CPT | Performed by: PHYSICIAN ASSISTANT

## 2023-12-23 PROCEDURE — 76801 OB US < 14 WKS SINGLE FETUS: CPT

## 2023-12-23 PROCEDURE — 36415 COLL VENOUS BLD VENIPUNCTURE: CPT | Performed by: PHYSICIAN ASSISTANT

## 2023-12-23 RX ORDER — ONDANSETRON 2 MG/ML
4 INJECTION INTRAMUSCULAR; INTRAVENOUS ONCE
Status: COMPLETED | OUTPATIENT
Start: 2023-12-23 | End: 2023-12-23

## 2023-12-23 RX ORDER — PYRIDOXINE HCL (VITAMIN B6) 25 MG
25 TABLET ORAL ONCE
Status: COMPLETED | OUTPATIENT
Start: 2023-12-23 | End: 2023-12-23

## 2023-12-23 RX ADMIN — ONDANSETRON 4 MG: 2 INJECTION INTRAMUSCULAR; INTRAVENOUS at 20:00

## 2023-12-23 RX ADMIN — SODIUM CHLORIDE 1000 ML: 9 INJECTION, SOLUTION INTRAVENOUS at 19:59

## 2023-12-23 RX ADMIN — Medication 25 MG: at 20:51

## 2023-12-23 ASSESSMENT — ACTIVITIES OF DAILY LIVING (ADL): ADLS_ACUITY_SCORE: 35

## 2023-12-24 NOTE — DISCHARGE INSTRUCTIONS
You have a 7 week pregnancy.     It is a early pregnancy and needs time to see if it will grow normally.     Please follow up with your OB/GYN doctor.     Eat small, multiple meals.

## 2023-12-24 NOTE — ED PROVIDER NOTES
[facility] EMERGENCY DEPARTMENT ([facility])    23      ED PROVIDER NOTE        History     Chief Complaint   Patient presents with    Morning Sickness     Reports 4-5 weeks pregnant, reports nausea throughout the day, vomited 4 times today.      HPI  32yo  4w5d pregnant (by LMP 23) F no pmhx p/w nausea, vomiting and LLQ abd pain x 2 weeks.  She states that she feels that she becomes nauseous and vomits when she is attempting to take p.o.  No urinary symptoms, vaginal discharge or bleeding, fever, chills.  States that she had similar issues with nausea and vomiting during her first period; denies requiring hospitalization, but does note that she was not in [place] for her first pregnancy.  She has been seen by her PCP, with a positive UPT, but has not had any type of imaging.  She states that her PCP provided her with antiemetic, which she does not find helpful (does not remember name, presumably Zofran based on ODT description).    Past Medical History  Past Medical History:   Diagnosis Date    Migraines      History reviewed. No pertinent surgical history.  metoclopramide (REGLAN) 5 MG tablet  metoclopramide (REGLAN) 5 MG tablet  Prenatal Vit-Fe Fumarate-FA (PRENATAL MULTIVITAMIN W/IRON) 27-0.8 MG tablet  acetaminophen (TYLENOL) 325 MG tablet  benzocaine-menthol (DERMOPLAST) 20-0.5 % AERO  bisacodyl (DULCOLAX) 10 MG suppository  diclofenac (VOLTAREN) 1 % topical gel  ibuprofen (ADVIL/MOTRIN) 800 MG tablet  phenylephrine-shark liver oil-mineral oil-petrolatum (PREPARATION H) 0.25-14-74.9 % rectal ointment  psyllium (METAMUCIL SMOOTH TEXTURE) 28 % packet      No Known Allergies  Family History  Family History   Problem Relation Age of Onset    Hypertension Mother      Social History   Social History     Tobacco Use    Smoking status: Never    Smokeless tobacco: Never   Substance Use Topics    Alcohol use: No    Drug use: No         A medically appropriate review of systems was  "performed with pertinent positives and negatives noted in the HPI, and all other systems negative.    Physical Exam   BP: (!) 142/86  Pulse: 67  Temp: 98.5  F (36.9  C)  Resp: 18  Height: 165.1 cm (5' 5\")  Weight: 71.7 kg (158 lb 1.6 oz)  SpO2: 100 %  Physical Exam  Constitutional:       General: She is not in acute distress.     Appearance: Normal appearance. She is well-developed.   HENT:      Head: Normocephalic and atraumatic.   Eyes:      Conjunctiva/sclera: Conjunctivae normal.   Cardiovascular:      Rate and Rhythm: Normal rate.   Pulmonary:      Effort: Pulmonary effort is normal.   Abdominal:      General: Abdomen is flat.      Palpations: Abdomen is soft.      Tenderness: There is no abdominal tenderness.   Musculoskeletal:      Cervical back: Normal range of motion and neck supple.   Skin:     General: Skin is warm and dry.      Findings: No rash.   Neurological:      Mental Status: She is alert and oriented to person, place, and time.           ED Course, Procedures, & Data       Procedures                   Results for orders placed or performed during the hospital encounter of 12/23/23   HCG quantitative pregnancy     Status: Abnormal   Result Value Ref Range    hCG Quantitative 73,185 (H) <5 mIU/mL   Comprehensive metabolic panel     Status: Abnormal   Result Value Ref Range    Sodium 135 135 - 145 mmol/L    Potassium 4.3 3.4 - 5.3 mmol/L    Carbon Dioxide (CO2) 22 22 - 29 mmol/L    Anion Gap 12 7 - 15 mmol/L    Urea Nitrogen 8.6 6.0 - 20.0 mg/dL    Creatinine 0.48 (L) 0.51 - 0.95 mg/dL    GFR Estimate >90 >60 mL/min/1.73m2    Calcium 9.4 8.6 - 10.0 mg/dL    Chloride 101 98 - 107 mmol/L    Glucose 97 70 - 99 mg/dL    Alkaline Phosphatase 74 40 - 150 U/L    AST 18 0 - 45 U/L    ALT 10 0 - 50 U/L    Protein Total 6.9 6.4 - 8.3 g/dL    Albumin 3.8 3.5 - 5.2 g/dL    Bilirubin Total 0.3 <=1.2 mg/dL   CBC with platelets and differential     Status: Abnormal   Result Value Ref Range    WBC Count 14.2 (H) 4.0 " - 11.0 10e3/uL    RBC Count 4.57 3.80 - 5.20 10e6/uL    Hemoglobin 13.6 11.7 - 15.7 g/dL    Hematocrit 40.6 35.0 - 47.0 %    MCV 89 78 - 100 fL    MCH 29.8 26.5 - 33.0 pg    MCHC 33.5 31.5 - 36.5 g/dL    RDW 12.1 10.0 - 15.0 %    Platelet Count 236 150 - 450 10e3/uL    % Neutrophils 74 %    % Lymphocytes 18 %    % Monocytes 8 %    % Eosinophils 0 %    % Basophils 0 %    % Immature Granulocytes 0 %    NRBCs per 100 WBC 0 <1 /100    Absolute Neutrophils 10.4 (H) 1.6 - 8.3 10e3/uL    Absolute Lymphocytes 2.6 0.8 - 5.3 10e3/uL    Absolute Monocytes 1.1 0.0 - 1.3 10e3/uL    Absolute Eosinophils 0.0 0.0 - 0.7 10e3/uL    Absolute Basophils 0.0 0.0 - 0.2 10e3/uL    Absolute Immature Granulocytes 0.1 <=0.4 10e3/uL    Absolute NRBCs 0.0 10e3/uL   CBC with platelets differential     Status: Abnormal    Narrative    The following orders were created for panel order CBC with platelets differential.  Procedure                               Abnormality         Status                     ---------                               -----------         ------                     CBC with platelets and d...[[id number]]  Abnormal            Final result                 Please view results for these tests on the individual orders.     Medications   ondansetron (ZOFRAN) injection 4 mg (4 mg Intravenous $Given 12/23/23 2000)   pyridOXINE (VITAMIN B6) tablet 25 mg (25 mg Oral $Given 12/23/23 2051)   sodium chloride 0.9% BOLUS 1,000 mL (0 mLs Intravenous Stopped 12/23/23 2104)     Labs Ordered and Resulted from Time of ED Arrival to Time of ED Departure   HCG QUANTITATIVE PREGNANCY - Abnormal       Result Value    hCG Quantitative 73,185 (*)    COMPREHENSIVE METABOLIC PANEL - Abnormal    Sodium 135      Potassium 4.3      Carbon Dioxide (CO2) 22      Anion Gap 12      Urea Nitrogen 8.6      Creatinine 0.48 (*)     GFR Estimate >90      Calcium 9.4      Chloride 101      Glucose 97      Alkaline Phosphatase 74      AST 18      ALT 10      Protein  Total 6.9      Albumin 3.8      Bilirubin Total 0.3     CBC WITH PLATELETS AND DIFFERENTIAL - Abnormal    WBC Count 14.2 (*)     RBC Count 4.57      Hemoglobin 13.6      Hematocrit 40.6      MCV 89      MCH 29.8      MCHC 33.5      RDW 12.1      Platelet Count 236      % Neutrophils 74      % Lymphocytes 18      % Monocytes 8      % Eosinophils 0      % Basophils 0      % Immature Granulocytes 0      NRBCs per 100 WBC 0      Absolute Neutrophils 10.4 (*)     Absolute Lymphocytes 2.6      Absolute Monocytes 1.1      Absolute Eosinophils 0.0      Absolute Basophils 0.0      Absolute Immature Granulocytes 0.1      Absolute NRBCs 0.0     ROUTINE UA WITH MICROSCOPIC REFLEX TO CULTURE     US OB < 14 Weeks Single    (Results Pending)          Critical care was not performed.     Medical Decision Making  The patient's presentation was of high complexity (pregnant with high risk differential).    The patient's evaluation involved:  review of external note(s) from 3+ sources (clinic and ED)  review of 2 test result(s) ordered prior to this encounter (pregnancy, sono)  ordering and/or review of 3+ test(s) in this encounter (multiple labs and sono)    The patient's management necessitated further care after sign-out to Dr. Rios (see their note for further management).    Assessment & Plan    34yo  4w5d pregnant (by LMP 23) F no pmhx p/w nausea, vomiting and LLQ abd pain x 2 weeks.  In ED patient was noted to be slightly hypertensive, but otherwise vitals are within normal limits.  Her abdomen is benign.  She will be given IV fluids and symptomatic treatment with Zofran and vitamin B6.  Will obtain a sono to confirm IUP.  We will send a CMP to assess for HELP, UA to assess for proteinuria to assess for preeclampsia and mild hypertension (the patient has no headache or extremity edema) and prep for UTI.  Patient does not have vaginal bleeding to necessitate checking Rh factor.     ED Course as of 23 2132   Sat  Dec 23, 2023   2008 WBC(!): 14.2  W/ left shift, otherwise unremarkable.    2057 Comprehensive metabolic panel(!)  WNL.   2129 hCG Quantitative(!): 73,185   2129 Pt's sono pending at completion of my shift. Please see Dr. Rios's attestation for final result, ED course and disposition.          I have reviewed the nursing notes. I have reviewed the findings, diagnosis, plan and need for follow up with the patient.    New Prescriptions    No medications on file       Final diagnoses:   Nausea and vomiting in pregnancy         Corby Moy PA-C  [facility] EMERGENCY DEPARTMENT  12/23/2023     Corby Moy PA-C  12/23/23 2133

## 2023-12-24 NOTE — ED TRIAGE NOTES
Triage Assessment (Adult)       Row Name 12/23/23 1922          Triage Assessment    Airway WDL WDL        Respiratory WDL    Respiratory WDL WDL        Skin Circulation/Temperature WDL    Skin Circulation/Temperature WDL WDL        Cardiac WDL    Cardiac WDL WDL        Peripheral/Neurovascular WDL    Peripheral Neurovascular WDL WDL        Cognitive/Neuro/Behavioral WDL    Cognitive/Neuro/Behavioral WDL WDL

## 2024-01-18 PROCEDURE — 93010 ELECTROCARDIOGRAM REPORT: CPT | Mod: 59 | Performed by: EMERGENCY MEDICINE

## 2024-01-18 PROCEDURE — 76705 ECHO EXAM OF ABDOMEN: CPT | Performed by: EMERGENCY MEDICINE

## 2024-01-18 PROCEDURE — 76705 ECHO EXAM OF ABDOMEN: CPT | Mod: 26 | Performed by: EMERGENCY MEDICINE

## 2024-01-18 PROCEDURE — 93005 ELECTROCARDIOGRAM TRACING: CPT | Mod: 59 | Performed by: EMERGENCY MEDICINE

## 2024-01-18 PROCEDURE — 99284 EMERGENCY DEPT VISIT MOD MDM: CPT | Mod: 25 | Performed by: EMERGENCY MEDICINE

## 2024-01-19 ENCOUNTER — ANCILLARY PROCEDURE (OUTPATIENT)
Dept: ULTRASOUND IMAGING | Facility: CLINIC | Age: 34
End: 2024-01-19
Attending: EMERGENCY MEDICINE
Payer: COMMERCIAL

## 2024-01-19 ENCOUNTER — HOSPITAL ENCOUNTER (EMERGENCY)
Facility: CLINIC | Age: 34
Discharge: HOME | End: 2024-01-19
Attending: EMERGENCY MEDICINE | Admitting: EMERGENCY MEDICINE
Payer: COMMERCIAL

## 2024-01-19 VITALS
SYSTOLIC BLOOD PRESSURE: 135 MMHG | DIASTOLIC BLOOD PRESSURE: 86 MMHG | OXYGEN SATURATION: 100 % | RESPIRATION RATE: 18 BRPM | TEMPERATURE: 97.8 F | HEART RATE: 81 BPM

## 2024-01-19 DIAGNOSIS — R11.2 NAUSEA AND VOMITING, UNSPECIFIED VOMITING TYPE: ICD-10-CM

## 2024-01-19 DIAGNOSIS — Z34.02 ENCOUNTER FOR SUPERVISION OF NORMAL FIRST PREGNANCY IN SECOND TRIMESTER: ICD-10-CM

## 2024-01-19 DIAGNOSIS — R10.10 UPPER ABDOMINAL PAIN: ICD-10-CM

## 2024-01-19 DIAGNOSIS — O26.891 ABDOMINAL PAIN DURING PREGNANCY IN FIRST TRIMESTER: ICD-10-CM

## 2024-01-19 DIAGNOSIS — R10.9 ABDOMINAL PAIN DURING PREGNANCY IN FIRST TRIMESTER: ICD-10-CM

## 2024-01-19 LAB
ALBUMIN SERPL BCG-MCNC: 3.7 G/DL (ref 3.5–5.2)
ALP SERPL-CCNC: 61 U/L (ref 40–150)
ALT SERPL W P-5'-P-CCNC: 12 U/L (ref 0–50)
ANION GAP SERPL CALCULATED.3IONS-SCNC: 8 MMOL/L (ref 7–15)
AST SERPL W P-5'-P-CCNC: 19 U/L (ref 0–45)
ATRIAL RATE - MUSE: 58 BPM
BASOPHILS # BLD AUTO: 0 10E3/UL (ref 0–0.2)
BASOPHILS NFR BLD AUTO: 0 %
BILIRUB SERPL-MCNC: 0.3 MG/DL
BUN SERPL-MCNC: 8.2 MG/DL (ref 6–20)
CALCIUM SERPL-MCNC: 9.4 MG/DL (ref 8.6–10)
CHLORIDE SERPL-SCNC: 101 MMOL/L (ref 98–107)
CREAT SERPL-MCNC: 0.36 MG/DL (ref 0.51–0.95)
DEPRECATED HCO3 PLAS-SCNC: 26 MMOL/L (ref 22–29)
DIASTOLIC BLOOD PRESSURE - MUSE: NORMAL MMHG
EGFRCR SERPLBLD CKD-EPI 2021: >90 ML/MIN/1.73M2
EOSINOPHIL # BLD AUTO: 0.1 10E3/UL (ref 0–0.7)
EOSINOPHIL NFR BLD AUTO: 0 %
ERYTHROCYTE [DISTWIDTH] IN BLOOD BY AUTOMATED COUNT: 12.4 % (ref 10–15)
GLUCOSE SERPL-MCNC: 98 MG/DL (ref 70–99)
HCT VFR BLD AUTO: 38.4 % (ref 35–47)
HGB BLD-MCNC: 13.2 G/DL (ref 11.7–15.7)
IMM GRANULOCYTES # BLD: 0.1 10E3/UL
IMM GRANULOCYTES NFR BLD: 1 %
INTERPRETATION ECG - MUSE: NORMAL
LIPASE SERPL-CCNC: 47 U/L (ref 13–60)
LYMPHOCYTES # BLD AUTO: 2.4 10E3/UL (ref 0.8–5.3)
LYMPHOCYTES NFR BLD AUTO: 17 %
MCH RBC QN AUTO: 30.5 PG (ref 26.5–33)
MCHC RBC AUTO-ENTMCNC: 34.4 G/DL (ref 31.5–36.5)
MCV RBC AUTO: 89 FL (ref 78–100)
MONOCYTES # BLD AUTO: 1 10E3/UL (ref 0–1.3)
MONOCYTES NFR BLD AUTO: 7 %
NEUTROPHILS # BLD AUTO: 10.6 10E3/UL (ref 1.6–8.3)
NEUTROPHILS NFR BLD AUTO: 75 %
NRBC # BLD AUTO: 0 10E3/UL
NRBC BLD AUTO-RTO: 0 /100
P AXIS - MUSE: 23 DEGREES
PLATELET # BLD AUTO: 235 10E3/UL (ref 150–450)
POTASSIUM SERPL-SCNC: 3.8 MMOL/L (ref 3.4–5.3)
PR INTERVAL - MUSE: 176 MS
PROT SERPL-MCNC: 6.8 G/DL (ref 6.4–8.3)
QRS DURATION - MUSE: 82 MS
QT - MUSE: 426 MS
QTC - MUSE: 418 MS
R AXIS - MUSE: -9 DEGREES
RBC # BLD AUTO: 4.33 10E6/UL (ref 3.8–5.2)
SODIUM SERPL-SCNC: 135 MMOL/L (ref 135–145)
SYSTOLIC BLOOD PRESSURE - MUSE: NORMAL MMHG
T AXIS - MUSE: 27 DEGREES
TROPONIN T SERPL HS-MCNC: <6 NG/L
VENTRICULAR RATE- MUSE: 58 BPM
WBC # BLD AUTO: 14.1 10E3/UL (ref 4–11)

## 2024-01-19 PROCEDURE — 85025 COMPLETE CBC W/AUTO DIFF WBC: CPT | Performed by: EMERGENCY MEDICINE

## 2024-01-19 PROCEDURE — 84484 ASSAY OF TROPONIN QUANT: CPT | Performed by: EMERGENCY MEDICINE

## 2024-01-19 PROCEDURE — 83690 ASSAY OF LIPASE: CPT | Performed by: EMERGENCY MEDICINE

## 2024-01-19 PROCEDURE — 80053 COMPREHEN METABOLIC PANEL: CPT | Performed by: EMERGENCY MEDICINE

## 2024-01-19 PROCEDURE — 36415 COLL VENOUS BLD VENIPUNCTURE: CPT | Performed by: EMERGENCY MEDICINE

## 2024-01-19 RX ORDER — PRENATAL VIT/IRON FUM/FOLIC AC 27MG-0.8MG
1 TABLET ORAL DAILY
Qty: 30 TABLET | Refills: 0 | Status: SHIPPED | OUTPATIENT
Start: 2024-01-19

## 2024-01-19 RX ORDER — METOCLOPRAMIDE 5 MG/1
5 TABLET ORAL 2 TIMES DAILY PRN
Qty: 20 TABLET | Refills: 0 | Status: ON HOLD | OUTPATIENT
Start: 2024-01-19 | End: 2024-08-16

## 2024-01-19 ASSESSMENT — ACTIVITIES OF DAILY LIVING (ADL)
ADLS_ACUITY_SCORE: 33
ADLS_ACUITY_SCORE: 33

## 2024-01-19 NOTE — DISCHARGE INSTRUCTIONS
Instructions from your doctor today:  Emergency Department (ED) testing is focused on the potential causes of your symptoms that are the most dangerous possibilities, and cannot cover every possibility. Based on the evaluation, it was deemed sufficiently safe to discharge and continue management through the clinics. Thus, follow-up is very important to assess for improvement/worsening, potential further testing, and potential treatment adjustments. If you were given opioid pain medications or other medications that can make you drowsy while in the ED, you should not drive for at least several hours and not until you feel completely back to normal.     Please make an appointment to follow up with:  - Your Primary Care Provider in 2-5 days  - If you do not have a primary care provider, you can be seen in follow-up and establish care by calling any of the clinics below:     - [facility] (phone: [phone])     - [facility] / [facility] (phone: [phone])   - Have your clinic provider review the results from today's visit with you again, including any potential follow-up or additional testing that may be needed based on the results. Occasionally, incidental findings are found on later review by radiologists that may need follow-up.     Return to the Emergency Department immediately if you have worsening symptoms, or any other urgent health concerns.

## 2024-01-19 NOTE — ED TRIAGE NOTES
Triage Assessment (Adult)       Row Name 01/19/24 0023          Triage Assessment    Airway WDL WDL        Respiratory WDL    Respiratory WDL WDL        Skin Circulation/Temperature WDL    Skin Circulation/Temperature WDL WDL        Cardiac WDL    Cardiac WDL WDL

## 2024-01-19 NOTE — ED PROVIDER NOTES
History     Chief Complaint   Patient presents with    Shortness of Breath     HPI  Heike Musa is a 34 year old  female with PMH notable for current pregnancy (8w4d gestation by LMP 2023, 8w6d gestation by 7w2d US on 2023) who presents to the ED with upper abdominal discomfort.  Omnigy  used.  Patient reports symptoms progressing over the past several days.  Patient points across the upper abdomen as location of the discomfort.  She still has occasional vomiting in the morning which she attributes to her pregnancy.  No diarrhea.  Patient also having shortness of breath mainly when laying down.  Associated heaviness sensation of the chest, not overly painful.  No recent cough nor fever.  No history of DVT/PE, no leg swelling, no exogenous estrogen, no recent immobilization.  No vaginal discharge or discomfort, no dysuria, no urinary frequency.  Patient does not endorse urinary urgency.    Past Medical History  Past Medical History:   Diagnosis Date    Migraines      History reviewed. No pertinent surgical history.  acetaminophen (TYLENOL) 325 MG tablet  benzocaine-menthol (DERMOPLAST) 20-0.5 % AERO  bisacodyl (DULCOLAX) 10 MG suppository  diclofenac (VOLTAREN) 1 % topical gel  doxylamine (UNISOM) 25 MG TABS tablet  ibuprofen (ADVIL/MOTRIN) 800 MG tablet  metoclopramide (REGLAN) 5 MG tablet  metoclopramide (REGLAN) 5 MG tablet  phenylephrine-shark liver oil-mineral oil-petrolatum (PREPARATION H) 0.25-14-74.9 % rectal ointment  Prenatal Vit-Fe Fumarate-FA (PRENATAL MULTIVITAMIN W/IRON) 27-0.8 MG tablet  psyllium (METAMUCIL SMOOTH TEXTURE) 28 % packet      No Known Allergies  Family History  Family History   Problem Relation Age of Onset    Hypertension Mother      Social History   Social History     Tobacco Use    Smoking status: Never    Smokeless tobacco: Never   Substance Use Topics    Alcohol use: No    Drug use: No         A medically appropriate review of systems was performed with  pertinent positives and negatives noted in the HPI, and all other systems negative.    Physical Exam   BP: 135/86  Pulse: 81  Temp: 97.8  F (36.6  C)  Resp: 18  SpO2: 100 %    Physical Exam  General: no acute distress. Appears stated age.   HENT: MMM, no oropharyngeal lesions  Eyes: PERRL, normal sclerae  Cardio: Regular rate. Regular rhythm. Extremities well perfused  Resp: Normal work of breathing, normal respiratory rate.  Chest/Back: no visual signs of trauma, no CVA tenderness  Abdomen: Diffuse mild tenderness, non-distended, no rebound, no guarding  Neuro: alert and fully oriented. CN II-XII grossly intact. Grossly normal strength and sensation in all extremities.   MSK: no deformities. Grossly normal ROM in extremities.   Integumentary/Skin: no rash visualized, normal color  Psych: normal affect, normal behavior    ED Course      Procedures  Results for orders placed during the hospital encounter of 01/19/24    POC US ABDOMEN LIMITED    Impression  Limited Bedside ED Pelvic Ultrasound (PREGNANT PATIENT):  PROCEDURE: PERFORMED BY: Dr. Godfrey Larsen MD  INDICATIONS/SYMPTOM: Abdominal Pain  PROBE: Low frequency convex probe  Type of US Study: Transabdominal Exam  BODY LOCATION: Pelvis  FINDINGS: IUP with fetal pole present. CRL 3.4 cm (c/w 10w2d gestation), . Spontaneous movement present  INTERPRETATION: Normal pelvic ultrasound with intrauterine pregnancy present. FHR was 161 with noted fetal activity.  No pelvic free fluid was present. No adnexal abnormality noted.  IMAGE DOCUMENTATION: Images were archived to PACs system      Limited Bedside ED Gallbladder Ultrasound:  PROCEDURE: PERFORMED BY: Dr. Godfrey Larsen MD  INDICATIONS:  Abdominal Pain  PROBE:  Low frequency convex probe  BODY LOCATION: Abdomen (right upper quadrant)  FINDINGS:   An ultrasound of the gallbladder was performed using longitudinal and transverse views.  Gallstone(s):  Absent  Gallbladder sludge:  Absent  Sonographic  Reynolds's sign:  Absent  Gallbladder wall thickening (greater than 4 mm):  Absent  Pericholecystic fluid: Absent  Common bile duct (dilated if internal diameter greater than 6 mm): <4 mm  INTERPRETATION:  No gallstones visualized. No evidence of cholecystitis.  IMAGE DOCUMENTATION: Images were archived to PACs system.      Limited Bedside ED Cardiac Ultrasound  PROCEDURE: PERFORMED BY: Dr. Godfrey Larsen MD  INDICATIONS/SYMPTOM:  Chest Pain  PROBE: Cardiac phased array probe  BODY LOCATION: Chest (cardiac)  FINDINGS: The ultrasound was performed utilizing the subcostal views.  Grossly normal LV contractility. No RV dilation visualized. No pericardial effusion visualized.  INTERPRETATION:    Grossly normal LV contractility. No pericardial effusion. No RV dilation.  IMAGE DOCUMENTATION: Images were archived to PACs system.      Limited Bedside ED Ultrasound of Thorax:  PROCEDURE: PERFORMED BY: Dr. Godfrey Larsen MD  INDICATIONS/SYMPTOM:  Chest pain  PROBE: Cardiac phased array probe  BODY LOCATION: Chest (Lungs)  FINDINGS: Images of both lung hemithoracies taken in 2D in multiple rib spaces  Left lung: Sliding signs intact. Minimal B-lines. Lung base without visualized fluid above the diaphragm.  Right lung: Sliding signs intact. Minimal B-lines. Lung base without visualized fluid above the diaphragm.  INTERPRETATION: No evidence of pulmonary edema, no evidence of pleural effusion, no evidence of pneumothorax.  IMAGE DOCUMENTATION: Images were archived to PACs system.            EKG Interpretation:      Interpreted by Godfrey Larsen MD  Time reviewed: 0132  Symptoms at time of EKG: chest heaviness   Rhythm: sinus bradycardia  Rate: 58  Axis: Normal  Ectopy: none  Conduction: normal  ST Segments/ T Waves: No ST-T wave changes and No acute ischemic changes  Q Waves: none  Comparison to prior: Compared to 10/22/2023 there is no significant change    Clinical Impression: Sinus borderline bradycardia without  evidence of acute ischemia and with no acute changes compared to previous       Labs Ordered and Resulted from Time of ED Arrival to Time of ED Departure   COMPREHENSIVE METABOLIC PANEL - Abnormal       Result Value    Sodium 135      Potassium 3.8      Carbon Dioxide (CO2) 26      Anion Gap 8      Urea Nitrogen 8.2      Creatinine 0.36 (*)     GFR Estimate >90      Calcium 9.4      Chloride 101      Glucose 98      Alkaline Phosphatase 61      AST 19      ALT 12      Protein Total 6.8      Albumin 3.7      Bilirubin Total 0.3     CBC WITH PLATELETS AND DIFFERENTIAL - Abnormal    WBC Count 14.1 (*)     RBC Count 4.33      Hemoglobin 13.2      Hematocrit 38.4      MCV 89      MCH 30.5      MCHC 34.4      RDW 12.4      Platelet Count 235      % Neutrophils 75      % Lymphocytes 17      % Monocytes 7      % Eosinophils 0      % Basophils 0      % Immature Granulocytes 1      NRBCs per 100 WBC 0      Absolute Neutrophils 10.6 (*)     Absolute Lymphocytes 2.4      Absolute Monocytes 1.0      Absolute Eosinophils 0.1      Absolute Basophils 0.0      Absolute Immature Granulocytes 0.1      Absolute NRBCs 0.0     LIPASE - Normal    Lipase 47     TROPONIN T, HIGH SENSITIVITY - Normal    Troponin T, High Sensitivity <6       POC US ABDOMEN LIMITED   Final Result   Limited Bedside ED Pelvic Ultrasound (PREGNANT PATIENT):   PROCEDURE: PERFORMED BY: Dr. Godfrey Larsen MD   INDICATIONS/SYMPTOM: Abdominal Pain   PROBE: Low frequency convex probe   Type of US Study: Transabdominal Exam   BODY LOCATION: Pelvis   FINDINGS: IUP with fetal pole present. CRL 3.4 cm (c/w 10w2d gestation), . Spontaneous movement present   INTERPRETATION: Normal pelvic ultrasound with intrauterine pregnancy present. FHR was 161 with noted fetal activity.  No pelvic free fluid was present. No adnexal abnormality noted.   IMAGE DOCUMENTATION: Images were archived to PACs system         Limited Bedside ED Gallbladder Ultrasound:   PROCEDURE:  PERFORMED BY: Dr. Godfrey Larsen MD   INDICATIONS:  Abdominal Pain   PROBE:  Low frequency convex probe   BODY LOCATION: Abdomen (right upper quadrant)   FINDINGS:   An ultrasound of the gallbladder was performed using longitudinal and transverse views.   Gallstone(s):  Absent   Gallbladder sludge:  Absent   Sonographic Reynolds's sign:  Absent   Gallbladder wall thickening (greater than 4 mm):  Absent   Pericholecystic fluid: Absent    Common bile duct (dilated if internal diameter greater than 6 mm): <4 mm   INTERPRETATION:  No gallstones visualized. No evidence of cholecystitis.    IMAGE DOCUMENTATION: Images were archived to PACs system.         Limited Bedside ED Cardiac Ultrasound   PROCEDURE: PERFORMED BY: Dr. Godfrey Larsen MD   INDICATIONS/SYMPTOM:  Chest Pain   PROBE: Cardiac phased array probe   BODY LOCATION: Chest (cardiac)   FINDINGS: The ultrasound was performed utilizing the subcostal views.   Grossly normal LV contractility. No RV dilation visualized. No pericardial effusion visualized.    INTERPRETATION:    Grossly normal LV contractility. No pericardial effusion. No RV dilation.    IMAGE DOCUMENTATION: Images were archived to PACs system.         Limited Bedside ED Ultrasound of Thorax:   PROCEDURE: PERFORMED BY: Dr. Godfrey Larsen MD   INDICATIONS/SYMPTOM:  Chest pain   PROBE: Cardiac phased array probe   BODY LOCATION: Chest (Lungs)   FINDINGS: Images of both lung hemithoracies taken in 2D in multiple rib spaces   Left lung: Sliding signs intact. Minimal B-lines. Lung base without visualized fluid above the diaphragm.    Right lung: Sliding signs intact. Minimal B-lines. Lung base without visualized fluid above the diaphragm.    INTERPRETATION: No evidence of pulmonary edema, no evidence of pleural effusion, no evidence of pneumothorax.    IMAGE DOCUMENTATION: Images were archived to PACs system.                      Medical Decision Making  The patient's presentation was of moderate  complexity (an acute illness with systemic symptoms).    The patient's evaluation involved:  ordering and/or review of 3+ test(s) in this encounter (see separate area of note for details)    The patient's management necessitated moderate risk (prescription drug management including medications given in the ED).      Assessments & Plan (with Medical Decision Making)   Patient presenting with pain across the upper abdomen and chest heaviness in the context of first trimester pregnancy. Vitals in the ED unremarkable. Nursing notes reviewed.     RUQ ultrasound without evidence of gallstones/cholecystitis. No LFT elevations to suggest hepatobiliary pathology. No lipase elevation to suggest pancreatitis. No peritoneal signs on exam to suggest peritonitis. Low clinical suspicion for bowel obstruction.     ECG shows NSR without evidence of acute ischemia, high-sensitivity troponin negative - ACS very unlikely. VTE risk factor profile very low - PE very unlikely. Character and severity of pain less severe than would be expected for aortic dissection. Bedside cardiac US demonstrates grossly normal LV contractility, no RV dilation, no pericardial effusion. Lack of ECG findings and lack of effusion makes pericarditis very unlikely.  Considered chest x-ray, but elected for thoracic ultrasound to limit radiation in the context of pregnancy.  Thoracic ultrasound without evidence of pneumonia, pulmonary edema, pleural effusions, nor pneumothorax.    Fetal ultrasound showed intrauterine pregnancy with normal fetal heart rate and spontaneous movement, was somewhat larger than reported dates.    The complete clinical picture is most consistent with likely gastritis as etiology of upper abdominal pain, vomiting likely related to pregnancy, unclear etiology of chest heaviness sensation. After counseling on the diagnosis, work-up, and treatment plan, the patient was discharged to home.  Metoclopramide prescribed. The patient was advised  to follow-up with primary care in few days. The patient was advised to return to the ED if worsening symptoms,  or any urgent health concerns.     Final diagnoses:   Upper abdominal pain   Abdominal pain during pregnancy in first trimester   Nausea and vomiting, unspecified vomiting type       --  Godfrey Larsen MD   Emergency Medicine   [facility] EMERGENCY DEPARTMENT  1/18/2024       Godfrey Larsen MD  01/21/24 5622

## 2024-01-19 NOTE — ED TRIAGE NOTES
pt states she is pregnant and reports having some difficult breathing and tightness onset 2 weeks ago. States she notices she is short of breath like she is 9 months pregnant weather flat on her side etc. Pt states she is 10 weeks pregnant. She has her first OB appt on Monday.      When asked if she is having any other sx she states she having constipation and c/o sharp poking pain near the breast bone bilaterally to the umbilicus for the past 2 weeks.     She states tonight the sx become worse,      Denies vaginal bleeding discharge urinary or pelvic pain.

## 2024-01-22 ENCOUNTER — TRANSCRIBE ORDERS (OUTPATIENT)
Dept: MATERNAL FETAL MEDICINE | Facility: CLINIC | Age: 34
End: 2024-01-22
Payer: COMMERCIAL

## 2024-01-22 ENCOUNTER — LAB REQUISITION (OUTPATIENT)
Dept: LAB | Facility: CLINIC | Age: 34
End: 2024-01-22
Payer: COMMERCIAL

## 2024-01-22 DIAGNOSIS — O26.90 PREGNANCY RELATED CONDITION, ANTEPARTUM: Primary | ICD-10-CM

## 2024-01-22 DIAGNOSIS — Z34.01 ENCOUNTER FOR SUPERVISION OF NORMAL FIRST PREGNANCY, FIRST TRIMESTER: ICD-10-CM

## 2024-01-22 DIAGNOSIS — Z36.89 ENCOUNTER FOR FETAL ANATOMIC SURVEY: Primary | ICD-10-CM

## 2024-02-12 ENCOUNTER — APPOINTMENT (OUTPATIENT)
Dept: INTERPRETER SERVICES | Facility: CLINIC | Age: 34
End: 2024-02-12
Payer: COMMERCIAL

## 2024-02-26 ENCOUNTER — LAB REQUISITION (OUTPATIENT)
Dept: LAB | Facility: CLINIC | Age: 34
End: 2024-02-26
Payer: COMMERCIAL

## 2024-02-26 DIAGNOSIS — O09.91 SUPERVISION OF HIGH RISK PREGNANCY, UNSPECIFIED, FIRST TRIMESTER: ICD-10-CM

## 2024-02-26 LAB
ABO/RH(D): NORMAL
ANTIBODY SCREEN: NEGATIVE
HBV SURFACE AB SERPL IA-ACNC: 74.7 M[IU]/ML
HBV SURFACE AB SERPL IA-ACNC: REACTIVE M[IU]/ML
HBV SURFACE AG SERPL QL IA: NONREACTIVE
HCV AB SERPL QL IA: NONREACTIVE
RUBV IGG SERPL QL IA: 4.99 INDEX
RUBV IGG SERPL QL IA: POSITIVE
SPECIMEN EXPIRATION DATE: NORMAL
T PALLIDUM AB SER QL: NONREACTIVE
VIT D+METAB SERPL-MCNC: 21 NG/ML (ref 20–50)
VZV IGG SER QL IA: 1422 INDEX
VZV IGG SER QL IA: POSITIVE

## 2024-02-26 PROCEDURE — 87086 URINE CULTURE/COLONY COUNT: CPT | Mod: ORL | Performed by: ADVANCED PRACTICE MIDWIFE

## 2024-02-26 PROCEDURE — 86780 TREPONEMA PALLIDUM: CPT | Mod: ORL | Performed by: ADVANCED PRACTICE MIDWIFE

## 2024-02-26 PROCEDURE — 82306 VITAMIN D 25 HYDROXY: CPT | Mod: ORL | Performed by: ADVANCED PRACTICE MIDWIFE

## 2024-02-26 PROCEDURE — 86900 BLOOD TYPING SEROLOGIC ABO: CPT | Mod: ORL | Performed by: ADVANCED PRACTICE MIDWIFE

## 2024-02-26 PROCEDURE — 86762 RUBELLA ANTIBODY: CPT | Mod: ORL | Performed by: ADVANCED PRACTICE MIDWIFE

## 2024-02-26 PROCEDURE — 86706 HEP B SURFACE ANTIBODY: CPT | Mod: ORL | Performed by: ADVANCED PRACTICE MIDWIFE

## 2024-02-26 PROCEDURE — 83021 HEMOGLOBIN CHROMOTOGRAPHY: CPT | Mod: ORL | Performed by: ADVANCED PRACTICE MIDWIFE

## 2024-02-26 PROCEDURE — 86787 VARICELLA-ZOSTER ANTIBODY: CPT | Mod: ORL | Performed by: ADVANCED PRACTICE MIDWIFE

## 2024-02-26 PROCEDURE — 86803 HEPATITIS C AB TEST: CPT | Mod: ORL | Performed by: ADVANCED PRACTICE MIDWIFE

## 2024-02-26 PROCEDURE — 87340 HEPATITIS B SURFACE AG IA: CPT | Mod: ORL | Performed by: ADVANCED PRACTICE MIDWIFE

## 2024-02-27 LAB — BACTERIA UR CULT: NORMAL

## 2024-02-28 LAB
HGB A1 MFR BLD: 95.3 %
HGB A2 MFR BLD: 3.3 %
HGB C MFR BLD: 0 %
HGB E MFR BLD: 0 %
HGB F MFR BLD: 1.4 %
HGB FRACT BLD ELPH-IMP: NORMAL
HGB OTHER MFR BLD: 0 %
HGB S BLD QL SOLY: NORMAL
HGB S MFR BLD: 0 %
PATH INTERP BLD-IMP: NORMAL

## 2024-03-14 ENCOUNTER — HOSPITAL ENCOUNTER (EMERGENCY)
Facility: CLINIC | Age: 34
Discharge: HOME | End: 2024-03-14
Attending: EMERGENCY MEDICINE | Admitting: EMERGENCY MEDICINE
Payer: COMMERCIAL

## 2024-03-14 VITALS
TEMPERATURE: 98 F | SYSTOLIC BLOOD PRESSURE: 114 MMHG | OXYGEN SATURATION: 100 % | DIASTOLIC BLOOD PRESSURE: 72 MMHG | HEART RATE: 68 BPM | RESPIRATION RATE: 16 BRPM

## 2024-03-14 DIAGNOSIS — E86.0 DEHYDRATION: ICD-10-CM

## 2024-03-14 DIAGNOSIS — R53.83 FATIGUE, UNSPECIFIED TYPE: ICD-10-CM

## 2024-03-14 LAB
ALBUMIN SERPL BCG-MCNC: 3.6 G/DL (ref 3.5–5.2)
ALBUMIN UR-MCNC: 10 MG/DL
ALP SERPL-CCNC: 77 U/L (ref 40–150)
ALT SERPL W P-5'-P-CCNC: 10 U/L (ref 0–50)
ANION GAP SERPL CALCULATED.3IONS-SCNC: 8 MMOL/L (ref 7–15)
APPEARANCE UR: ABNORMAL
AST SERPL W P-5'-P-CCNC: 19 U/L (ref 0–45)
BACTERIA #/AREA URNS HPF: ABNORMAL /HPF
BASOPHILS # BLD AUTO: 0 10E3/UL (ref 0–0.2)
BASOPHILS NFR BLD AUTO: 0 %
BILIRUB SERPL-MCNC: 0.3 MG/DL
BILIRUB UR QL STRIP: NEGATIVE
BUN SERPL-MCNC: 4.7 MG/DL (ref 6–20)
CALCIUM SERPL-MCNC: 9.5 MG/DL (ref 8.6–10)
CHLORIDE SERPL-SCNC: 101 MMOL/L (ref 98–107)
COLOR UR AUTO: YELLOW
CREAT SERPL-MCNC: 0.39 MG/DL (ref 0.51–0.95)
DEPRECATED HCO3 PLAS-SCNC: 25 MMOL/L (ref 22–29)
EGFRCR SERPLBLD CKD-EPI 2021: >90 ML/MIN/1.73M2
EOSINOPHIL # BLD AUTO: 0 10E3/UL (ref 0–0.7)
EOSINOPHIL NFR BLD AUTO: 0 %
ERYTHROCYTE [DISTWIDTH] IN BLOOD BY AUTOMATED COUNT: 12.6 % (ref 10–15)
FLUAV RNA SPEC QL NAA+PROBE: NEGATIVE
FLUBV RNA RESP QL NAA+PROBE: NEGATIVE
GLUCOSE SERPL-MCNC: 101 MG/DL (ref 70–99)
GLUCOSE UR STRIP-MCNC: NEGATIVE MG/DL
HCT VFR BLD AUTO: 38.4 % (ref 35–47)
HGB BLD-MCNC: 13 G/DL (ref 11.7–15.7)
HGB UR QL STRIP: NEGATIVE
IMM GRANULOCYTES # BLD: 0.1 10E3/UL
IMM GRANULOCYTES NFR BLD: 1 %
KETONES UR STRIP-MCNC: NEGATIVE MG/DL
LEUKOCYTE ESTERASE UR QL STRIP: ABNORMAL
LIPASE SERPL-CCNC: 42 U/L (ref 13–60)
LYMPHOCYTES # BLD AUTO: 1.6 10E3/UL (ref 0.8–5.3)
LYMPHOCYTES NFR BLD AUTO: 15 %
MCH RBC QN AUTO: 30.2 PG (ref 26.5–33)
MCHC RBC AUTO-ENTMCNC: 33.9 G/DL (ref 31.5–36.5)
MCV RBC AUTO: 89 FL (ref 78–100)
MONOCYTES # BLD AUTO: 0.8 10E3/UL (ref 0–1.3)
MONOCYTES NFR BLD AUTO: 7 %
MUCOUS THREADS #/AREA URNS LPF: PRESENT /LPF
NEUTROPHILS # BLD AUTO: 8.7 10E3/UL (ref 1.6–8.3)
NEUTROPHILS NFR BLD AUTO: 77 %
NITRATE UR QL: NEGATIVE
NRBC # BLD AUTO: 0 10E3/UL
NRBC BLD AUTO-RTO: 0 /100
PH UR STRIP: 5.5 [PH] (ref 5–7)
PLATELET # BLD AUTO: 233 10E3/UL (ref 150–450)
POTASSIUM SERPL-SCNC: 3.8 MMOL/L (ref 3.4–5.3)
PROT SERPL-MCNC: 6.7 G/DL (ref 6.4–8.3)
RBC # BLD AUTO: 4.31 10E6/UL (ref 3.8–5.2)
RBC URINE: 1 /HPF
RSV RNA SPEC NAA+PROBE: NEGATIVE
SARS-COV-2 RNA RESP QL NAA+PROBE: NEGATIVE
SODIUM SERPL-SCNC: 134 MMOL/L (ref 135–145)
SP GR UR STRIP: 1.02 (ref 1–1.03)
SQUAMOUS EPITHELIAL: 27 /HPF
UROBILINOGEN UR STRIP-MCNC: NORMAL MG/DL
WBC # BLD AUTO: 11.3 10E3/UL (ref 4–11)
WBC URINE: 6 /HPF

## 2024-03-14 PROCEDURE — 99284 EMERGENCY DEPT VISIT MOD MDM: CPT | Mod: 25 | Performed by: EMERGENCY MEDICINE

## 2024-03-14 PROCEDURE — 258N000003 HC RX IP 258 OP 636: Performed by: EMERGENCY MEDICINE

## 2024-03-14 PROCEDURE — 96374 THER/PROPH/DIAG INJ IV PUSH: CPT | Performed by: EMERGENCY MEDICINE

## 2024-03-14 PROCEDURE — 96361 HYDRATE IV INFUSION ADD-ON: CPT | Performed by: EMERGENCY MEDICINE

## 2024-03-14 PROCEDURE — 36415 COLL VENOUS BLD VENIPUNCTURE: CPT | Performed by: EMERGENCY MEDICINE

## 2024-03-14 PROCEDURE — 99284 EMERGENCY DEPT VISIT MOD MDM: CPT | Performed by: EMERGENCY MEDICINE

## 2024-03-14 PROCEDURE — 250N000011 HC RX IP 250 OP 636: Mod: JZ | Performed by: EMERGENCY MEDICINE

## 2024-03-14 PROCEDURE — 83690 ASSAY OF LIPASE: CPT | Performed by: EMERGENCY MEDICINE

## 2024-03-14 PROCEDURE — 85025 COMPLETE CBC W/AUTO DIFF WBC: CPT | Performed by: EMERGENCY MEDICINE

## 2024-03-14 PROCEDURE — 81001 URINALYSIS AUTO W/SCOPE: CPT | Performed by: EMERGENCY MEDICINE

## 2024-03-14 PROCEDURE — 80053 COMPREHEN METABOLIC PANEL: CPT | Performed by: EMERGENCY MEDICINE

## 2024-03-14 PROCEDURE — 87637 SARSCOV2&INF A&B&RSV AMP PRB: CPT | Performed by: EMERGENCY MEDICINE

## 2024-03-14 RX ORDER — METOCLOPRAMIDE HYDROCHLORIDE 5 MG/ML
5 INJECTION INTRAMUSCULAR; INTRAVENOUS ONCE
Status: COMPLETED | OUTPATIENT
Start: 2024-03-14 | End: 2024-03-14

## 2024-03-14 RX ADMIN — METOCLOPRAMIDE HYDROCHLORIDE 5 MG: 5 INJECTION INTRAMUSCULAR; INTRAVENOUS at 09:48

## 2024-03-14 RX ADMIN — SODIUM CHLORIDE 1000 ML: 9 INJECTION, SOLUTION INTRAVENOUS at 09:47

## 2024-03-14 ASSESSMENT — ACTIVITIES OF DAILY LIVING (ADL)
ADLS_ACUITY_SCORE: 35
ADLS_ACUITY_SCORE: 35

## 2024-03-14 ASSESSMENT — COLUMBIA-SUICIDE SEVERITY RATING SCALE - C-SSRS
1. IN THE PAST MONTH, HAVE YOU WISHED YOU WERE DEAD OR WISHED YOU COULD GO TO SLEEP AND NOT WAKE UP?: NO
2. HAVE YOU ACTUALLY HAD ANY THOUGHTS OF KILLING YOURSELF IN THE PAST MONTH?: NO
6. HAVE YOU EVER DONE ANYTHING, STARTED TO DO ANYTHING, OR PREPARED TO DO ANYTHING TO END YOUR LIFE?: NO

## 2024-03-14 NOTE — ED TRIAGE NOTES
Patient reports since Tuesday feeling weakness and headache and nausea. She did take Tylenol and nausea medication (she is unsure of the name) which helped but only until it wears off.      Triage Assessment (Adult)       Row Name 03/14/24 0900          Triage Assessment    Airway WDL WDL        Respiratory WDL    Respiratory WDL WDL        Skin Circulation/Temperature WDL    Skin Circulation/Temperature WDL WDL        Cardiac WDL    Cardiac WDL WDL        Peripheral/Neurovascular WDL    Peripheral Neurovascular WDL WDL        Cognitive/Neuro/Behavioral WDL    Cognitive/Neuro/Behavioral WDL WDL

## 2024-03-14 NOTE — DISCHARGE INSTRUCTIONS
Drink plenty of fluids.  Take acetaminophen as needed for discomfort.    Follow-up with your OB/GYN as planned.    Return if fever or other concerns.

## 2024-03-14 NOTE — ED PROVIDER NOTES
ED Provider Note  [facility]      History     Chief Complaint   Patient presents with    Generalized Weakness    Headache     HPI  Heike Musa is a 34 year old 18-week pregnant female who presents to the emergency department feeling generally unwell for the past 2 days.  Patient complains of diffuse myalgias, chills, generalized weakness, fatigue, and frontal headache.  She reports nausea but no vomiting.  Patient denies any chest pain or dyspnea.  No cough.  No sore throat.  Patient denies any photophobia.  No diplopia.  No incoordination.  No difficulty with speech or walking.  No change in symptoms with position specifically recumbent position.  Patient states that at times she feels like the floor is moving.  She does have a history of positional vertigo as well as migraine headache.  She denies any vaginal bleeding or vaginal fluid leakage.  She denies abdominal pain.    Past Medical History  Past Medical History:   Diagnosis Date    Migraines      History reviewed. No pertinent surgical history.  acetaminophen (TYLENOL) 325 MG tablet  benzocaine-menthol (DERMOPLAST) 20-0.5 % AERO  bisacodyl (DULCOLAX) 10 MG suppository  diclofenac (VOLTAREN) 1 % topical gel  doxylamine (UNISOM) 25 MG TABS tablet  ibuprofen (ADVIL/MOTRIN) 800 MG tablet  metoclopramide (REGLAN) 5 MG tablet  phenylephrine-shark liver oil-mineral oil-petrolatum (PREPARATION H) 0.25-14-74.9 % rectal ointment  Prenatal Vit-Fe Fumarate-FA (PRENATAL MULTIVITAMIN W/IRON) 27-0.8 MG tablet  psyllium (METAMUCIL SMOOTH TEXTURE) 28 % packet      No Known Allergies  Family History  Family History   Problem Relation Age of Onset    Hypertension Mother      Social History   Social History     Tobacco Use    Smoking status: Never    Smokeless tobacco: Never   Substance Use Topics    Alcohol use: No    Drug use: No         A medically appropriate review of systems was performed with pertinent positives and negatives noted in the HPI,  and all other systems negative.    Physical Exam   BP: 123/81  Pulse: 68  Temp: 98  F (36.7  C)  Resp: 16  SpO2: 100 %  Physical Exam  Vitals and nursing note reviewed.   Constitutional:       General: She is not in acute distress.     Appearance: Normal appearance. She is not diaphoretic.   HENT:      Head: Normocephalic and atraumatic.      Nose: Nose normal.      Mouth/Throat:      Mouth: Mucous membranes are moist.   Eyes:      Extraocular Movements: Extraocular movements intact.      Conjunctiva/sclera: Conjunctivae normal.      Pupils: Pupils are equal, round, and reactive to light.   Cardiovascular:      Rate and Rhythm: Normal rate and regular rhythm.      Pulses: Normal pulses.      Heart sounds: Normal heart sounds.   Pulmonary:      Effort: Pulmonary effort is normal. No respiratory distress.      Breath sounds: Normal breath sounds.   Abdominal:      Palpations: Abdomen is soft.      Tenderness: There is no abdominal tenderness.   Musculoskeletal:         General: No tenderness. Normal range of motion.      Cervical back: Normal range of motion and neck supple.   Skin:     General: Skin is warm.      Findings: No rash.   Neurological:      General: No focal deficit present.      Mental Status: She is alert.      Cranial Nerves: No cranial nerve deficit.      Sensory: No sensory deficit.      Motor: No weakness.      Coordination: Coordination normal.      Gait: Gait normal.           ED Course, Procedures, & Data      Procedures          Reviewed ED encounter on 10/22/2023 for dizziness and nausea.  Found to have trunk cellulitis with abscess.  Head CT unremarkable. Reviewed CBC, comprehensive metabolic panel, urinalysis, pregnancy test, CRP, sed rate from that encounter.  Reviewed primary care encounter from [facility] on 6/7/2023 for migraine headache and BPPV.  Patient saw neurologist in 2017 through 18 at the [facility].  She underwent vestibular rehabilitation at that time.  Reviewed  emergency department encounter 12/23/2023 for nausea and vomiting in pregnancy.  Reviewed recent labs including CBC with white blood cell count of 14,000 in both January of this year and in December of last year.     Results for orders placed or performed during the hospital encounter of 03/14/24   Comprehensive metabolic panel     Status: Abnormal   Result Value Ref Range    Sodium 134 (L) 135 - 145 mmol/L    Potassium 3.8 3.4 - 5.3 mmol/L    Carbon Dioxide (CO2) 25 22 - 29 mmol/L    Anion Gap 8 7 - 15 mmol/L    Urea Nitrogen 4.7 (L) 6.0 - 20.0 mg/dL    Creatinine 0.39 (L) 0.51 - 0.95 mg/dL    GFR Estimate >90 >60 mL/min/1.73m2    Calcium 9.5 8.6 - 10.0 mg/dL    Chloride 101 98 - 107 mmol/L    Glucose 101 (H) 70 - 99 mg/dL    Alkaline Phosphatase 77 40 - 150 U/L    AST 19 0 - 45 U/L    ALT 10 0 - 50 U/L    Protein Total 6.7 6.4 - 8.3 g/dL    Albumin 3.6 3.5 - 5.2 g/dL    Bilirubin Total 0.3 <=1.2 mg/dL   Lipase     Status: Normal   Result Value Ref Range    Lipase 42 13 - 60 U/L   UA with Microscopic reflex to Culture     Status: Abnormal    Specimen: Urine, Clean Catch   Result Value Ref Range    Color Urine Yellow Colorless, Straw, Light Yellow, Yellow    Appearance Urine Slightly Cloudy (A) Clear    Glucose Urine Negative Negative mg/dL    Bilirubin Urine Negative Negative    Ketones Urine Negative Negative mg/dL    Specific Gravity Urine 1.022 1.003 - 1.035    Blood Urine Negative Negative    pH Urine 5.5 5.0 - 7.0    Protein Albumin Urine 10 (A) Negative mg/dL    Urobilinogen Urine Normal Normal, 2.0 mg/dL    Nitrite Urine Negative Negative    Leukocyte Esterase Urine Small (A) Negative    Bacteria Urine Moderate (A) None Seen /HPF    Mucus Urine Present (A) None Seen /LPF    RBC Urine 1 <=2 /HPF    WBC Urine 6 (H) <=5 /HPF    Squamous Epithelials Urine 27 (H) <=1 /HPF    Narrative    Urine Culture not indicated   Symptomatic Influenza A/B, RSV, & SARS-CoV2 PCR (COVID-19) Nasopharyngeal     Status: Normal     Specimen: Nasopharyngeal; Swab   Result Value Ref Range    Influenza A PCR Negative Negative    Influenza B PCR Negative Negative    RSV PCR Negative Negative    SARS CoV2 PCR Negative Negative    Narrative    Testing was performed using the Xpert Xpress CoV2/Flu/RSV Assay on the Cepheid GeneXpert Instrument. This test should be ordered for the detection of SARS-CoV-2, influenza, and RSV viruses in individuals who meet clinical and/or epidemiological criteria. Test performance is unknown in asymptomatic patients. This test is for in vitro diagnostic use under the FDA EUA for laboratories certified under CLIA to perform high or moderate complexity testing. This test has not been FDA cleared or approved. A negative result does not rule out the presence of PCR inhibitors in the specimen or target RNA in concentration below the limit of detection for the assay. If only one viral target is positive but coinfection with multiple targets is suspected, the sample should be re-tested with another FDA cleared, approved, or authorized test, if coinfection would change clinical management. This test was validated by the [facility]. These laboratories are certified under the Clinical Laboratory Improvement Amendments of 1988 (CLIA-88) as qualified to perform high complexity laboratory testing.   CBC with platelets and differential     Status: Abnormal   Result Value Ref Range    WBC Count 11.3 (H) 4.0 - 11.0 10e3/uL    RBC Count 4.31 3.80 - 5.20 10e6/uL    Hemoglobin 13.0 11.7 - 15.7 g/dL    Hematocrit 38.4 35.0 - 47.0 %    MCV 89 78 - 100 fL    MCH 30.2 26.5 - 33.0 pg    MCHC 33.9 31.5 - 36.5 g/dL    RDW 12.6 10.0 - 15.0 %    Platelet Count 233 150 - 450 10e3/uL    % Neutrophils 77 %    % Lymphocytes 15 %    % Monocytes 7 %    % Eosinophils 0 %    % Basophils 0 %    % Immature Granulocytes 1 %    NRBCs per 100 WBC 0 <1 /100    Absolute Neutrophils 8.7 (H) 1.6 - 8.3 10e3/uL    Absolute Lymphocytes 1.6 0.8 -  5.3 10e3/uL    Absolute Monocytes 0.8 0.0 - 1.3 10e3/uL    Absolute Eosinophils 0.0 0.0 - 0.7 10e3/uL    Absolute Basophils 0.0 0.0 - 0.2 10e3/uL    Absolute Immature Granulocytes 0.1 <=0.4 10e3/uL    Absolute NRBCs 0.0 10e3/uL   CBC with platelets differential     Status: Abnormal    Narrative    The following orders were created for panel order CBC with platelets differential.  Procedure                               Abnormality         Status                     ---------                               -----------         ------                     CBC with platelets and d...[[id number]]  Abnormal            Final result                 Please view results for these tests on the individual orders.     Medications   sodium chloride 0.9% BOLUS 1,000 mL (0 mLs Intravenous Stopped 3/14/24 1057)   metoclopramide (REGLAN) injection 5 mg (5 mg Intravenous $Given 3/14/24 0929)     Labs Ordered and Resulted from Time of ED Arrival to Time of ED Departure   COMPREHENSIVE METABOLIC PANEL - Abnormal       Result Value    Sodium 134 (*)     Potassium 3.8      Carbon Dioxide (CO2) 25      Anion Gap 8      Urea Nitrogen 4.7 (*)     Creatinine 0.39 (*)     GFR Estimate >90      Calcium 9.5      Chloride 101      Glucose 101 (*)     Alkaline Phosphatase 77      AST 19      ALT 10      Protein Total 6.7      Albumin 3.6      Bilirubin Total 0.3     ROUTINE UA WITH MICROSCOPIC REFLEX TO CULTURE - Abnormal    Color Urine Yellow      Appearance Urine Slightly Cloudy (*)     Glucose Urine Negative      Bilirubin Urine Negative      Ketones Urine Negative      Specific Gravity Urine 1.022      Blood Urine Negative      pH Urine 5.5      Protein Albumin Urine 10 (*)     Urobilinogen Urine Normal      Nitrite Urine Negative      Leukocyte Esterase Urine Small (*)     Bacteria Urine Moderate (*)     Mucus Urine Present (*)     RBC Urine 1      WBC Urine 6 (*)     Squamous Epithelials Urine 27 (*)    CBC WITH PLATELETS AND DIFFERENTIAL -  Abnormal    WBC Count 11.3 (*)     RBC Count 4.31      Hemoglobin 13.0      Hematocrit 38.4      MCV 89      MCH 30.2      MCHC 33.9      RDW 12.6      Platelet Count 233      % Neutrophils 77      % Lymphocytes 15      % Monocytes 7      % Eosinophils 0      % Basophils 0      % Immature Granulocytes 1      NRBCs per 100 WBC 0      Absolute Neutrophils 8.7 (*)     Absolute Lymphocytes 1.6      Absolute Monocytes 0.8      Absolute Eosinophils 0.0      Absolute Basophils 0.0      Absolute Immature Granulocytes 0.1      Absolute NRBCs 0.0     LIPASE - Normal    Lipase 42     INFLUENZA A/B, RSV, & SARS-COV2 PCR - Normal    Influenza A PCR Negative      Influenza B PCR Negative      RSV PCR Negative      SARS CoV2 PCR Negative       No orders to display        Medications   sodium chloride 0.9% BOLUS 1,000 mL (0 mLs Intravenous Stopped 3/14/24 8440)   metoclopramide (REGLAN) injection 5 mg (5 mg Intravenous $Given 3/14/24 1506)      10:49 AM Feeling better. No ongoing symptoms.    Critical care was not performed.     Medical Decision Making  The patient's presentation was of moderate complexity (an undiagnosed new problem with uncertain diagnosis).    The patient's evaluation involved:  review of external note(s) from 3+ sources (see separate area of note for details)  review of 3+ test result(s) ordered prior to this encounter (see separate area of note for details)  ordering and/or review of 3+ test(s) in this encounter (see separate area of note for details)    The patient's management necessitated moderate risk (prescription drug management including medications given in the ED).    Assessment & Plan    34 year old female who is 18 weeks pregnant to the emergency room with 2-day history of generalized weakness, fatigue, chills, diffuse myalgias, and frontal headache.  Differential diagnosis includes viral illness including COVID and influenza, urinary tract infection.  No focal neurologic complaints or abnormality  on physical examination to suggest intracranial pathology including central venous thrombosis.  She has normal blood pressure so do not suspect preeclampsia or pregnancy related hypertensive condition.  The patient has a CBC which is unrevealing.  She has a mild leukocytosis which is less elevated than previous values.  No infectious symptoms so mild leukocytosis likely related to pregnancy.  No significant electrolyte abnormality on comprehensive metabolic panel.  COVID and influenza testing negative.  Urinalysis appears contaminated.  She has no UTI symptoms so we will perform urine culture.  Patient's symptoms resolved completely with IV fluids and Reglan.  Suspect dehydration playing a role in her symptoms.  Viral syndrome also possible.      I have reviewed the nursing notes. I have reviewed the findings, diagnosis, plan and need for follow up with the patient.    New Prescriptions    No medications on file       Final diagnoses:   Fatigue, unspecified type   Dehydration     Chart documentation was completed with Dragon voice-recognition software. Even though reviewed, this chart may still contain some grammatical, spelling, and word errors.     Alden Siegel Md    [facility] EMERGENCY DEPARTMENT  3/14/2024     Alden Siegel MD  03/14/24 1120

## 2024-03-20 ENCOUNTER — PRE VISIT (OUTPATIENT)
Dept: MATERNAL FETAL MEDICINE | Facility: CLINIC | Age: 34
End: 2024-03-20
Payer: COMMERCIAL

## 2024-03-20 NOTE — PROGRESS NOTES
March 21, 2024    Patient was checked in for appointment but declined to meet with genetic counselor. Patient did have level II comprehensive ultrasound. Please see ultrasound note for more detail.    Pepper Mclaughlin MS, [facility]  Certified and Licensed Genetic Counselor  [facility]  Maternal Fetal Medicine  Office: [phone]  [facility]: [phone]   Fax: [phone]  [facility]

## 2024-03-21 ENCOUNTER — OFFICE VISIT (OUTPATIENT)
Dept: MATERNAL FETAL MEDICINE | Facility: CLINIC | Age: 34
End: 2024-03-21
Attending: ADVANCED PRACTICE MIDWIFE
Payer: COMMERCIAL

## 2024-03-21 ENCOUNTER — HOSPITAL ENCOUNTER (OUTPATIENT)
Dept: ULTRASOUND IMAGING | Facility: CLINIC | Age: 34
Discharge: HOME | End: 2024-03-21
Attending: ADVANCED PRACTICE MIDWIFE
Payer: COMMERCIAL

## 2024-03-21 DIAGNOSIS — O09.522 MULTIGRAVIDA OF ADVANCED MATERNAL AGE IN SECOND TRIMESTER: ICD-10-CM

## 2024-03-21 DIAGNOSIS — O35.9XX0 SUSPECTED FETAL ANOMALY, ANTEPARTUM, SINGLE OR UNSPECIFIED FETUS: Primary | ICD-10-CM

## 2024-03-21 DIAGNOSIS — O26.90 PREGNANCY RELATED CONDITION, ANTEPARTUM: ICD-10-CM

## 2024-03-21 PROCEDURE — 76811 OB US DETAILED SNGL FETUS: CPT

## 2024-03-21 PROCEDURE — 999N000069 HC STATISTIC GENETIC COUNSELING, < 16 MIN

## 2024-03-21 PROCEDURE — 99203 OFFICE O/P NEW LOW 30 MIN: CPT | Mod: 25 | Performed by: OBSTETRICS & GYNECOLOGY

## 2024-03-21 PROCEDURE — 76811 OB US DETAILED SNGL FETUS: CPT | Mod: 26 | Performed by: OBSTETRICS & GYNECOLOGY

## 2024-03-21 NOTE — PROGRESS NOTES
Please see the imaging tab for details of the ultrasound performed today.    Donna Jimenez MD  Specialist in Maternal-Fetal Medicine

## 2024-03-29 ENCOUNTER — LAB REQUISITION (OUTPATIENT)
Dept: LAB | Facility: CLINIC | Age: 34
End: 2024-03-29
Payer: COMMERCIAL

## 2024-03-29 DIAGNOSIS — O09.91 SUPERVISION OF HIGH RISK PREGNANCY, UNSPECIFIED, FIRST TRIMESTER: ICD-10-CM

## 2024-03-29 PROCEDURE — 87491 CHLMYD TRACH DNA AMP PROBE: CPT | Mod: ORL | Performed by: ADVANCED PRACTICE MIDWIFE

## 2024-03-29 PROCEDURE — 87591 N.GONORRHOEAE DNA AMP PROB: CPT | Mod: ORL | Performed by: ADVANCED PRACTICE MIDWIFE

## 2024-03-30 LAB
C TRACH DNA SPEC QL NAA+PROBE: NEGATIVE
N GONORRHOEA DNA SPEC QL NAA+PROBE: NEGATIVE

## 2024-04-02 ENCOUNTER — LAB REQUISITION (OUTPATIENT)
Dept: LAB | Facility: CLINIC | Age: 34
End: 2024-04-02
Payer: COMMERCIAL

## 2024-04-02 DIAGNOSIS — O09.92 SUPERVISION OF HIGH RISK PREGNANCY, UNSPECIFIED, SECOND TRIMESTER: ICD-10-CM

## 2024-04-02 DIAGNOSIS — R73.09 OTHER ABNORMAL GLUCOSE: ICD-10-CM

## 2024-04-02 PROCEDURE — 82951 GLUCOSE TOLERANCE TEST (GTT): CPT | Mod: ORL | Performed by: ADVANCED PRACTICE MIDWIFE

## 2024-04-03 LAB
GESTATIONAL GTT 1 HR POST DOSE: 214 MG/DL (ref 60–179)
GESTATIONAL GTT 2 HR POST DOSE: 197 MG/DL (ref 60–154)
GESTATIONAL GTT 3 HR POST DOSE: 155 MG/DL (ref 60–139)
GLUCOSE P FAST SERPL-MCNC: 101 MG/DL (ref 60–94)

## 2024-04-22 ENCOUNTER — HOSPITAL ENCOUNTER (OUTPATIENT)
Facility: CLINIC | Age: 34
Discharge: HOME | End: 2024-04-22
Attending: MIDWIFE | Admitting: MIDWIFE
Payer: COMMERCIAL

## 2024-04-22 ENCOUNTER — HOSPITAL ENCOUNTER (OUTPATIENT)
Facility: CLINIC | Age: 34
End: 2024-04-22
Admitting: MIDWIFE
Payer: COMMERCIAL

## 2024-04-22 DIAGNOSIS — K64.4 EXTERNAL HEMORRHOIDS: Primary | ICD-10-CM

## 2024-04-22 PROCEDURE — G0463 HOSPITAL OUTPT CLINIC VISIT: HCPCS

## 2024-04-22 PROCEDURE — 250N000013 HC RX MED GY IP 250 OP 250 PS 637: Performed by: MIDWIFE

## 2024-04-22 RX ORDER — ACETAMINOPHEN 325 MG/1
975 TABLET ORAL EVERY 6 HOURS PRN
Status: DISCONTINUED | OUTPATIENT
Start: 2024-04-22 | End: 2024-04-22 | Stop reason: HOSPADM

## 2024-04-22 RX ORDER — DIBUCAINE 0.28 G/28G
OINTMENT TOPICAL 3 TIMES DAILY PRN
Status: DISCONTINUED | OUTPATIENT
Start: 2024-04-22 | End: 2024-04-22 | Stop reason: HOSPADM

## 2024-04-22 RX ORDER — AMOXICILLIN 250 MG
1 CAPSULE ORAL
Status: ON HOLD | COMMUNITY
Start: 2024-01-22 | End: 2024-08-16

## 2024-04-22 RX ORDER — DOCUSATE SODIUM 100 MG/1
100 CAPSULE, LIQUID FILLED ORAL 2 TIMES DAILY PRN
Qty: 60 CAPSULE | Refills: 2 | Status: SHIPPED | OUTPATIENT
Start: 2024-04-22

## 2024-04-22 RX ORDER — BENZOCAINE/MENTHOL 6 MG-10 MG
LOZENGE MUCOUS MEMBRANE 2 TIMES DAILY
Qty: 30 G | Refills: 2 | Status: SHIPPED | OUTPATIENT
Start: 2024-04-22

## 2024-04-22 RX ORDER — DIBUCAINE 1 G/100G
OINTMENT TOPICAL 4 TIMES DAILY PRN
Qty: 56 G | Refills: 2 | Status: SHIPPED | OUTPATIENT
Start: 2024-04-22

## 2024-04-22 RX ADMIN — ACETAMINOPHEN 975 MG: 325 TABLET, FILM COATED ORAL at 18:13

## 2024-04-22 ASSESSMENT — ACTIVITIES OF DAILY LIVING (ADL)
ADLS_ACUITY_SCORE: 18

## 2024-04-22 NOTE — PROGRESS NOTES
HOSPITAL TRIAGE NOTE  ===================    CHIEF COMPLAINT  ========================  Heike Musa is a 34 year old patient presenting today at 23w5d for evaluation of hemorrhoid pain.     Patient's last menstrual period was 2023.  Estimated Date of Delivery: Aug 14, 2024       HPI  ==================   Heike is here today due to severe hemorrhoid pain. She has been taking stool softener as needed, notes her stools being soft but she is still straining during these bowel movements. Her last bowel movement was this morning. Heike states this pain is very severe as she is not able to move her legs, walk, eat, or drink because of it. She has taken 250 mg of tylenol, which has not helped. Patient reports regular fetal movement, denies leaking of fluid or vaginal bleeding.   Denies fever, cough, SOB or chest pain. Denies having contact with anyone who is Covid-19 positive. Pt has not had Covid-19 Vaccinations.   Prenatal record and labs reviewed from [facility], through [facility].    CONTRACTIONS: none  ABDOMINAL PAIN: none  FETAL MOVEMENT: active    VAGINAL BLEEDING: none  RUPTURE OF MEMBRANES: no  PELVIC PAIN: none    PREGNANCY COMPLICATIONS: Gestational diabetes     # Pain Assessment:      3/14/2024    11:32 AM   Current Pain Score   Patient currently in pain? denies   - Heike is experiencing pain due to hemorrhoid. Pain management was discussed with Heike and her family and the plan was created in a collaborative fashion.  Heike's response to the current recommendations: engaged  compliant  - Pharmacologic adjuvants: Acetaminophen, lidocaine cream, preparation H cream, and witch hazel pads.       REVIEW OF SYSTEMS  =====================  C: NEGATIVE for fever, chills  I: NEGATIVE for worrisome rashes, moles or lesions  E: NEGATIVE for vision changes or irritation  R: NEGATIVE for significant cough or SOB  CV: NEGATIVE for chest pain, palpitations or varicosities  GI: NEGATIVE for nausea,  abdominal pain, heartburn. Reports straining with bowel movements and large external rectal hemorrhoid.  : NEGATIVE for frequency, dysuria, or hematuria  M: NEGATIVE for significant arthralgias or myalgia  N: NEGATIVE for headache, weakness, dizziness or paresthesias  P: NEGATIVE for changes in mood or affect    PROBLEM LIST  ===============  Patient Active Problem List    Diagnosis Date Noted    Flatulence, eructation and gas pain 2020     Priority: Medium     (normal spontaneous vaginal delivery) 2020     Priority: Medium     Anterior and medial episiotomy with extension to 3 degree partial 3a      Labor and delivery, indication for care 2020     Priority: Medium    Diet controlled gestational diabetes mellitus (GDM) in second trimester 2020     Priority: Medium     1 hour GCT 20  143,   20 3 HOUR :  88/(199)/(216)/(171)  Abnormal 3 out of 4.  20 Diabetic ed.      Encounter for supervision of normal first pregnancy in second trimester 2020     Priority: Medium     20 FRWC U/S;  20 weeks, PAL 20, nl fetal survey, placenta posterior and low lying repeat in 6-8 weeks  3/9/20 MFM U/S; 37 0/7weeks posterior placenta no longer low lying, male, 49%tile growth, 2 lbs 3 oz every 4 week growths due to GDM  20 Growth:  Cephalic fetus.  EFW 6# 9oz, 2966 gm, 74th growth percentile.  MVP 6.5.           Need for Tdap vaccination 12/10/2019     Priority: Medium     declined      Immunization counseling 2019     Priority: Medium     Immune to Hep A, hep B, and measles      Vertiginous migraine 2018     Priority: Medium       HISTORIES  ==============  ALLERGIES:    No Known Allergies  PAST MEDICAL HISTORY  Past Medical History:   Diagnosis Date    Migraines      SOCIAL HISTORY  Social History     Socioeconomic History    Marital status:      Spouse name: Not on file    Number of children: Not on file    Years of education: Not on file    Highest  education level: Not on file   Occupational History    Not on file   Tobacco Use    Smoking status: Never    Smokeless tobacco: Never   Substance and Sexual Activity    Alcohol use: No    Drug use: No    Sexual activity: Yes     Partners: Male   Other Topics Concern    Not on file   Social History Narrative    Not on file     Social Determinants of Health     Financial Resource Strain: Not on File (2022)    Received from [facility]     Financial Resource Strain     Financial Resource Strain: 0   Food Insecurity: Not on File (2022)    Received from [facility]     Food Insecurity     Food: 0   Transportation Needs: Not on File (2022)    Received from [facility]     Transportation Needs     Transportation: 0   Physical Activity: Not on File (2022)    Received from [facility]     Physical Activity     Physical Activity: 0   Stress: Not on File (2022)    Received from [facility]     Stress     Stress: 0   Social Connections: Not on File (2022)    Received from [facility]     Social Connections     Social Connections and Isolation: 0   Interpersonal Safety: Not At Risk (12/10/2019)    Humiliation, Afraid, Rape, and Kick questionnaire     Fear of Current or Ex-Partner: No     Emotionally Abused: No     Physically Abused: No     Sexually Abused: No   Housing Stability: Not on File (2022)    Received from [facility]     Housing Stability     [place]ous[place]     FAMILY HISTORY  Family History   Problem Relation Age of Onset    Hypertension Mother      OB HISTORY  OB History    Para Term  AB Living   2 1 1 0 0 1   SAB IAB Ectopic Multiple Live Births   0 0 0 0 1      # Outcome Date GA Lbr Nicolás/2nd Weight Sex Type Anes PTL Lv   2 Current            1 Term 20 40w0d 05:55 / 02:08 3.16 kg (6 lb 15.5 oz) M Vag-Spont EPI N CRISTAL      Name: BRADLEY,MALE-SHERIE      Apgar1: 8  Apgar5: 9     Prenatal Labs:   Lab Results   Component Value Date    ABO O 2020    RH Pos  2020    AS Negative 2024    RUQIGG Positive 2024    HEPBANG Nonreactive 2024    HGB 13.0 2024    HIAGAB Nonreactive 2023    GLU1 143 (H) 2020     Rubella- immune    ULTRASOUND(s) reviewed:   3/21/24: EFW 25%, AC 43%, suboptimal view of fetal anatomy. Follow-up US scheduled tomorrow 24.   EXAM  ============  LMP 2023   Breastfeeding No   GENERAL APPEARANCE: healthy, alert and mild distress due to pain  SKIN: no suspicious lesions or rashes  GI: 3.5cm x 1.5 cm, taught, tender, flesh colors hemorrhoid noted on the left lateral external rectal os.   NEURO: Denies headache, blurred vision, other vision changes  PSYCH: mentation appears normal. and affect normal/bright  MS/ LEGS: Reflexes normal bilaterally and 1+ edema     CONTRACTIONS: irreg   FETAL HEART TONES: initially 150 baseline with brief increase to baseline of 160, minimal variability, variable decelerations, no accelerations. This was while patient was on back and left side. Requested patient to turn to right side and resolution of decels was seen.   NST: NOT DONE  EFW: NA    PELVIC EXAM: deferred    ROM: no  ROMPLUS: not done    LABS: none    DIAGNOSIS  ============  23w5d seen [facility] the [facility] Triage severe hemorrhoid pain  NST: NOT DONE due to gestational age   Fetal Heart Tones:category two  Patient Active Problem List   Diagnosis    Vertiginous migraine    Immunization counseling    Need for Tdap vaccination    Encounter for supervision of normal first pregnancy in second trimester    Diet controlled gestational diabetes mellitus (GDM) in second trimester    Labor and delivery, indication for care    Flatulence, eructation and gas pain     (normal spontaneous vaginal delivery)       PLAN  ============  - Discussed fetal heart tracing is concerning and recommended extended monitoring in triage.   - Advised pushing oral fluids and eating since patient has not been eating or drinking due to pain.   -  Tylenol given, with some relief of pain. Dibucaine order, but delayed in pharmacy.   - Will plan TID sitz baths Dubucaine. BID hydrocortisone cream, colace and frequent witch hazel pads. Tylenol - discussed 650-1000mg  QID.   - Reviewed if pain worsens she should be seen immediately to evaluate if hemorrhoid has thrombosed.   - Care handed off to oncoming CNM.     I, KASHMIR Moy, am serving as a scribe; to document services personally performed by  SUSAN Meeks CNM based on data collection and the provider's statements to me.     SUSAN Meeks CNM    The encounter was performed by me and scribed by the SNM. The scribed note accurately reflects my personal services and decisions made by me.     SUSAN Meeks CNM

## 2024-04-23 ENCOUNTER — HOSPITAL ENCOUNTER (OUTPATIENT)
Dept: ULTRASOUND IMAGING | Facility: CLINIC | Age: 34
Discharge: HOME | End: 2024-04-23
Attending: OBSTETRICS & GYNECOLOGY
Payer: COMMERCIAL

## 2024-04-23 ENCOUNTER — OFFICE VISIT (OUTPATIENT)
Dept: MATERNAL FETAL MEDICINE | Facility: CLINIC | Age: 34
End: 2024-04-23
Attending: OBSTETRICS & GYNECOLOGY
Payer: COMMERCIAL

## 2024-04-23 DIAGNOSIS — O35.9XX0 SUSPECTED FETAL ANOMALY, ANTEPARTUM, SINGLE OR UNSPECIFIED FETUS: ICD-10-CM

## 2024-04-23 DIAGNOSIS — O24.410 DIET CONTROLLED GESTATIONAL DIABETES MELLITUS (GDM) IN SECOND TRIMESTER: Primary | ICD-10-CM

## 2024-04-23 DIAGNOSIS — Z03.73 FETAL ANOMALY SUSPECTED BUT NOT FOUND: ICD-10-CM

## 2024-04-23 PROCEDURE — 76816 OB US FOLLOW-UP PER FETUS: CPT

## 2024-04-23 PROCEDURE — 76816 OB US FOLLOW-UP PER FETUS: CPT | Mod: 26 | Performed by: OBSTETRICS & GYNECOLOGY

## 2024-04-23 NOTE — PLAN OF CARE
"Pt here for hemorrhoid pain. Has had hard stools for 3 days. Not currently taking any medication for constipation. Denies contractions, leaking or bleeding. During monitoring decelerations seen. Resolved with position change. Pt states \"not eating or drinking much today due to pain\". Encouraged to orally hydrate and eat some snacks. Tylenol given with decrease in pain. Continuous monitoring at this time. Call light in reach. Report given to Romy Maloney RN.    "

## 2024-04-23 NOTE — NURSING NOTE
Sb  ID [id number] used for today's ultrasound. Patient reports positive fetal movement, no pain, no contractions, leaking of fluid, or bleeding.  Reports blood sugar values fasting 100 and 2 hr post prandial better.  Patient is checking her blood sugars at home and will be bringing her values to her clinic visit on 4/29. Patient denies headache, visual changes, nausea/vomiting, epigastric pain related to preeclampsia.  Education provided to patient on todays.  SBAR given to JALEESA SUN, see their note in Epic.   Karrie Grigsby RN

## 2024-04-23 NOTE — DISCHARGE INSTRUCTIONS
When to Contact your Provider   If this is your first baby: Your contractions (tightening) are 5 minutes apart, last more than 1 minute, and have been consistently getting stronger for 1 hour or more  If this is your second baby or beyond: Your contractions are less than 10 minutes apart and have been consistently getting stronger for 1 hour or more  Feeling your baby move less than usual  Temperature of 100.4 F (38 C) or higher  New fluid leaking from your vagina  Other signs your provider asked you to look for in your body  Vaginal bleeding (bright red blood)  Swelling in your face or more swelling in your hands or legs  Headaches that don't get better after taking Tylenol (acetaminophen)  Changes in your vision (blurry or seeing spots or stars)  Nausea (sick to your stomach) and vomiting (throwing up)  Heartburn that doesn't go away  Sudden, bad belly pain that is unlike your contractions

## 2024-04-23 NOTE — PROGRESS NOTES
Subjective:  General appearance: comfortable, plan made for hemorrhoid management.Tracing reviewed with OBGYN team at 1900 on unit.    Objective:  CONTACTIONS: none  FETAL HEART TONES: continuous EFM- baseline 140 with minimal variability and no accelerations. No decelerations in last 120 min. Non-contiguous tracing due to fetal activity. EFM adjusted per RN  ROM: not ruptured  PELVIC EXAM: deferred    No results found for this or any previous visit (from the past 24 hour(s)).    ASSESSMENT:  ==============  IUP @ 23w5d for hemorrhoid pain evaluation, followed by cat II tracing in triage    Fetal Heart Tones: appropriate for gestational age since 1800  Patient Active Problem List   Diagnosis    Vertiginous migraine    Immunization counseling    Need for Tdap vaccination    Encounter for supervision of normal first pregnancy in second trimester    Diet controlled gestational diabetes mellitus (GDM) in second trimester    Labor and delivery, indication for care    Flatulence, eructation and gas pain     (normal spontaneous vaginal delivery)       PLAN:  ===========  - Reviewed tracing with Dr. Sy and Dr. Tanner, who recommended a full hour of monitoring prior to discharge considering the cat II tracing upon arrival. One hour of fetal heart monitoring completed without decelerations, ok to discharge per MD team after second review of tracing.    - Reviewed hemorrhoid management plan with patient. Dibucaine given x1 dose prior to discharge.   - Discharge to home with PTL instructions per discharge instruction form. Call or return to the [facility] with contractions, cramping, abdominal or pelvic pain, vaginal bleeding, leaking fluid or decreased fetal movement.  - MFM US tomorrow , for follow up to 3/21 US with suboptimal views.  - Follow up at your next clinic visit  at [facility]    Brianna ESPOSITO am serving as a scribe; to document services personally performed by Anjana Mcdonald CNM based on data  collection and the provider's statements to me.     KASHMIR Jaramillo    I agree with the PFSH and ROS as completed by Brianna Baumann except for changes made by me. The remainder of the encounter was performed by me and scribed by Brianna Baumann. The scribed note accurately reflects my personal services and decisions made by me.     SUSAN Flores CNM      An  was present (in-person/by phone/virtually) throughout the visit today. After discussing my assessment and treatment recommendations including risks and benefits with the patient/ patient s family, I asked the patient / family to convey what they understood about my assessment and recommendations to ensure understanding. The communication back from the patient/ family /parent, as relayed through the , confirmed understanding. The patient /family was also given time to have all questions answered.

## 2024-05-03 ENCOUNTER — APPOINTMENT (OUTPATIENT)
Dept: INTERPRETER SERVICES | Facility: CLINIC | Age: 34
End: 2024-05-03
Payer: COMMERCIAL

## 2024-05-31 ENCOUNTER — LAB REQUISITION (OUTPATIENT)
Dept: LAB | Facility: CLINIC | Age: 34
End: 2024-05-31
Payer: COMMERCIAL

## 2024-05-31 DIAGNOSIS — O09.93 SUPERVISION OF HIGH RISK PREGNANCY, UNSPECIFIED, THIRD TRIMESTER: ICD-10-CM

## 2024-05-31 LAB — T PALLIDUM AB SER QL: NONREACTIVE

## 2024-05-31 PROCEDURE — 86780 TREPONEMA PALLIDUM: CPT | Mod: ORL | Performed by: ADVANCED PRACTICE MIDWIFE

## 2024-05-31 PROCEDURE — 82306 VITAMIN D 25 HYDROXY: CPT | Mod: ORL | Performed by: ADVANCED PRACTICE MIDWIFE

## 2024-06-01 LAB — VIT D+METAB SERPL-MCNC: 26 NG/ML (ref 20–50)

## 2024-06-03 ENCOUNTER — APPOINTMENT (OUTPATIENT)
Dept: INTERPRETER SERVICES | Facility: CLINIC | Age: 34
End: 2024-06-03
Payer: COMMERCIAL

## 2024-06-06 ENCOUNTER — OFFICE VISIT (OUTPATIENT)
Dept: MATERNAL FETAL MEDICINE | Facility: CLINIC | Age: 34
End: 2024-06-06
Attending: OBSTETRICS & GYNECOLOGY

## 2024-06-06 ENCOUNTER — VIRTUAL VISIT (OUTPATIENT)
Dept: INTERPRETER SERVICES | Facility: CLINIC | Age: 34
End: 2024-06-06
Payer: COMMERCIAL

## 2024-06-06 ENCOUNTER — HOSPITAL ENCOUNTER (OUTPATIENT)
Dept: ULTRASOUND IMAGING | Facility: CLINIC | Age: 34
Discharge: HOME | End: 2024-06-06
Attending: OBSTETRICS & GYNECOLOGY

## 2024-06-06 DIAGNOSIS — O24.410 DIET CONTROLLED GESTATIONAL DIABETES MELLITUS (GDM) IN SECOND TRIMESTER: ICD-10-CM

## 2024-06-06 DIAGNOSIS — O24.410 DIET CONTROLLED GESTATIONAL DIABETES MELLITUS (GDM) IN SECOND TRIMESTER: Primary | ICD-10-CM

## 2024-06-06 PROCEDURE — 76816 OB US FOLLOW-UP PER FETUS: CPT

## 2024-06-06 PROCEDURE — T1013 SIGN LANG/ORAL INTERPRETER: HCPCS | Mod: GT,TEL

## 2024-06-06 PROCEDURE — 76816 OB US FOLLOW-UP PER FETUS: CPT | Mod: 26 | Performed by: OBSTETRICS & GYNECOLOGY

## 2024-06-06 NOTE — PROGRESS NOTES
The patient was seen for an ultrasound in the [facility] at the [facility] today.  For a detailed report of the ultrasound examination, please see the ultrasound report which can be found under the imaging tab.    If you have questions regarding today's evaluation or if we can be of further service, please contact the Maternal[facility]Fetal Medicine Center.    Alona Murry MD  , OB/GYN  Maternal-Fetal Medicine  [phone] (Pager)

## 2024-06-06 NOTE — NURSING NOTE
Dr. Murry reviewed the ultrasound and due to elevated blood sugars and patient not wanting to take insulin we would recommend 2x weekly BPP's starting at 32wks with a growth ultrasound in 4 wks. Patient did not want to schedule any follow up appointments with us at this time. States she has the phone number and will call later. She is follow up with her provider on 6/17/24 and will discuss further with them. P: Will await for patient phone call.

## 2024-06-06 NOTE — NURSING NOTE
".Patient reports positive fetal movement, no pain, no contractions, leaking of fluid, or bleeding.  Reports blood sugar values are elevated but did not do a fasting this morning. Discussed per the notes in her chart that insulin has been recommended but patient declining. Patient states that she will not be starting insulin and is going to work on her diet and exercise at this time. She didn't take insulin last time and it \"all worked out\".  Patient denies headache, visual changes, nausea/vomiting, epigastric pain related to preeclampsia.  Education provided to patient on growth ultrasound.  SBAR given to JALEESA SUN, see their note in Epic.     "

## 2024-07-08 DIAGNOSIS — O24.410 DIET CONTROLLED GESTATIONAL DIABETES MELLITUS (GDM) IN SECOND TRIMESTER: Primary | ICD-10-CM

## 2024-07-09 ENCOUNTER — TRANSCRIBE ORDERS (OUTPATIENT)
Dept: MATERNAL FETAL MEDICINE | Facility: CLINIC | Age: 34
End: 2024-07-09
Payer: COMMERCIAL

## 2024-07-09 DIAGNOSIS — O26.90 PREGNANCY RELATED CONDITION, ANTEPARTUM: Primary | ICD-10-CM

## 2024-07-17 ENCOUNTER — LAB REQUISITION (OUTPATIENT)
Dept: LAB | Facility: CLINIC | Age: 34
End: 2024-07-17

## 2024-07-17 DIAGNOSIS — O09.91 SUPERVISION OF HIGH RISK PREGNANCY, UNSPECIFIED, FIRST TRIMESTER: ICD-10-CM

## 2024-07-17 PROCEDURE — 87081 CULTURE SCREEN ONLY: CPT | Performed by: ADVANCED PRACTICE MIDWIFE

## 2024-07-20 LAB — BACTERIA SPEC CULT: NORMAL

## 2024-08-09 ENCOUNTER — OFFICE VISIT (OUTPATIENT)
Dept: MATERNAL FETAL MEDICINE | Facility: CLINIC | Age: 34
End: 2024-08-09
Attending: ADVANCED PRACTICE MIDWIFE

## 2024-08-09 ENCOUNTER — HOSPITAL ENCOUNTER (OUTPATIENT)
Dept: ULTRASOUND IMAGING | Facility: CLINIC | Age: 34
Discharge: HOME | End: 2024-08-09
Attending: ADVANCED PRACTICE MIDWIFE
Payer: COMMERCIAL

## 2024-08-09 DIAGNOSIS — O24.419 GESTATIONAL DIABETES MELLITUS (GDM) IN THIRD TRIMESTER, GESTATIONAL DIABETES METHOD OF CONTROL UNSPECIFIED: Primary | ICD-10-CM

## 2024-08-09 DIAGNOSIS — O26.90 PREGNANCY RELATED CONDITION, ANTEPARTUM: ICD-10-CM

## 2024-08-09 PROCEDURE — 76819 FETAL BIOPHYS PROFIL W/O NST: CPT | Mod: 26 | Performed by: OBSTETRICS & GYNECOLOGY

## 2024-08-09 PROCEDURE — 76819 FETAL BIOPHYS PROFIL W/O NST: CPT

## 2024-08-09 PROCEDURE — 76816 OB US FOLLOW-UP PER FETUS: CPT

## 2024-08-09 PROCEDURE — 76816 OB US FOLLOW-UP PER FETUS: CPT | Mod: 26 | Performed by: OBSTETRICS & GYNECOLOGY

## 2024-08-09 PROCEDURE — 99213 OFFICE O/P EST LOW 20 MIN: CPT | Mod: 25 | Performed by: OBSTETRICS & GYNECOLOGY

## 2024-08-09 NOTE — NURSING NOTE
Patient reports positive fetal movement, no pain, no contractions, leaking of fluid, or bleeding.  Reports blood sugar values fasting 91 and otherwise fine.  Patient denies headache, visual changes, nausea/vomiting, epigastric pain related to preeclampsia.  Education provided to patient on growth and BPP.  SBAR given to JALEESA SUN, see their note in Epic.

## 2024-08-12 ENCOUNTER — HOSPITAL ENCOUNTER (INPATIENT)
Facility: CLINIC | Age: 34
LOS: 4 days | Discharge: HOME | End: 2024-08-16
Attending: ADVANCED PRACTICE MIDWIFE | Admitting: OBSTETRICS & GYNECOLOGY
Payer: COMMERCIAL

## 2024-08-12 ENCOUNTER — ANESTHESIA EVENT (OUTPATIENT)
Dept: OBGYN | Facility: CLINIC | Age: 34
End: 2024-08-12
Payer: COMMERCIAL

## 2024-08-12 ENCOUNTER — ANESTHESIA (OUTPATIENT)
Dept: OBGYN | Facility: CLINIC | Age: 34
End: 2024-08-12
Payer: COMMERCIAL

## 2024-08-12 DIAGNOSIS — O14.13 SEVERE PRE-ECLAMPSIA IN THIRD TRIMESTER: ICD-10-CM

## 2024-08-12 DIAGNOSIS — Z98.891 S/P CESAREAN SECTION: Primary | ICD-10-CM

## 2024-08-12 PROBLEM — Z36.89 ENCOUNTER FOR TRIAGE IN PREGNANT PATIENT: Status: ACTIVE | Noted: 2024-08-12

## 2024-08-12 PROBLEM — O16.3 ELEVATED BLOOD PRESSURE COMPLICATING PREGNANCY IN THIRD TRIMESTER, ANTEPARTUM: Status: ACTIVE | Noted: 2024-08-12

## 2024-08-12 LAB
ABO/RH(D): NORMAL
ALBUMIN MFR UR ELPH: 12.7 MG/DL
ALBUMIN SERPL BCG-MCNC: 3.4 G/DL (ref 3.5–5.2)
ALP SERPL-CCNC: 208 U/L (ref 40–150)
ALT SERPL W P-5'-P-CCNC: 6 U/L (ref 0–50)
ANION GAP SERPL CALCULATED.3IONS-SCNC: 14 MMOL/L (ref 7–15)
ANTIBODY SCREEN: NEGATIVE
AST SERPL W P-5'-P-CCNC: 16 U/L (ref 0–45)
BILIRUB SERPL-MCNC: 0.6 MG/DL
BUN SERPL-MCNC: 8.6 MG/DL (ref 6–20)
CALCIUM SERPL-MCNC: 9.3 MG/DL (ref 8.8–10.4)
CHLORIDE SERPL-SCNC: 104 MMOL/L (ref 98–107)
CREAT SERPL-MCNC: 0.52 MG/DL (ref 0.51–0.95)
CREAT UR-MCNC: 33.4 MG/DL
EGFRCR SERPLBLD CKD-EPI 2021: >90 ML/MIN/1.73M2
ERYTHROCYTE [DISTWIDTH] IN BLOOD BY AUTOMATED COUNT: 13.3 % (ref 10–15)
GLUCOSE BLDC GLUCOMTR-MCNC: 72 MG/DL (ref 70–99)
GLUCOSE SERPL-MCNC: 70 MG/DL (ref 70–99)
HCO3 SERPL-SCNC: 18 MMOL/L (ref 22–29)
HCT VFR BLD AUTO: 35.5 % (ref 35–47)
HGB BLD-MCNC: 12.5 G/DL (ref 11.7–15.7)
MCH RBC QN AUTO: 31.2 PG (ref 26.5–33)
MCHC RBC AUTO-ENTMCNC: 35.2 G/DL (ref 31.5–36.5)
MCV RBC AUTO: 89 FL (ref 78–100)
PLATELET # BLD AUTO: 137 10E3/UL (ref 150–450)
POTASSIUM SERPL-SCNC: 4.1 MMOL/L (ref 3.4–5.3)
PROT SERPL-MCNC: 6.5 G/DL (ref 6.4–8.3)
PROT/CREAT 24H UR: 0.38 MG/MG CR (ref 0–0.2)
RBC # BLD AUTO: 4.01 10E6/UL (ref 3.8–5.2)
SODIUM SERPL-SCNC: 136 MMOL/L (ref 135–145)
SPECIMEN EXPIRATION DATE: NORMAL
WBC # BLD AUTO: 8.5 10E3/UL (ref 4–11)

## 2024-08-12 PROCEDURE — 258N000003 HC RX IP 258 OP 636: Performed by: ADVANCED PRACTICE MIDWIFE

## 2024-08-12 PROCEDURE — 80053 COMPREHEN METABOLIC PANEL: CPT | Performed by: ADVANCED PRACTICE MIDWIFE

## 2024-08-12 PROCEDURE — 250N000011 HC RX IP 250 OP 636

## 2024-08-12 PROCEDURE — 84156 ASSAY OF PROTEIN URINE: CPT | Performed by: ADVANCED PRACTICE MIDWIFE

## 2024-08-12 PROCEDURE — G0463 HOSPITAL OUTPT CLINIC VISIT: HCPCS

## 2024-08-12 PROCEDURE — 86900 BLOOD TYPING SEROLOGIC ABO: CPT | Performed by: ADVANCED PRACTICE MIDWIFE

## 2024-08-12 PROCEDURE — 82962 GLUCOSE BLOOD TEST: CPT

## 2024-08-12 PROCEDURE — 120N000002 HC R&B MED SURG/OB UMMC

## 2024-08-12 PROCEDURE — 250N000013 HC RX MED GY IP 250 OP 250 PS 637: Performed by: ADVANCED PRACTICE MIDWIFE

## 2024-08-12 PROCEDURE — 85027 COMPLETE CBC AUTOMATED: CPT | Performed by: ADVANCED PRACTICE MIDWIFE

## 2024-08-12 PROCEDURE — 250N000011 HC RX IP 250 OP 636: Mod: JZ | Performed by: ADVANCED PRACTICE MIDWIFE

## 2024-08-12 PROCEDURE — 250N000013 HC RX MED GY IP 250 OP 250 PS 637

## 2024-08-12 RX ORDER — LOPERAMIDE HCL 2 MG
2 CAPSULE ORAL
Status: DISCONTINUED | OUTPATIENT
Start: 2024-08-12 | End: 2024-08-13 | Stop reason: HOSPADM

## 2024-08-12 RX ORDER — SODIUM CHLORIDE, SODIUM LACTATE, POTASSIUM CHLORIDE, CALCIUM CHLORIDE 600; 310; 30; 20 MG/100ML; MG/100ML; MG/100ML; MG/100ML
INJECTION, SOLUTION INTRAVENOUS CONTINUOUS
Status: DISCONTINUED | OUTPATIENT
Start: 2024-08-13 | End: 2024-08-13 | Stop reason: HOSPADM

## 2024-08-12 RX ORDER — METOCLOPRAMIDE 10 MG/1
10 TABLET ORAL EVERY 6 HOURS PRN
Status: DISCONTINUED | OUTPATIENT
Start: 2024-08-12 | End: 2024-08-13 | Stop reason: HOSPADM

## 2024-08-12 RX ORDER — LOPERAMIDE HCL 2 MG
4 CAPSULE ORAL
Status: DISCONTINUED | OUTPATIENT
Start: 2024-08-12 | End: 2024-08-13 | Stop reason: HOSPADM

## 2024-08-12 RX ORDER — OXYTOCIN 10 [USP'U]/ML
10 INJECTION, SOLUTION INTRAMUSCULAR; INTRAVENOUS
OUTPATIENT
Start: 2024-08-12

## 2024-08-12 RX ORDER — NALOXONE HYDROCHLORIDE 0.4 MG/ML
0.4 INJECTION, SOLUTION INTRAMUSCULAR; INTRAVENOUS; SUBCUTANEOUS
Status: DISCONTINUED | OUTPATIENT
Start: 2024-08-12 | End: 2024-08-13 | Stop reason: HOSPADM

## 2024-08-12 RX ORDER — METOCLOPRAMIDE HYDROCHLORIDE 5 MG/ML
10 INJECTION INTRAMUSCULAR; INTRAVENOUS EVERY 6 HOURS PRN
Status: DISCONTINUED | OUTPATIENT
Start: 2024-08-12 | End: 2024-08-13 | Stop reason: HOSPADM

## 2024-08-12 RX ORDER — KETOROLAC TROMETHAMINE 30 MG/ML
30 INJECTION, SOLUTION INTRAMUSCULAR; INTRAVENOUS
Status: DISCONTINUED | OUTPATIENT
Start: 2024-08-12 | End: 2024-08-13

## 2024-08-12 RX ORDER — TRANEXAMIC ACID 10 MG/ML
1 INJECTION, SOLUTION INTRAVENOUS EVERY 30 MIN PRN
Status: DISCONTINUED | OUTPATIENT
Start: 2024-08-12 | End: 2024-08-13 | Stop reason: HOSPADM

## 2024-08-12 RX ORDER — OXYTOCIN 10 [USP'U]/ML
10 INJECTION, SOLUTION INTRAMUSCULAR; INTRAVENOUS
Status: DISCONTINUED | OUTPATIENT
Start: 2024-08-12 | End: 2024-08-13 | Stop reason: HOSPADM

## 2024-08-12 RX ORDER — MAGNESIUM SULFATE HEPTAHYDRATE 40 MG/ML
2 INJECTION, SOLUTION INTRAVENOUS
Status: DISCONTINUED | OUTPATIENT
Start: 2024-08-12 | End: 2024-08-16 | Stop reason: HOSPADM

## 2024-08-12 RX ORDER — SODIUM CHLORIDE, SODIUM LACTATE, POTASSIUM CHLORIDE, CALCIUM CHLORIDE 600; 310; 30; 20 MG/100ML; MG/100ML; MG/100ML; MG/100ML
INJECTION, SOLUTION INTRAVENOUS
Status: COMPLETED
Start: 2024-08-12 | End: 2024-08-12

## 2024-08-12 RX ORDER — LABETALOL HYDROCHLORIDE 5 MG/ML
20-80 INJECTION, SOLUTION INTRAVENOUS EVERY 10 MIN PRN
Status: DISCONTINUED | OUTPATIENT
Start: 2024-08-12 | End: 2024-08-16 | Stop reason: HOSPADM

## 2024-08-12 RX ORDER — PROCHLORPERAZINE MALEATE 10 MG
10 TABLET ORAL EVERY 6 HOURS PRN
Status: DISCONTINUED | OUTPATIENT
Start: 2024-08-12 | End: 2024-08-13 | Stop reason: HOSPADM

## 2024-08-12 RX ORDER — NALOXONE HYDROCHLORIDE 0.4 MG/ML
0.2 INJECTION, SOLUTION INTRAMUSCULAR; INTRAVENOUS; SUBCUTANEOUS
Status: DISCONTINUED | OUTPATIENT
Start: 2024-08-12 | End: 2024-08-13 | Stop reason: HOSPADM

## 2024-08-12 RX ORDER — CARBOPROST TROMETHAMINE 250 UG/ML
250 INJECTION, SOLUTION INTRAMUSCULAR
Status: DISCONTINUED | OUTPATIENT
Start: 2024-08-12 | End: 2024-08-13 | Stop reason: HOSPADM

## 2024-08-12 RX ORDER — ONDANSETRON 4 MG/1
4 TABLET, ORALLY DISINTEGRATING ORAL EVERY 6 HOURS PRN
Status: DISCONTINUED | OUTPATIENT
Start: 2024-08-12 | End: 2024-08-12 | Stop reason: HOSPADM

## 2024-08-12 RX ORDER — MAGNESIUM SULFATE HEPTAHYDRATE 40 MG/ML
4 INJECTION, SOLUTION INTRAVENOUS
Status: DISCONTINUED | OUTPATIENT
Start: 2024-08-12 | End: 2024-08-16 | Stop reason: HOSPADM

## 2024-08-12 RX ORDER — MAGNESIUM SULFATE HEPTAHYDRATE 40 MG/ML
4 INJECTION, SOLUTION INTRAVENOUS ONCE
Status: COMPLETED | OUTPATIENT
Start: 2024-08-12 | End: 2024-08-12

## 2024-08-12 RX ORDER — METHYLERGONOVINE MALEATE 0.2 MG/ML
200 INJECTION INTRAVENOUS
Status: DISCONTINUED | OUTPATIENT
Start: 2024-08-12 | End: 2024-08-13 | Stop reason: HOSPADM

## 2024-08-12 RX ORDER — OXYTOCIN/0.9 % SODIUM CHLORIDE 30/500 ML
340 PLASTIC BAG, INJECTION (ML) INTRAVENOUS CONTINUOUS PRN
Status: DISCONTINUED | OUTPATIENT
Start: 2024-08-12 | End: 2024-08-13 | Stop reason: HOSPADM

## 2024-08-12 RX ORDER — MISOPROSTOL 200 UG/1
800 TABLET ORAL
Status: DISCONTINUED | OUTPATIENT
Start: 2024-08-12 | End: 2024-08-13 | Stop reason: HOSPADM

## 2024-08-12 RX ORDER — LABETALOL HYDROCHLORIDE 5 MG/ML
INJECTION, SOLUTION INTRAVENOUS
Status: COMPLETED
Start: 2024-08-12 | End: 2024-08-12

## 2024-08-12 RX ORDER — OXYTOCIN/0.9 % SODIUM CHLORIDE 30/500 ML
100-340 PLASTIC BAG, INJECTION (ML) INTRAVENOUS CONTINUOUS PRN
Status: DISCONTINUED | OUTPATIENT
Start: 2024-08-12 | End: 2024-08-16 | Stop reason: HOSPADM

## 2024-08-12 RX ORDER — PROCHLORPERAZINE 25 MG
25 SUPPOSITORY, RECTAL RECTAL EVERY 12 HOURS PRN
Status: DISCONTINUED | OUTPATIENT
Start: 2024-08-12 | End: 2024-08-13 | Stop reason: HOSPADM

## 2024-08-12 RX ORDER — LIDOCAINE 40 MG/G
CREAM TOPICAL
Status: DISCONTINUED | OUTPATIENT
Start: 2024-08-12 | End: 2024-08-14

## 2024-08-12 RX ORDER — MISOPROSTOL 200 UG/1
400 TABLET ORAL
Status: DISCONTINUED | OUTPATIENT
Start: 2024-08-12 | End: 2024-08-13 | Stop reason: HOSPADM

## 2024-08-12 RX ORDER — CALCIUM GLUCONATE 94 MG/ML
1 INJECTION, SOLUTION INTRAVENOUS
Status: DISCONTINUED | OUTPATIENT
Start: 2024-08-12 | End: 2024-08-16 | Stop reason: HOSPADM

## 2024-08-12 RX ORDER — MAGNESIUM SULFATE IN WATER 40 MG/ML
2 INJECTION, SOLUTION INTRAVENOUS CONTINUOUS
Status: DISPENSED | OUTPATIENT
Start: 2024-08-12 | End: 2024-08-14

## 2024-08-12 RX ORDER — CARBOPROST TROMETHAMINE 250 UG/ML
INJECTION, SOLUTION INTRAMUSCULAR
Status: DISCONTINUED
Start: 2024-08-12 | End: 2024-08-13 | Stop reason: WASHOUT

## 2024-08-12 RX ORDER — METOCLOPRAMIDE HYDROCHLORIDE 5 MG/ML
10 INJECTION INTRAMUSCULAR; INTRAVENOUS EVERY 6 HOURS PRN
Status: DISCONTINUED | OUTPATIENT
Start: 2024-08-12 | End: 2024-08-12 | Stop reason: HOSPADM

## 2024-08-12 RX ORDER — ONDANSETRON 2 MG/ML
4 INJECTION INTRAMUSCULAR; INTRAVENOUS EVERY 6 HOURS PRN
Status: DISCONTINUED | OUTPATIENT
Start: 2024-08-12 | End: 2024-08-13 | Stop reason: HOSPADM

## 2024-08-12 RX ORDER — LIDOCAINE 40 MG/G
CREAM TOPICAL
Status: DISCONTINUED | OUTPATIENT
Start: 2024-08-12 | End: 2024-08-13 | Stop reason: HOSPADM

## 2024-08-12 RX ORDER — ONDANSETRON 2 MG/ML
4 INJECTION INTRAMUSCULAR; INTRAVENOUS EVERY 6 HOURS PRN
Status: DISCONTINUED | OUTPATIENT
Start: 2024-08-12 | End: 2024-08-12 | Stop reason: HOSPADM

## 2024-08-12 RX ORDER — IBUPROFEN 800 MG/1
800 TABLET, FILM COATED ORAL
Status: DISCONTINUED | OUTPATIENT
Start: 2024-08-12 | End: 2024-08-13

## 2024-08-12 RX ORDER — PROCHLORPERAZINE 25 MG
25 SUPPOSITORY, RECTAL RECTAL EVERY 12 HOURS PRN
Status: DISCONTINUED | OUTPATIENT
Start: 2024-08-12 | End: 2024-08-12 | Stop reason: HOSPADM

## 2024-08-12 RX ORDER — HYDROXYZINE HYDROCHLORIDE 50 MG/1
50 TABLET, FILM COATED ORAL EVERY 6 HOURS PRN
Status: DISCONTINUED | OUTPATIENT
Start: 2024-08-12 | End: 2024-08-12 | Stop reason: HOSPADM

## 2024-08-12 RX ORDER — CEFAZOLIN SODIUM/WATER 2 G/20 ML
2 SYRINGE (ML) INTRAVENOUS
Status: DISCONTINUED | OUTPATIENT
Start: 2024-08-12 | End: 2024-08-13 | Stop reason: HOSPADM

## 2024-08-12 RX ORDER — ACETAMINOPHEN 325 MG/1
975 TABLET ORAL ONCE
Status: COMPLETED | OUTPATIENT
Start: 2024-08-13 | End: 2024-08-12

## 2024-08-12 RX ORDER — HYDRALAZINE HYDROCHLORIDE 20 MG/ML
10 INJECTION INTRAMUSCULAR; INTRAVENOUS
Status: DISCONTINUED | OUTPATIENT
Start: 2024-08-12 | End: 2024-08-16 | Stop reason: HOSPADM

## 2024-08-12 RX ORDER — PROCHLORPERAZINE MALEATE 10 MG
10 TABLET ORAL EVERY 6 HOURS PRN
Status: DISCONTINUED | OUTPATIENT
Start: 2024-08-12 | End: 2024-08-12 | Stop reason: HOSPADM

## 2024-08-12 RX ORDER — ONDANSETRON 4 MG/1
4 TABLET, ORALLY DISINTEGRATING ORAL EVERY 6 HOURS PRN
Status: DISCONTINUED | OUTPATIENT
Start: 2024-08-12 | End: 2024-08-13 | Stop reason: HOSPADM

## 2024-08-12 RX ORDER — CEFAZOLIN SODIUM/WATER 2 G/20 ML
2 SYRINGE (ML) INTRAVENOUS SEE ADMIN INSTRUCTIONS
Status: DISCONTINUED | OUTPATIENT
Start: 2024-08-12 | End: 2024-08-13 | Stop reason: HOSPADM

## 2024-08-12 RX ORDER — CITRIC ACID/SODIUM CITRATE 334-500MG
SOLUTION, ORAL ORAL
Status: COMPLETED
Start: 2024-08-12 | End: 2024-08-12

## 2024-08-12 RX ORDER — TERBUTALINE SULFATE 1 MG/ML
0.25 INJECTION, SOLUTION SUBCUTANEOUS
Status: DISCONTINUED | OUTPATIENT
Start: 2024-08-12 | End: 2024-08-12 | Stop reason: HOSPADM

## 2024-08-12 RX ORDER — LIDOCAINE 40 MG/G
CREAM TOPICAL
Status: DISCONTINUED | OUTPATIENT
Start: 2024-08-12 | End: 2024-08-12 | Stop reason: HOSPADM

## 2024-08-12 RX ORDER — CITRIC ACID/SODIUM CITRATE 334-500MG
30 SOLUTION, ORAL ORAL
Status: DISCONTINUED | OUTPATIENT
Start: 2024-08-12 | End: 2024-08-13 | Stop reason: HOSPADM

## 2024-08-12 RX ORDER — OXYTOCIN 10 [USP'U]/ML
10 INJECTION, SOLUTION INTRAMUSCULAR; INTRAVENOUS
Status: DISCONTINUED | OUTPATIENT
Start: 2024-08-12 | End: 2024-08-16 | Stop reason: HOSPADM

## 2024-08-12 RX ORDER — OXYTOCIN/0.9 % SODIUM CHLORIDE 30/500 ML
100-340 PLASTIC BAG, INJECTION (ML) INTRAVENOUS CONTINUOUS PRN
OUTPATIENT
Start: 2024-08-12

## 2024-08-12 RX ORDER — SODIUM CHLORIDE, SODIUM LACTATE, POTASSIUM CHLORIDE, CALCIUM CHLORIDE 600; 310; 30; 20 MG/100ML; MG/100ML; MG/100ML; MG/100ML
10-125 INJECTION, SOLUTION INTRAVENOUS CONTINUOUS
Status: DISCONTINUED | OUTPATIENT
Start: 2024-08-12 | End: 2024-08-16 | Stop reason: HOSPADM

## 2024-08-12 RX ORDER — CITRIC ACID/SODIUM CITRATE 334-500MG
30 SOLUTION, ORAL ORAL
Status: COMPLETED | OUTPATIENT
Start: 2024-08-12 | End: 2024-08-12

## 2024-08-12 RX ORDER — METOCLOPRAMIDE 10 MG/1
10 TABLET ORAL EVERY 6 HOURS PRN
Status: DISCONTINUED | OUTPATIENT
Start: 2024-08-12 | End: 2024-08-12 | Stop reason: HOSPADM

## 2024-08-12 RX ADMIN — LABETALOL HYDROCHLORIDE 20 MG: 5 INJECTION, SOLUTION INTRAVENOUS at 22:46

## 2024-08-12 RX ADMIN — MAGNESIUM SULFATE HEPTAHYDRATE 4 G: 40 INJECTION, SOLUTION INTRAVENOUS at 23:00

## 2024-08-12 RX ADMIN — ACETAMINOPHEN 975 MG: 325 TABLET ORAL at 23:36

## 2024-08-12 RX ADMIN — SODIUM CHLORIDE, SODIUM LACTATE, POTASSIUM CHLORIDE, CALCIUM CHLORIDE 125 ML/HR: 600; 310; 30; 20 INJECTION, SOLUTION INTRAVENOUS at 23:01

## 2024-08-12 RX ADMIN — SODIUM CHLORIDE, POTASSIUM CHLORIDE, SODIUM LACTATE AND CALCIUM CHLORIDE 125 ML/HR: 600; 310; 30; 20 INJECTION, SOLUTION INTRAVENOUS at 23:01

## 2024-08-12 RX ADMIN — SODIUM CITRATE AND CITRIC ACID MONOHYDRATE 30 ML: 500; 334 SOLUTION ORAL at 23:36

## 2024-08-12 RX ADMIN — MAGNESIUM SULFATE HEPTAHYDRATE 2 G/HR: 40 INJECTION, SOLUTION INTRAVENOUS at 23:40

## 2024-08-12 RX ADMIN — Medication 30 ML: at 23:36

## 2024-08-12 RX ADMIN — LABETALOL HYDROCHLORIDE 40 MG: 5 INJECTION, SOLUTION INTRAVENOUS at 23:05

## 2024-08-12 ASSESSMENT — ACTIVITIES OF DAILY LIVING (ADL)
ADLS_ACUITY_SCORE: 16

## 2024-08-13 ENCOUNTER — OFFICE VISIT (OUTPATIENT)
Dept: INTERPRETER SERVICES | Facility: CLINIC | Age: 34
End: 2024-08-13
Payer: COMMERCIAL

## 2024-08-13 ENCOUNTER — APPOINTMENT (OUTPATIENT)
Dept: INTERPRETER SERVICES | Facility: CLINIC | Age: 34
End: 2024-08-13
Payer: COMMERCIAL

## 2024-08-13 LAB
ALT SERPL W P-5'-P-CCNC: <5 U/L (ref 0–50)
AST SERPL W P-5'-P-CCNC: 12 U/L (ref 0–45)
CREAT SERPL-MCNC: 0.49 MG/DL (ref 0.51–0.95)
EGFRCR SERPLBLD CKD-EPI 2021: >90 ML/MIN/1.73M2
ERYTHROCYTE [DISTWIDTH] IN BLOOD BY AUTOMATED COUNT: 13.3 % (ref 10–15)
GLUCOSE BLDC GLUCOMTR-MCNC: 133 MG/DL (ref 70–99)
HCT VFR BLD AUTO: 32.7 % (ref 35–47)
HGB BLD-MCNC: 11.2 G/DL (ref 11.7–15.7)
MCH RBC QN AUTO: 31.1 PG (ref 26.5–33)
MCHC RBC AUTO-ENTMCNC: 34.3 G/DL (ref 31.5–36.5)
MCV RBC AUTO: 91 FL (ref 78–100)
PLATELET # BLD AUTO: 128 10E3/UL (ref 150–450)
RBC # BLD AUTO: 3.6 10E6/UL (ref 3.8–5.2)
WBC # BLD AUTO: 10.6 10E3/UL (ref 4–11)

## 2024-08-13 PROCEDURE — 59514 CESAREAN DELIVERY ONLY: CPT | Mod: GC | Performed by: OBSTETRICS & GYNECOLOGY

## 2024-08-13 PROCEDURE — 360N000076 HC SURGERY LEVEL 3, PER MIN: Performed by: OBSTETRICS & GYNECOLOGY

## 2024-08-13 PROCEDURE — 84450 TRANSFERASE (AST) (SGOT): CPT

## 2024-08-13 PROCEDURE — 710N000010 HC RECOVERY PHASE 1, LEVEL 2, PER MIN: Performed by: OBSTETRICS & GYNECOLOGY

## 2024-08-13 PROCEDURE — C9290 INJ, BUPIVACAINE LIPOSOME: HCPCS | Performed by: ANESTHESIOLOGY

## 2024-08-13 PROCEDURE — 250N000011 HC RX IP 250 OP 636

## 2024-08-13 PROCEDURE — 271N000001 HC OR GENERAL SUPPLY NON-STERILE: Performed by: OBSTETRICS & GYNECOLOGY

## 2024-08-13 PROCEDURE — 999N000141 HC STATISTIC PRE-PROCEDURE NURSING ASSESSMENT: Performed by: OBSTETRICS & GYNECOLOGY

## 2024-08-13 PROCEDURE — 84460 ALANINE AMINO (ALT) (SGPT): CPT

## 2024-08-13 PROCEDURE — 250N000011 HC RX IP 250 OP 636: Performed by: STUDENT IN AN ORGANIZED HEALTH CARE EDUCATION/TRAINING PROGRAM

## 2024-08-13 PROCEDURE — 85027 COMPLETE CBC AUTOMATED: CPT

## 2024-08-13 PROCEDURE — 999N000016 HC STATISTIC ATTENDANCE AT DELIVERY

## 2024-08-13 PROCEDURE — 36415 COLL VENOUS BLD VENIPUNCTURE: CPT

## 2024-08-13 PROCEDURE — T1013 SIGN LANG/ORAL INTERPRETER: HCPCS | Mod: U3

## 2024-08-13 PROCEDURE — 250N000013 HC RX MED GY IP 250 OP 250 PS 637

## 2024-08-13 PROCEDURE — 258N000003 HC RX IP 258 OP 636: Performed by: OBSTETRICS & GYNECOLOGY

## 2024-08-13 PROCEDURE — 370N000017 HC ANESTHESIA TECHNICAL FEE, PER MIN: Performed by: OBSTETRICS & GYNECOLOGY

## 2024-08-13 PROCEDURE — 272N000001 HC OR GENERAL SUPPLY STERILE: Performed by: OBSTETRICS & GYNECOLOGY

## 2024-08-13 PROCEDURE — 82565 ASSAY OF CREATININE: CPT

## 2024-08-13 PROCEDURE — 59514 CESAREAN DELIVERY ONLY: CPT | Performed by: ANESTHESIOLOGY

## 2024-08-13 PROCEDURE — 258N000003 HC RX IP 258 OP 636: Performed by: STUDENT IN AN ORGANIZED HEALTH CARE EDUCATION/TRAINING PROGRAM

## 2024-08-13 PROCEDURE — 120N000002 HC R&B MED SURG/OB UMMC

## 2024-08-13 PROCEDURE — 250N000011 HC RX IP 250 OP 636: Performed by: ANESTHESIOLOGY

## 2024-08-13 PROCEDURE — 250N000009 HC RX 250: Performed by: STUDENT IN AN ORGANIZED HEALTH CARE EDUCATION/TRAINING PROGRAM

## 2024-08-13 RX ORDER — DIPHENHYDRAMINE HCL 25 MG
25 CAPSULE ORAL EVERY 6 HOURS PRN
Status: DISCONTINUED | OUTPATIENT
Start: 2024-08-13 | End: 2024-08-16 | Stop reason: HOSPADM

## 2024-08-13 RX ORDER — ONDANSETRON 2 MG/ML
4 INJECTION INTRAMUSCULAR; INTRAVENOUS EVERY 30 MIN PRN
Status: DISCONTINUED | OUTPATIENT
Start: 2024-08-13 | End: 2024-08-13 | Stop reason: HOSPADM

## 2024-08-13 RX ORDER — NALOXONE HYDROCHLORIDE 0.4 MG/ML
0.1 INJECTION, SOLUTION INTRAMUSCULAR; INTRAVENOUS; SUBCUTANEOUS
Status: DISCONTINUED | OUTPATIENT
Start: 2024-08-13 | End: 2024-08-13 | Stop reason: HOSPADM

## 2024-08-13 RX ORDER — OXYTOCIN/0.9 % SODIUM CHLORIDE 30/500 ML
PLASTIC BAG, INJECTION (ML) INTRAVENOUS PRN
Status: DISCONTINUED | OUTPATIENT
Start: 2024-08-13 | End: 2024-08-13

## 2024-08-13 RX ORDER — PROCHLORPERAZINE 25 MG
25 SUPPOSITORY, RECTAL RECTAL EVERY 12 HOURS PRN
Status: DISCONTINUED | OUTPATIENT
Start: 2024-08-13 | End: 2024-08-16 | Stop reason: HOSPADM

## 2024-08-13 RX ORDER — HYDROMORPHONE HCL IN WATER/PF 6 MG/30 ML
0.2 PATIENT CONTROLLED ANALGESIA SYRINGE INTRAVENOUS EVERY 5 MIN PRN
Status: DISCONTINUED | OUTPATIENT
Start: 2024-08-13 | End: 2024-08-13 | Stop reason: HOSPADM

## 2024-08-13 RX ORDER — NIFEDIPINE 30 MG/1
30 TABLET, EXTENDED RELEASE ORAL DAILY
Status: DISCONTINUED | OUTPATIENT
Start: 2024-08-13 | End: 2024-08-13

## 2024-08-13 RX ORDER — FENTANYL CITRATE 50 UG/ML
25 INJECTION, SOLUTION INTRAMUSCULAR; INTRAVENOUS EVERY 5 MIN PRN
Status: DISCONTINUED | OUTPATIENT
Start: 2024-08-13 | End: 2024-08-13 | Stop reason: HOSPADM

## 2024-08-13 RX ORDER — ONDANSETRON 4 MG/1
4 TABLET, ORALLY DISINTEGRATING ORAL EVERY 6 HOURS PRN
Status: DISCONTINUED | OUTPATIENT
Start: 2024-08-13 | End: 2024-08-16 | Stop reason: HOSPADM

## 2024-08-13 RX ORDER — OXYTOCIN/0.9 % SODIUM CHLORIDE 30/500 ML
PLASTIC BAG, INJECTION (ML) INTRAVENOUS CONTINUOUS PRN
Status: DISCONTINUED | OUTPATIENT
Start: 2024-08-13 | End: 2024-08-13

## 2024-08-13 RX ORDER — BUPIVACAINE HYDROCHLORIDE 7.5 MG/ML
INJECTION, SOLUTION INTRASPINAL
Status: COMPLETED | OUTPATIENT
Start: 2024-08-13 | End: 2024-08-13

## 2024-08-13 RX ORDER — AMOXICILLIN 250 MG
2 CAPSULE ORAL 2 TIMES DAILY
Status: DISCONTINUED | OUTPATIENT
Start: 2024-08-13 | End: 2024-08-16 | Stop reason: HOSPADM

## 2024-08-13 RX ORDER — DIPHENHYDRAMINE HYDROCHLORIDE 50 MG/ML
25 INJECTION INTRAMUSCULAR; INTRAVENOUS EVERY 6 HOURS PRN
Status: DISCONTINUED | OUTPATIENT
Start: 2024-08-13 | End: 2024-08-16 | Stop reason: HOSPADM

## 2024-08-13 RX ORDER — HYDROCORTISONE 25 MG/G
CREAM TOPICAL 3 TIMES DAILY PRN
Status: DISCONTINUED | OUTPATIENT
Start: 2024-08-13 | End: 2024-08-16 | Stop reason: HOSPADM

## 2024-08-13 RX ORDER — OXYCODONE HYDROCHLORIDE 5 MG/1
5 TABLET ORAL EVERY 4 HOURS PRN
Status: DISCONTINUED | OUTPATIENT
Start: 2024-08-13 | End: 2024-08-14

## 2024-08-13 RX ORDER — DEXTROSE MONOHYDRATE 25 G/50ML
25-50 INJECTION, SOLUTION INTRAVENOUS
Status: DISCONTINUED | OUTPATIENT
Start: 2024-08-13 | End: 2024-08-16 | Stop reason: HOSPADM

## 2024-08-13 RX ORDER — SODIUM PHOSPHATE,MONO-DIBASIC 19G-7G/118
1 ENEMA (ML) RECTAL DAILY PRN
Status: DISCONTINUED | OUTPATIENT
Start: 2024-08-15 | End: 2024-08-16 | Stop reason: HOSPADM

## 2024-08-13 RX ORDER — LOPERAMIDE HCL 2 MG
4 CAPSULE ORAL
Status: DISCONTINUED | OUTPATIENT
Start: 2024-08-13 | End: 2024-08-16 | Stop reason: HOSPADM

## 2024-08-13 RX ORDER — ONDANSETRON 2 MG/ML
4 INJECTION INTRAMUSCULAR; INTRAVENOUS EVERY 6 HOURS PRN
Status: DISCONTINUED | OUTPATIENT
Start: 2024-08-13 | End: 2024-08-16 | Stop reason: HOSPADM

## 2024-08-13 RX ORDER — MISOPROSTOL 200 UG/1
800 TABLET ORAL
Status: DISCONTINUED | OUTPATIENT
Start: 2024-08-13 | End: 2024-08-16 | Stop reason: HOSPADM

## 2024-08-13 RX ORDER — MISOPROSTOL 200 UG/1
400 TABLET ORAL
Status: DISCONTINUED | OUTPATIENT
Start: 2024-08-13 | End: 2024-08-16 | Stop reason: HOSPADM

## 2024-08-13 RX ORDER — DEXTROSE, SODIUM CHLORIDE, SODIUM LACTATE, POTASSIUM CHLORIDE, AND CALCIUM CHLORIDE 5; .6; .31; .03; .02 G/100ML; G/100ML; G/100ML; G/100ML; G/100ML
INJECTION, SOLUTION INTRAVENOUS CONTINUOUS
Status: DISCONTINUED | OUTPATIENT
Start: 2024-08-13 | End: 2024-08-16 | Stop reason: HOSPADM

## 2024-08-13 RX ORDER — MORPHINE SULFATE 1 MG/ML
INJECTION, SOLUTION EPIDURAL; INTRATHECAL; INTRAVENOUS
Status: COMPLETED | OUTPATIENT
Start: 2024-08-13 | End: 2024-08-13

## 2024-08-13 RX ORDER — FENTANYL CITRATE 50 UG/ML
INJECTION, SOLUTION INTRAMUSCULAR; INTRAVENOUS
Status: COMPLETED | OUTPATIENT
Start: 2024-08-13 | End: 2024-08-13

## 2024-08-13 RX ORDER — FENTANYL CITRATE 50 UG/ML
50 INJECTION, SOLUTION INTRAMUSCULAR; INTRAVENOUS EVERY 5 MIN PRN
Status: DISCONTINUED | OUTPATIENT
Start: 2024-08-13 | End: 2024-08-13 | Stop reason: HOSPADM

## 2024-08-13 RX ORDER — EPHEDRINE SULFATE 50 MG/ML
INJECTION, SOLUTION INTRAMUSCULAR; INTRAVENOUS; SUBCUTANEOUS PRN
Status: DISCONTINUED | OUTPATIENT
Start: 2024-08-13 | End: 2024-08-13

## 2024-08-13 RX ORDER — NIFEDIPINE 30 MG/1
60 TABLET, EXTENDED RELEASE ORAL EVERY 24 HOURS
Status: DISCONTINUED | OUTPATIENT
Start: 2024-08-14 | End: 2024-08-16 | Stop reason: HOSPADM

## 2024-08-13 RX ORDER — KETOROLAC TROMETHAMINE 30 MG/ML
30 INJECTION, SOLUTION INTRAMUSCULAR; INTRAVENOUS EVERY 6 HOURS
Status: COMPLETED | OUTPATIENT
Start: 2024-08-13 | End: 2024-08-13

## 2024-08-13 RX ORDER — METHYLERGONOVINE MALEATE 0.2 MG/ML
200 INJECTION INTRAVENOUS
Status: DISCONTINUED | OUTPATIENT
Start: 2024-08-13 | End: 2024-08-16 | Stop reason: HOSPADM

## 2024-08-13 RX ORDER — IBUPROFEN 800 MG/1
800 TABLET, FILM COATED ORAL EVERY 6 HOURS
Status: DISCONTINUED | OUTPATIENT
Start: 2024-08-14 | End: 2024-08-16 | Stop reason: HOSPADM

## 2024-08-13 RX ORDER — LIDOCAINE 40 MG/G
CREAM TOPICAL
Status: DISCONTINUED | OUTPATIENT
Start: 2024-08-13 | End: 2024-08-16 | Stop reason: HOSPADM

## 2024-08-13 RX ORDER — OXYTOCIN 10 [USP'U]/ML
10 INJECTION, SOLUTION INTRAMUSCULAR; INTRAVENOUS
Status: DISCONTINUED | OUTPATIENT
Start: 2024-08-13 | End: 2024-08-16 | Stop reason: HOSPADM

## 2024-08-13 RX ORDER — METOCLOPRAMIDE HYDROCHLORIDE 5 MG/ML
10 INJECTION INTRAMUSCULAR; INTRAVENOUS EVERY 6 HOURS PRN
Status: DISCONTINUED | OUTPATIENT
Start: 2024-08-13 | End: 2024-08-16 | Stop reason: HOSPADM

## 2024-08-13 RX ORDER — ONDANSETRON 4 MG/1
4 TABLET, ORALLY DISINTEGRATING ORAL EVERY 30 MIN PRN
Status: DISCONTINUED | OUTPATIENT
Start: 2024-08-13 | End: 2024-08-13 | Stop reason: HOSPADM

## 2024-08-13 RX ORDER — SIMETHICONE 80 MG
80 TABLET,CHEWABLE ORAL 4 TIMES DAILY PRN
Status: DISCONTINUED | OUTPATIENT
Start: 2024-08-13 | End: 2024-08-16 | Stop reason: HOSPADM

## 2024-08-13 RX ORDER — ACETAMINOPHEN 325 MG/1
975 TABLET ORAL EVERY 6 HOURS
Status: DISCONTINUED | OUTPATIENT
Start: 2024-08-13 | End: 2024-08-16 | Stop reason: HOSPADM

## 2024-08-13 RX ORDER — SODIUM CHLORIDE, SODIUM LACTATE, POTASSIUM CHLORIDE, CALCIUM CHLORIDE 600; 310; 30; 20 MG/100ML; MG/100ML; MG/100ML; MG/100ML
INJECTION, SOLUTION INTRAVENOUS CONTINUOUS
Status: DISCONTINUED | OUTPATIENT
Start: 2024-08-13 | End: 2024-08-13 | Stop reason: HOSPADM

## 2024-08-13 RX ORDER — METOCLOPRAMIDE 10 MG/1
10 TABLET ORAL EVERY 6 HOURS PRN
Status: DISCONTINUED | OUTPATIENT
Start: 2024-08-13 | End: 2024-08-16 | Stop reason: HOSPADM

## 2024-08-13 RX ORDER — SODIUM CHLORIDE, SODIUM LACTATE, POTASSIUM CHLORIDE, CALCIUM CHLORIDE 600; 310; 30; 20 MG/100ML; MG/100ML; MG/100ML; MG/100ML
INJECTION, SOLUTION INTRAVENOUS
Status: DISPENSED
Start: 2024-08-13 | End: 2024-08-14

## 2024-08-13 RX ORDER — SODIUM CHLORIDE, SODIUM LACTATE, POTASSIUM CHLORIDE, CALCIUM CHLORIDE 600; 310; 30; 20 MG/100ML; MG/100ML; MG/100ML; MG/100ML
INJECTION, SOLUTION INTRAVENOUS CONTINUOUS PRN
Status: DISCONTINUED | OUTPATIENT
Start: 2024-08-13 | End: 2024-08-13

## 2024-08-13 RX ORDER — AMOXICILLIN 250 MG
1 CAPSULE ORAL 2 TIMES DAILY
Status: DISCONTINUED | OUTPATIENT
Start: 2024-08-13 | End: 2024-08-16 | Stop reason: HOSPADM

## 2024-08-13 RX ORDER — NICOTINE POLACRILEX 4 MG
15-30 LOZENGE BUCCAL
Status: DISCONTINUED | OUTPATIENT
Start: 2024-08-13 | End: 2024-08-16 | Stop reason: HOSPADM

## 2024-08-13 RX ORDER — MODIFIED LANOLIN
OINTMENT (GRAM) TOPICAL
Status: DISCONTINUED | OUTPATIENT
Start: 2024-08-13 | End: 2024-08-16 | Stop reason: HOSPADM

## 2024-08-13 RX ORDER — OXYTOCIN/0.9 % SODIUM CHLORIDE 30/500 ML
340 PLASTIC BAG, INJECTION (ML) INTRAVENOUS CONTINUOUS PRN
Status: DISCONTINUED | OUTPATIENT
Start: 2024-08-13 | End: 2024-08-16 | Stop reason: HOSPADM

## 2024-08-13 RX ORDER — SODIUM CHLORIDE, SODIUM LACTATE, POTASSIUM CHLORIDE, CALCIUM CHLORIDE 600; 310; 30; 20 MG/100ML; MG/100ML; MG/100ML; MG/100ML
INJECTION, SOLUTION INTRAVENOUS
Status: COMPLETED
Start: 2024-08-13 | End: 2024-08-13

## 2024-08-13 RX ORDER — BISACODYL 10 MG
10 SUPPOSITORY, RECTAL RECTAL DAILY PRN
Status: DISCONTINUED | OUTPATIENT
Start: 2024-08-15 | End: 2024-08-16 | Stop reason: HOSPADM

## 2024-08-13 RX ORDER — BUPIVACAINE HYDROCHLORIDE 2.5 MG/ML
INJECTION, SOLUTION EPIDURAL; INFILTRATION; INTRACAUDAL
Status: DISCONTINUED | OUTPATIENT
Start: 2024-08-13 | End: 2024-08-13

## 2024-08-13 RX ORDER — HYDROMORPHONE HCL IN WATER/PF 6 MG/30 ML
0.4 PATIENT CONTROLLED ANALGESIA SYRINGE INTRAVENOUS EVERY 5 MIN PRN
Status: DISCONTINUED | OUTPATIENT
Start: 2024-08-13 | End: 2024-08-13 | Stop reason: HOSPADM

## 2024-08-13 RX ORDER — GLYCOPYRROLATE 0.2 MG/ML
INJECTION, SOLUTION INTRAMUSCULAR; INTRAVENOUS PRN
Status: DISCONTINUED | OUTPATIENT
Start: 2024-08-13 | End: 2024-08-13

## 2024-08-13 RX ORDER — DEXAMETHASONE SODIUM PHOSPHATE 4 MG/ML
4 INJECTION, SOLUTION INTRA-ARTICULAR; INTRALESIONAL; INTRAMUSCULAR; INTRAVENOUS; SOFT TISSUE
Status: DISCONTINUED | OUTPATIENT
Start: 2024-08-13 | End: 2024-08-13 | Stop reason: HOSPADM

## 2024-08-13 RX ORDER — ONDANSETRON 2 MG/ML
INJECTION INTRAMUSCULAR; INTRAVENOUS PRN
Status: DISCONTINUED | OUTPATIENT
Start: 2024-08-13 | End: 2024-08-13

## 2024-08-13 RX ORDER — LOPERAMIDE HCL 2 MG
2 CAPSULE ORAL
Status: DISCONTINUED | OUTPATIENT
Start: 2024-08-13 | End: 2024-08-16 | Stop reason: HOSPADM

## 2024-08-13 RX ORDER — CARBOPROST TROMETHAMINE 250 UG/ML
250 INJECTION, SOLUTION INTRAMUSCULAR
Status: DISCONTINUED | OUTPATIENT
Start: 2024-08-13 | End: 2024-08-16 | Stop reason: HOSPADM

## 2024-08-13 RX ORDER — PROCHLORPERAZINE MALEATE 10 MG
10 TABLET ORAL EVERY 6 HOURS PRN
Status: DISCONTINUED | OUTPATIENT
Start: 2024-08-13 | End: 2024-08-16 | Stop reason: HOSPADM

## 2024-08-13 RX ORDER — TRANEXAMIC ACID 10 MG/ML
1 INJECTION, SOLUTION INTRAVENOUS EVERY 30 MIN PRN
Status: DISCONTINUED | OUTPATIENT
Start: 2024-08-13 | End: 2024-08-16 | Stop reason: HOSPADM

## 2024-08-13 RX ORDER — NIFEDIPINE 30 MG/1
30 TABLET, EXTENDED RELEASE ORAL ONCE
Status: COMPLETED | OUTPATIENT
Start: 2024-08-13 | End: 2024-08-13

## 2024-08-13 RX ADMIN — SIMETHICONE 80 MG: 80 TABLET, CHEWABLE ORAL at 08:45

## 2024-08-13 RX ADMIN — SODIUM CHLORIDE, POTASSIUM CHLORIDE, SODIUM LACTATE AND CALCIUM CHLORIDE: 600; 310; 30; 20 INJECTION, SOLUTION INTRAVENOUS at 00:15

## 2024-08-13 RX ADMIN — NIFEDIPINE 30 MG: 30 TABLET, FILM COATED, EXTENDED RELEASE ORAL at 02:56

## 2024-08-13 RX ADMIN — NIFEDIPINE 30 MG: 30 TABLET, FILM COATED, EXTENDED RELEASE ORAL at 08:43

## 2024-08-13 RX ADMIN — SODIUM CHLORIDE, POTASSIUM CHLORIDE, SODIUM LACTATE AND CALCIUM CHLORIDE 75 ML/HR: 600; 310; 30; 20 INJECTION, SOLUTION INTRAVENOUS at 04:39

## 2024-08-13 RX ADMIN — ACETAMINOPHEN 975 MG: 325 TABLET, FILM COATED ORAL at 11:54

## 2024-08-13 RX ADMIN — ACETAMINOPHEN 975 MG: 325 TABLET, FILM COATED ORAL at 23:50

## 2024-08-13 RX ADMIN — ACETAMINOPHEN 975 MG: 325 TABLET, FILM COATED ORAL at 05:43

## 2024-08-13 RX ADMIN — METOCLOPRAMIDE HYDROCHLORIDE 10 MG: 5 INJECTION INTRAMUSCULAR; INTRAVENOUS at 02:17

## 2024-08-13 RX ADMIN — BUPIVACAINE 20 ML: 13.3 INJECTION, SUSPENSION, LIPOSOMAL INFILTRATION at 01:32

## 2024-08-13 RX ADMIN — DIPHENHYDRAMINE HYDROCHLORIDE 25 MG: 25 CAPSULE ORAL at 02:26

## 2024-08-13 RX ADMIN — MORPHINE SULFATE 0.15 MG: 1 INJECTION EPIDURAL; INTRATHECAL; INTRAVENOUS at 00:10

## 2024-08-13 RX ADMIN — PHENYLEPHRINE HYDROCHLORIDE 100 MCG: 10 INJECTION INTRAVENOUS at 00:25

## 2024-08-13 RX ADMIN — ONDANSETRON 4 MG: 2 INJECTION INTRAMUSCULAR; INTRAVENOUS at 00:24

## 2024-08-13 RX ADMIN — Medication 300 ML/HR: at 00:37

## 2024-08-13 RX ADMIN — BUPIVACAINE HYDROCHLORIDE 20 ML: 2.5 INJECTION, SOLUTION EPIDURAL; INFILTRATION; INTRACAUDAL at 01:32

## 2024-08-13 RX ADMIN — Medication 2 G: at 00:29

## 2024-08-13 RX ADMIN — KETOROLAC TROMETHAMINE 30 MG: 30 INJECTION, SOLUTION INTRAMUSCULAR at 10:41

## 2024-08-13 RX ADMIN — PHENYLEPHRINE HYDROCHLORIDE 50 MCG/MIN: 10 INJECTION INTRAVENOUS at 00:19

## 2024-08-13 RX ADMIN — KETOROLAC TROMETHAMINE 30 MG: 30 INJECTION, SOLUTION INTRAMUSCULAR at 04:39

## 2024-08-13 RX ADMIN — PHENYLEPHRINE HYDROCHLORIDE 200 MCG: 10 INJECTION INTRAVENOUS at 00:19

## 2024-08-13 RX ADMIN — NIFEDIPINE 30 MG: 30 TABLET, FILM COATED, EXTENDED RELEASE ORAL at 22:52

## 2024-08-13 RX ADMIN — IBUPROFEN 800 MG: 800 TABLET, FILM COATED ORAL at 23:50

## 2024-08-13 RX ADMIN — OXYCODONE HYDROCHLORIDE 5 MG: 5 TABLET ORAL at 21:02

## 2024-08-13 RX ADMIN — EPHEDRINE SULFATE 10 MG: 5 INJECTION INTRAVENOUS at 00:22

## 2024-08-13 RX ADMIN — GLYCOPYRROLATE 0.3 MG: 0.2 INJECTION, SOLUTION INTRAMUSCULAR; INTRAVENOUS at 00:22

## 2024-08-13 RX ADMIN — BUPIVACAINE HYDROCHLORIDE IN DEXTROSE 1.6 ML: 7.5 INJECTION, SOLUTION SUBARACHNOID at 00:10

## 2024-08-13 RX ADMIN — ACETAMINOPHEN 975 MG: 325 TABLET, FILM COATED ORAL at 17:53

## 2024-08-13 RX ADMIN — ONDANSETRON 4 MG: 2 INJECTION INTRAMUSCULAR; INTRAVENOUS at 22:06

## 2024-08-13 RX ADMIN — SENNOSIDES AND DOCUSATE SODIUM 2 TABLET: 50; 8.6 TABLET ORAL at 08:44

## 2024-08-13 RX ADMIN — LABETALOL HYDROCHLORIDE 20 MG: 5 INJECTION, SOLUTION INTRAVENOUS at 02:56

## 2024-08-13 RX ADMIN — PHENYLEPHRINE HYDROCHLORIDE 200 MCG: 10 INJECTION INTRAVENOUS at 00:22

## 2024-08-13 RX ADMIN — SIMETHICONE 80 MG: 80 TABLET, CHEWABLE ORAL at 21:05

## 2024-08-13 RX ADMIN — KETOROLAC TROMETHAMINE 30 MG: 30 INJECTION, SOLUTION INTRAMUSCULAR at 16:39

## 2024-08-13 RX ADMIN — MISOPROSTOL 800 MCG: 200 TABLET ORAL at 01:16

## 2024-08-13 RX ADMIN — MAGNESIUM SULFATE HEPTAHYDRATE 2 G/HR: 40 INJECTION, SOLUTION INTRAVENOUS at 17:49

## 2024-08-13 RX ADMIN — FENTANYL CITRATE 15 MCG: 50 INJECTION INTRAMUSCULAR; INTRAVENOUS at 00:10

## 2024-08-13 RX ADMIN — SODIUM CHLORIDE, SODIUM LACTATE, POTASSIUM CHLORIDE, CALCIUM CHLORIDE 75 ML/HR: 600; 310; 30; 20 INJECTION, SOLUTION INTRAVENOUS at 04:39

## 2024-08-13 RX ADMIN — ONDANSETRON 4 MG: 2 INJECTION INTRAMUSCULAR; INTRAVENOUS at 04:10

## 2024-08-13 RX ADMIN — SENNOSIDES AND DOCUSATE SODIUM 2 TABLET: 50; 8.6 TABLET ORAL at 21:01

## 2024-08-13 ASSESSMENT — ACTIVITIES OF DAILY LIVING (ADL)
ADLS_ACUITY_SCORE: 21
ADLS_ACUITY_SCORE: 20
ADLS_ACUITY_SCORE: 21
ADLS_ACUITY_SCORE: 20
ADLS_ACUITY_SCORE: 20
ADLS_ACUITY_SCORE: 21
ADLS_ACUITY_SCORE: 16
ADLS_ACUITY_SCORE: 21
ADLS_ACUITY_SCORE: 16
ADLS_ACUITY_SCORE: 20
ADLS_ACUITY_SCORE: 16
ADLS_ACUITY_SCORE: 21
ADLS_ACUITY_SCORE: 20
ADLS_ACUITY_SCORE: 21
ADLS_ACUITY_SCORE: 20
ADLS_ACUITY_SCORE: 16
ADLS_ACUITY_SCORE: 16
ADLS_ACUITY_SCORE: 21
ADLS_ACUITY_SCORE: 16
ADLS_ACUITY_SCORE: 16
ADLS_ACUITY_SCORE: 20

## 2024-08-13 NOTE — PROGRESS NOTES
"Post  Anesthesia Follow Up Note    Patient: Heike Musa    Patient location: Postpartum floor.    Chief complaint: Acute postoperative pain management s/p intrathecal analgesic administration     Procedure(s) Performed:  Procedure(s):   section    Anesthesia type: Spinal Block    Subjective:     Pain Control: Adequate    Additional ROS:  She does not complain of pruritus at this time. She denies weakness, denies paresthesia, denies difficulties breathing or voiding, denies nausea or vomiting. She has not attempted to ambulate yet. She has been tolerating regular diet.    Objective:    Respiratory Function (RR / SpO2 / Airway Patency): Satisfactory    Cardiac Function (HR / Rhythm / BP): Satisfactory    Strength and sensation lower extremities: Normal    Site of spinal/epidural insertion: No signs of infection or inflammation.     Last Vitals: /69 (BP Location: Right arm, Patient Position: Semi-Howard's, Cuff Size: Adult Regular)   Pulse 73   Temp 36.4  C (97.5  F) (Oral)   Resp 16   Ht 1.6 m (5' 3\")   Wt 82 kg (180 lb 12.4 oz)   LMP 2023   SpO2 100%   Breastfeeding Unknown   BMI 32.02 kg/m      Assessment and plan:   Heike Musa is a 34 year old female  POD #1 s/p   with IT bupivacaine, fentanyl and morphine; and single shot TAP block.  Pt is recovering well with no evidence of anesthesia related adverse side effects.     Christine Bender MD  Anesthesiology Resident, CA-1, PGY-2   "

## 2024-08-13 NOTE — PLAN OF CARE
Goal Outcome Evaluation:      Plan of Care Reviewed With: patient, spouse    Overall Patient Progress: improvingOverall Patient Progress: improving     VSS and postpartum assessments WDL except mild range pressures. Blood pressures have improved at the last checks. No s/s of pre-e. Reflexes normal. Attempted breastfeeding, but too tired to try for very long. Requested formula until she feels better. Pain managed with Tylenol and Toradol. Nausea improved after Zofran and Reglan. Spouse and family present and supportive.  Will continue with postpartum cares and education per plan of care.

## 2024-08-13 NOTE — PLAN OF CARE
Data: Patient presented to [facility]: 2024  5:21 PM.  Reason for maternal/fetal assessment is elevated blood pressures. Patient denies any pre-ec symptoms. Patient denies leaking of vaginal fluid/rupture of membranes, vaginal bleeding, nausea, vomiting, headache, visual disturbances, epigastric or RUQ pain. Patient reports fetal movement is normal. Patient is a 39w5d . Prenatal record reviewed. Pregnancy has been complicated by diabetes. Support person is present.     Fetal HR baseline was 130, variability is  . Accelerations:  . Decelerations:  . Uterine assessment is moderate by palpation during contractions and at rest. Membranes: intact.    Series of Elevated BP noted, informed Provider.   . Patient reports pain and is coping.     Action: Verbal consent for EFM. Triage assessment completed. Provider will come to   Assess the patient.     Response: Patient agreed with the plan.

## 2024-08-13 NOTE — PROVIDER NOTIFICATION
08/12/24 1820   Provider Notification   Provider Name/Title Dr. JALEEL Yen   Method of Notification At Bedside   Request Evaluate in Person   Notification Reason Patient Arrived;Labor Status;Maternal Vital Sign Change     Notified Provider about patient's status, MD at bedside to assess the patient. Bedside scan done, baby is at breech presentation. Series of non sustained elevated BP noted while MD is at bedside. Patient verbalized being uncomfortable with her contractions, increasing in intensity. MD explained the plan for C sections given her condition right now. Patient requested to discuss the plan with her  first before deciding.    Patient's  served as an  for the patient.

## 2024-08-13 NOTE — PROVIDER NOTIFICATION
08/12/24 1908   Provider Notification   Provider Name/Title Dr. JALEEL Yen   Method of Notification Electronic Page   Notification Reason SVE;Pain  (1cm, very posterior but soft cervix.)     MD notified about patient's status, patient is getting more uncomfortable. Writer checked the patient. Cervix is 1cm, with very posterior but soft cervix.

## 2024-08-13 NOTE — ANESTHESIA CARE TRANSFER NOTE
Patient: Heike Musa    Procedure: Procedure(s):   section       Diagnosis:  delivery delivered [O82]  Diagnosis Additional Information: No value filed.    Anesthesia Type:   No value filed.     Note:    Oropharynx: oropharynx clear of all foreign objects and spontaneously breathing  Level of Consciousness: awake  Oxygen Supplementation: face mask  Level of Supplemental Oxygen (L/min / FiO2): 6  Independent Airway: airway patency satisfactory and stable  Dentition: dentition unchanged  Vital Signs Stable: post-procedure vital signs reviewed and stable  Report to RN Given: handoff report given  Patient transferred to: PACU    Handoff Report: Identifed the Patient, Identified the Reponsible Provider, Reviewed the pertinent medical history, Discussed the surgical course, Reviewed Intra-OP anesthesia mangement and issues during anesthesia, Set expectations for post-procedure period and Allowed opportunity for questions and acknowledgement of understanding      Vitals:  Vitals Value Taken Time   /78 24 0148   Temp     Pulse 74 24 0152   Resp 23 24 0152   SpO2 98 % 24 0152   Vitals shown include unfiled device data.    Electronically Signed By: Nilesh Armendariz MD  2024  1:53 AM

## 2024-08-13 NOTE — PROVIDER NOTIFICATION
08/13/24 0207   Provider Notification   Provider Name/Title Farshad SUN   Method of Notification Electronic Page   Notification Reason Other     Confirming new orders of insulin, pt gdma1, during pregnancy.

## 2024-08-13 NOTE — PROGRESS NOTES
Summary:  VSS.  Assessments WNL.  Seen by Dr JALEEL Fraga this morning.  See prev notes sent to Dr Ramos.  Pt is comfortable with tylenol and toradal.  Mylicon given.  Pt has BS but not reporting flatus at this time.  Pre E on magnesium sulfate 2 gm/hr.  Tolerating well - reports slight dizziness since last evening - no change.  No reported HA, Vision changes, or epigastric pain.  +1 reflexes bilat.  Clear LS.  Bedrest w SCDs in place.  Schneider in place while mg infusing per order.  GDM - diet.  Eating food brought in from home.  [employer]  used throughout. Reviewed tx plan with pt.  Itching but declines meds for it.  NB at bedside.  Attempting breast feeding but NB sleepy at breast.  LC to see.  Will continue to monitor closely.  Call light is within reach for assistance.

## 2024-08-13 NOTE — ANESTHESIA PROCEDURE NOTES
"TAP Procedure Note    Pre-Procedure   Staff -        Anesthesiologist:  Lennox Rodriguez MD       Resident/Fellow: Nilesh Armendariz MD       Performed By: with residents       Procedure performed by resident/fellow/CRNA in presence of a teaching physician.         Location: OR       Procedure Start/Stop Times: 8/13/2024 1:32 AM and 8/13/2024 1:42 AM       Pre-Anesthestic Checklist: patient identified, IV checked, site marked, risks and benefits discussed, informed consent, monitors and equipment checked, pre-op evaluation, at physician/surgeon's request and post-op pain management  Timeout:       Correct Patient: Yes        Correct Procedure: Yes        Correct Site: Yes        Correct Position: Yes        Correct Laterality: Yes        Site Marked: Yes  Procedure Documentation  Procedure: TAP       Laterality: bilateral       Patient Position: supine       Skin prep: Chloraprep       Insertion Site: T9-10.       Needle Gauge: 21.        Needle Length (millimeters): 110        Ultrasound guided       1. Ultrasound was used to identify targeted nerve, plexus, vascular marker, or fascial plane and place a needle adjacent to it in real-time.       2. Ultrasound was used to visualize the spread of anesthetic in close proximity to the above referenced structure.       3. A permanent image is entered into the patient's record.       4. The visualized anatomic structures appeared normal.       5. There were no apparent abnormal pathologic findings.  Medication(s) Administered   Bupivacaine 0.25% PF (Infiltration) - Infiltration   20 mL - 8/13/2024 1:32:00 AM  Bupivacaine liposome (Exparel) 1.3% LA inj susp (Infiltration) - Infiltration   20 mL - 8/13/2024 1:32:00 AM  Medication Administration Time: 8/13/2024 1:32 AM      FOR [facility] ([facility] [place]) ONLY:   Pain Team Contact information: please page the Pain Team Via [employer]. Search \"Pain\". During daytime hours, please page the attending first. At night please page the "  first.

## 2024-08-13 NOTE — PLAN OF CARE
Data: Blood pressures outside parameters and increasing.   Vitals:    08/12/24 1811 08/12/24 1840 08/12/24 1900 08/12/24 1908   BP: (!) 156/77 (!) 168/85 (!) 154/69 (!) 150/73   BP Location: Left arm  Right arm Right arm   Patient Position: Semi-Howard's  Right side Right side   Cuff Size: Adult Regular      Resp:    15   Temp:    98  F (36.7  C)   TempSrc:    Oral   Weight:       Height:       .   Vitals:    08/12/24 1748   Weight: 82 kg (180 lb 12.4 oz)   .   No intake/output data recorded..   Signs and symptoms of pre-eclampsia Absent. Fetal assessment , active.  Pre-eclampsia labs Ordered, results pending  Interventions: Monitor blood pressures and indicators of pre-eclampsia every 15 mins as ordered.  Continue uterine/fetal assessment continuously. Activity level Advance activity as tolerated.  Plan: Plan for CS. Observe for and notify care provider of indicators of worsening pre-eclampsia or signs of fetal/maternal compromise.     Report given SHEREE Walker

## 2024-08-13 NOTE — PROGRESS NOTES
Summary:  Heike comfortable and stable this shift.  +1 to +2 reflexes.  Tolerating magnesium sulfate with some reported dizziness with movement.  No reported HA, vision changes, epigastric pain, SOB.  I/O maintained.  Comfortable with tylenol/motrin.  +Flatus.  Schneider/doe cares maintained.  Bashar and family here today- supportive at bedside.  Breast feeding with assistance.  Seen by LC.  Call light is within reach.

## 2024-08-13 NOTE — PROVIDER NOTIFICATION
08/13/24 0829   Provider Notification   Provider Name/Title Dr Ramos   Method of Notification Electronic Page   Notification Reason Other     Reported 0735 BP and asked if scheduled 0800 nifedipine should be given now or held.  Last had labetalol and nifedipine at 0300.  Response to hold if SBP > 110 or >60.

## 2024-08-13 NOTE — OP NOTE
[facility]  Full Operative Progress Note     Name: Heike Musa    MRN: [id number]    : 1990    Date of Surgery: 2024    Pre-operative Diagnosis:   -  at 39w5d  - Complete breech presentation   - Preeclampsia with severe features  - GDMA1     Post-operative Diagnosis:   - Now   - Liveborn female infant  - Unstable lie  - Otherwise same as above    Procedure(s): Primary low transverse  section with single layer uterine closure via Pfannenstiel skin incision      Surgeon:  Delores Wooten MD     Assistants:  Tiff Yen DO, PGY-2    Anna Marie Mays, MS3     Anesthesia: Spinal     QBL: 925 mL     Urine Output: 35 mL clear urine     Fluids: 1900 mL crystalloid    Medications: Ancef 2g, MN misoprostol 800 mg     Drains: Schneider catheter    Specimens: cord segment, cord blood     Complications: None apparent.      Indications:   Heike Musa is a 34 year old year old  at 39w6d who presented to triage for blood pressure evaluation with the CNM service ultimately found to meet criteria for preeclampsia with severe features. She was serially scanned via BSUS while in house and found to have her fetus in persistently breech presentation. Recommendation was made for primary  for breech presentation. The risks, benefits, and alternatives of  section were discussed with the patient, and she agreed to proceed.     Findings:   - A single vigorous, liveborn female infant in cephalic presentation, weighing 3560g with Apgars of 7 and 9.  - Normal appearing uterus, fallopian tubes, ovaries.    - No nuchal cord. Clear amniotic fluid.   - No intraabdominal or recto-fascial adhesions.    Procedure Details:   The patient was brought to the OR, where adequate spinal anesthesia was administered.  She was placed in the dorsal supine position with a slight leftward tilt. A Schneider catheter was placed in the bladder. She was prepped and draped in the usual sterile  fashion. A surgical time out was performed. A Pfannenstiel skin incision was made with the scalpel, and carried down to the underlying fascia with sharp and blunt dissection. The fascia was incised in the midline, and the incision was extended laterally with the Johansen scissors. The superior aspect of the fascia was grasped with the Kocher clamps and dissected off of the underlying rectus muscles with blunt and sharp dissection. The rectus muscles were  in the midline, and the peritoneum was entered bluntly, and the opening was extended with good visualization of the bladder.  A transverse hysterotomy was made with the scalpel in the lower uterine segment, and the incision was extended with digital pressure. The infant was noted to be in the ROT position, and was delivered atraumatically. The shoulders delivered easily.  No nuchal cord was noted. The cord was doubly clamped and cut, and the infant was handed off to the awaiting NICU staff. A segment of cord was cut and sent to lab. The placenta was delivered with gentle traction on the umbilical cord and uterine massage. The uterus was exteriorized and cleared of all clots and debris. Uterine tone was noted to be adequate with 20 units of pitocin given through the running IV and uterine massage.  The hysterotomy was closed with a running locked suture of 0 Vicryl. The hysterotomy was noted to be hemostatic. The posterior cul-de-sac was  cleared of all clots and debris. The uterus was returned to the abdomen. The pericolic gutters were  cleared of all clots and debris. The hysterotomy was reexamined and noted to be hemostatic. The fascia and rectus muscles were examined and areas of oozing were controlled with electrocautery. The fascia was closed with a running 0 Vicryl suture. The subcutaneous tissue was irrigated and areas of oozing were controlled with electrocautery. The subcutaneous tissue was closed with a running suture of 3-0 Vicryl. The skin was  closed with 4-0 Monocryl and covered with Dermabond.     All sponge, needle, and instrument counts were correct. The patient tolerated the procedure well, and was transferred to recovery in stable condition. Dr. Wooten was present and scrubbed for the entirety of the procedure.     Tiff Yen DO, PGY-2  [facility]   August 13, 2024 5:43 AM     I was scrubbed and present for the entire procedure.  I have reviewed and edited the above note.     Delores Wooten MD, FACOG

## 2024-08-13 NOTE — PROVIDER NOTIFICATION
08/13/24 0236   Provider Notification   Provider Name/Title Farshad SUN   Method of Notification Electronic Page   Notification Reason Vital Signs Change     Repeat BP in 15 min, plan to start XR nifedipine.

## 2024-08-13 NOTE — PROVIDER NOTIFICATION
08/13/24 1012   Provider Notification   Provider Name/Title Dr Ramos   Method of Notification Electronic Page     Requested order for villanueva to remain in place until mg d/c'd - if that is the order.  Updated that 0600 .

## 2024-08-13 NOTE — PLAN OF CARE
David KING was bedside to speak with patient and her  regarding recommended plan of CS.  in use for conversation. After discussion, pt wishes to speak to her family about decision, reoriented to call light.

## 2024-08-13 NOTE — ANESTHESIA POSTPROCEDURE EVALUATION
Patient: Heike Musa    Procedure: Procedure(s):   section       Anesthesia Type:  No value filed.    Note:  Disposition: Inpatient   Postop Pain Control: Uneventful            Sign Out: Well controlled pain   PONV: No   Neuro/Psych: Uneventful            Sign Out: Acceptable/Baseline neuro status   Airway/Respiratory: Uneventful            Sign Out: Acceptable/Baseline resp. status   CV/Hemodynamics: Uneventful            Sign Out: Acceptable CV status   Other NRE: NONE   DID A NON-ROUTINE EVENT OCCUR? No           Last vitals:  Vitals Value Taken Time   /96 24 0245   Temp 36.4  C (97.6  F) 24 0148   Pulse 57 24 0245   Resp 21 24 0245   SpO2 96 % 24 0245       Electronically Signed By: Lennox Rodriguez MD  2024  6:34 AM

## 2024-08-13 NOTE — PROVIDER NOTIFICATION
08/12/24 174   Provider Notification   Provider Name/Title A. Page CNM   Method of Notification Electronic Page   Request Evaluate in Person   Notification Reason Patient Arrived;Maternal Vital Sign Change  (/88 mm Hg, with Cat 1 tracing, narayan every 4-5 minutes, .)     Provider notified about patient's arrival. Initial VS at 148/88 mm Hg. Denies any pre-ec symptoms. Cat 1 tracing, narayan 4-5 minutes, FHT at 125. Patient also complained of labor pain.   Provider acknowledged, ordered to check reflexes and will order labs.

## 2024-08-13 NOTE — PLAN OF CARE
David KING was bedside to discuss plan of care with patient. Patient and her  have questions about CS which CNM answered at bedside with use of . Pt continues to have mild range Bps and is asymptomatic of pre e. Denies any HA, blurry vision, N/V, RUQ pain, leaking of fluid, vaginal bleeding. Feeling contractions, however reports not increasing in intensity. Plan is to speak again regarding plan of care when CNM returns. Pt agreeable to plan.

## 2024-08-13 NOTE — BRIEF OP NOTE
[facility]    Brief Operative Note    Pre-operative diagnosis: Preeclampsia with severe features, breech presentation   Post-operative diagnosis Preeclampsia with severe features, unstable lie     Procedure:  section, N/A - Abdomen    Surgeon: Surgeons and Role:     * Delores Wooten MD - Primary     * Tiff Yen DO - Resident - Assisting  Anesthesia: Spinal   Estimated Blood Loss: 925 mL   IVF: 1900 ml   UOP: 35 ml clear urine     Specimens:   ID Type Source Tests Collected by Time Destination   A :  Tissue Umbilical Cord SEE PROVIDERS ORDERS Delores Wooten MD 2024  1:10 AM    B :  Cord blood Umbilical Cord OR DOCUMENTATION ONLY Delores Wooten MD 2024  1:10 AM      Findings:    No rectofascial or intraabdominal adhesions  Clear amniotic fluid   Liveborn female in cephalic presentation, Apgars 7/9, weight pending  Small subcentimeter pedunculated fibroid at the fundus. Otherwise normal appearing uterus, tubes, ovaries.     Complications: None.  Implants: * No implants in log *    Full operative note to follow.     Tiff Yen DO, PGY-2  [facility]   2024 1:29 AM             """Call if vision decreases or RD warning signs increases/RD warnings given.  No signs of right eye ""

## 2024-08-13 NOTE — PLAN OF CARE
Patient transferred to PACU @ 0145  in stable condition. Initial assessment WNL. Vitals stable, mild range BP.. Fundus firm/1below./scant bleeding with few small clots. Firm without massage. Report received from Marcello SUN. Pain assessment complete and pt reports pain is absent, mild itching present. Anesthesia performed TAP block in OR following procedure.

## 2024-08-13 NOTE — LACTATION NOTE
This note was copied from a baby's chart.  Consult for:  parent request for breastfeeding support   Consult done via iPad Sb     Infant Name: Meagan    Infant's Primary Care Clinic: [facility]    Delivery Information:  Meagan was born at Gestational Age: 39w6d via   delivery on 2024 12:35 AM     Maternal Health History:  Maternal past medical history, problem list and prior to admission medications reviewed and notable for  Information for the patient's mother:  Heike Musa [[id number]]     Past Medical History:   Diagnosis Date    Migraines     ,   Information for the patient's mother:  Heike Musa [[id number]]     Patient Active Problem List   Diagnosis    Vertiginous migraine    Immunization counseling    Need for Tdap vaccination    Encounter for supervision of normal first pregnancy in second trimester    Diet controlled gestational diabetes mellitus (GDM) in second trimester    Labor and delivery, indication for care    Flatulence, eructation and gas pain     (normal spontaneous vaginal delivery)    Breech presentation, no version    Elevated blood pressure complicating pregnancy in third trimester, antepartum    Encounter for triage in pregnant patient    Severe pre-eclampsia in third trimester    Breech presentation, single or unspecified fetus    , and   Information for the patient's mother:  Heike Musa [[id number]]     Medications Prior to Admission   Medication Sig Dispense Refill Last Dose    acetaminophen (TYLENOL) 325 MG tablet Take 1-2 tablets (325-650 mg) by mouth every 6 hours as needed for mild pain or pain 100 tablet 0 2024    benzocaine-menthol (DERMOPLAST) 20-0.5 % AERO Apply 1 g topically every 8 hours as needed (perineal) 1 g 0 Unknown    bisacodyl (DULCOLAX) 10 MG suppository Place 1 suppository (10 mg) rectally daily as needed for constipation 10 suppository 0 Unknown    dibucaine 1 % OINT rectal ointment Place rectally 4 times daily as needed for  hemorrhoids To numb area for pain relief 56 g 2 Unknown    diclofenac (VOLTAREN) 1 % topical gel Apply 2 g topically 4 times daily 100 g 4 Unknown    docusate sodium (COLACE) 100 MG capsule Take 1 capsule (100 mg) by mouth 2 times daily as needed for constipation 60 capsule 2 Unknown    doxylamine (UNISOM) 25 MG TABS tablet Take 1 tablet (25 mg) by mouth at bedtime 30 tablet 0 Unknown    hydrocortisone (CORTAID) 1 % external cream Apply topically 2 times daily 30 g 2 Unknown    ibuprofen (ADVIL/MOTRIN) 800 MG tablet Take 1 tablet (800 mg) by mouth every 6 hours as needed for other (cramping) 20 tablet 0 Unknown    metoclopramide (REGLAN) 5 MG tablet Take 1 tablet (5 mg) by mouth 2 times daily as needed (vomiting) 20 tablet 0 Unknown    phenylephrine-shark liver oil-mineral oil-petrolatum (PREPARATION H) 0.25-14-74.9 % rectal ointment Place rectally 2 times daily as needed for hemorrhoids 28 g 1 Unknown    Prenatal Vit-Fe Fumarate-FA (PRENATAL MULTIVITAMIN W/IRON) 27-0.8 MG tablet Take 1 tablet by mouth daily 30 tablet 0 8/12/2024    psyllium (METAMUCIL SMOOTH TEXTURE) 28 % packet Use 1 packet daily until having regular soft stools. 30 each 0 Unknown    senna-docusate (SENOKOT-S/PERICOLACE) 8.6-50 MG tablet Take 1 tablet by mouth   Unknown    witch hazel-glycerin (TUCKS) pad Apply topically as needed for hemorrhoids 100 each 5 Unknown          Maternal Breast Exam:  Heike noted breast growth and sensitivity in early pregnancy. She denies any history of breast/chest injury or surgery. Her breasts are soft and symmetrical with bilateral intact, everted nipples. She has been able to hand express colostrum. ?    Breastfeeding/ Lactation History: Heike shares that she tried to breastfeed her first child for 3 months but had introduced formula and bottles and had difficulty with latching. She states that her baby would fall asleep at the breast and because she was not latching frequently her milk supply eventually dried  up.    Infant information: Meagan was AGA at birth and has age appropriate output. 13 hours old at time of consult.    Weight Change Since Birth: N/A, <24 hours old.    Oral exam of baby:  Meagan has normal jaw, normal arched palate, good length of tongue beyond lingual frenulum attachment, extension well beyond gum ridge & organized when sucking on finger.     Feeding History: Heike has been attempting to latch intermittently but has mostly been giving formula due to exhaustion from surgery, magnesium infusion, and from being up all night. Heike states that Meagan will latch and suck then come off and she has to repeatedly re-latch her. About 30 minutes prior to arrival to room Meagan had a successful feeding where she sustained the latch and had a coordinated suck.    Feeding Assessment:  Meagan had already finished feeding on one side but was showing strong hunger cues so assisted with bringing her to breast on the other side. She was eager to latch but only latching onto the nipple and had a chompy suck. Guided Heike to ensure her fingers were parallel to baby's lips when sandwiching the breast. Eventually she was able to get a comfortable latch but Meagan was not able to sustain for long even with continuing to hold the breast sandwich. Several attempts were made and then attempted in laid back and cradle position as well. Eventually she fell asleep and Heike requested to sleep due to exhaustion. Encouraged to continue to call bedside nurse and/or lactation for continued support.    Education:   [x] Expected  feeding patterns in the first few days (pg. 38 of Your Guide to To Postpartum and  Care)/ the Second Night  [x] Stages of milk production  [x] Benefits of hand expression of colostrum  [x] Early feeding cues     [x] Benefits of feeding on cue  [x] Benefits of skin to skin  [x] Breastfeeding positions  [x] Tips to get and maintain a deep latch  [] Nutritive vs.non-nutritive  sucking  [] Gentle breast compressions as needed to enhance milk transfer  [] How to tell when baby is finished  [x] How to tell if baby is getting enough  [x] Expected  output  [x]  weight loss  [x] Infant Feeding Log  [] [facility] [employer] [facility] Breastfeeding/Pumping videos  [x] Signs breastfeeding is going well (comfortable latch, audible swallows, age appropriate output and weight loss)    [] Tips to prevent engorgement  [] Signs of engorgement  [] Tips to manage engorgement  [x] Pumping recommendations (based on patient need)  [] [facility] breast pump part/infant feeding supplies cleaning recommendations  [x] Inpatient breastfeeding support  [x] Outpatient lactation resources    Handouts: Infant Feeding Log (Week 1, Your Guide to Postpartum &  Care Book) and [facility] Lactation Resources    Home Breast Pump: needs pump at discharge; reviewed options and provided [facility] breast pump option handout.    Plan: Continue breastfeeding on cue with RN support as needed, goal of 8-12 feedings per day.     Encourage frequent skin to skin and hand expression.     Encouraged follow up with outpatient lactation consultant  as needed after discharge. Reviewed outpatient lactation resources through [facility] and [facility].      Nathaly Saucedo RN, IBCLC   Lactation Consultant  [place]: [facility] [phone]  Office: [phone]

## 2024-08-13 NOTE — PROGRESS NOTES
Patient arrived to [facility] unit via zoom cart at 0345 ,with belongings, accompanied by spouse/ significant other, with infant in arms. Received report from  SHEREE Walker  and checked bands and Mag check. Unit and room orientation started. Call light given; no concerns present at this time. Continue with plan of care.

## 2024-08-13 NOTE — ANESTHESIA PROCEDURE NOTES
"Intrathecal injection Procedure Note    Pre-Procedure   Staff -        Anesthesiologist:  Lennox Rodriguez MD       Resident/Fellow: Nilesh Armendariz MD       Performed By: resident       Location: OR       Procedure Start/Stop Times: 8/13/2024 12:10 AM and 8/13/2024 12:15 AM       Pre-Anesthestic Checklist: patient identified, IV checked, risks and benefits discussed, informed consent, monitors and equipment checked, pre-op evaluation, at physician/surgeon's request and post-op pain management  Timeout:       Correct Patient: Yes        Correct Procedure: Yes        Correct Site: Yes        Correct Position: Yes   Procedure Documentation  Procedure: intrathecal injection       Patient Position: sitting       Skin prep: Chloraprep       Insertion Site: L4-5. (midline approach).       Needle Gauge: 25.        Needle Length (Inches): 3.5        Spinal Needle Type: Pencan       Introducer used       # of attempts: 1 and  # of redirects:  0    Assessment/Narrative         Paresthesias: No.       CSF fluid: clear.       Opening pressure was cmH2O while  Sitting.      Medication(s) Administered   0.75% Hyperbaric Bupivacaine (Intrathecal) - Intrathecal   1.6 mL - 8/13/2024 12:10:00 AM  Morphine PF 1 mg/mL (Intrathecal) - Intrathecal   0.15 mg - 8/13/2024 12:10:00 AM  Fentanyl PF (Intrathecal) - Intrathecal   15 mcg - 8/13/2024 12:10:00 AM  Medication Administration Time: 8/13/2024 12:10 AM      FOR [facility] ([facility]) ONLY:   Pain Team Contact information: please page the Pain Team Via linkedÃ¼. Search \"Pain\". During daytime hours, please page the attending first. At night please page the resident first.      "

## 2024-08-13 NOTE — PROVIDER NOTIFICATION
08/13/24 1000   Provider Notification   Provider Name/Title Dr Ramos   Method of Notification Electronic Page   Notification Reason Status Update     Sent FYI update that 0932 /78.  Reports dizziness but no change since last jena per pt

## 2024-08-13 NOTE — H&P
"OB/GYN Consult Note    Asked by Anayeli Abreu CNM to evaluate patient for presentation and delivery recommendations.    In short, Heike is a 33 yo  at 39w5d here for blood pressure evaluation after having elevated blood pressures in clinic today. Her pregnancy is otherwise complicated by GDMA1 and breech presentation.     Since arrival, she has met criteria for preeclampsia with severe features and required a dose of IV labetalol 20 mg for SSR pressures. HELLP labs notable for proteinuria with UPC 0.38 and platelets of 137, otherwise wnl. She is currently asymptomatic.     Heike is here with her partner. They declined the use of a [email or web address] . She states that she has been narayan painfully today and that they are increasing in frequency, approximately every 5-10 minutes per her report. No LOF or vaginal bleeding. Regular fetal movement.     Last NPO 1300 this afternoon.     O:   /67   Temp 98.1  F (36.7  C) (Oral)   Resp 14   Ht 1.6 m (5' 3\")   Wt 82 kg (180 lb 12.4 oz)   LMP 2023   SpO2 100%   BMI 32.02 kg/m      Gen: laying in bed, breathing through contractions  CV: Well perfused  Pulm: Breathing comfortably on RA     BSUS: complete breech with fetal head in epigastric region     SVE per RN 1 cm, posterior, soft     FHT: baseline 125, moderate variability, accels present, no decels  [place]: 1-2 ctx in 10 minutes     A/P:   Reviewed constellation of findings with patient and her partner. We discussed the new diagnosis of preeclampsia with severe features. She is persistently breech on BSUS today. I informed the patient that at this gestational age would recommend moving towards delivery and given breech presentation would recommend PLTCS. We did briefly discuss trial of ECV under spinal anesthesia; however, given term GA, new dx preeclampsia, and likely labor would not recommend this at this juncture.     Discussed risks of C/s including risk of bleeding including remote risk of " hysterectomy, risk of infection, risk of injury to surrounding structures including bladder, bowel, ureters, nerves, blood vessels. Consents to blood transfusion if indicated. Consents to primary  section.     - CBC, T&S  - Transfer to Ob/gyn   - Initiate magnesium 4g > 2g/hr for seizure prophylaxis   - Ancef 2g   - to OR for PLTCS    Tiff Yen DO, PGY-2  [facility]   2024 8:17 PM    The patient was reviewed with David Forte CNM and Dr. Yen.  I agree with the above assessment and plan of care.     Delores Wooten MD, FACOG

## 2024-08-13 NOTE — ANESTHESIA PREPROCEDURE EVALUATION
Anesthesia Pre-Procedure Evaluation    Patient: Heike Musa   MRN: [id number] : 1990        Procedure : Procedure(s):   section          Past Medical History:   Diagnosis Date    Migraines       No past surgical history on file.   No Known Allergies   Social History     Tobacco Use    Smoking status: Never    Smokeless tobacco: Never   Substance Use Topics    Alcohol use: No      Wt Readings from Last 1 Encounters:   24 82 kg (180 lb 12.4 oz)        Anesthesia Evaluation            ROS/MED HX  ENT/Pulmonary:       Neurologic:       Cardiovascular:     (+)  hypertension- -   -  - -                                      METS/Exercise Tolerance:     Hematologic:       Musculoskeletal:       GI/Hepatic:       Renal/Genitourinary:       Endo:       Psychiatric/Substance Use:       Infectious Disease:       Malignancy:       Other: Comment: Pre-E with severe Features            Physical Exam    Airway        Mallampati: II   TM distance: > 3 FB   Neck ROM: full   Mouth opening: > 3 cm    Respiratory Devices and Support         Dental           Cardiovascular             Pulmonary                   OUTSIDE LABS:  CBC:   Lab Results   Component Value Date    WBC 8.5 2024    WBC 11.3 (H) 2024    HGB 12.5 2024    HGB 13.0 2024    HCT 35.5 2024    HCT 38.4 2024     (L) 2024     2024     BMP:   Lab Results   Component Value Date     2024     (L) 2024    POTASSIUM 4.1 2024    POTASSIUM 3.8 2024    CHLORIDE 104 2024    CHLORIDE 101 2024    CO2 18 (L) 2024    CO2 25 2024    BUN 8.6 2024    BUN 4.7 (L) 2024    CR 0.52 2024    CR 0.39 (L) 2024    GLC 72 2024    GLC 70 2024     COAGS:   Lab Results   Component Value Date    INR 1.04 10/22/2023     POC:   Lab Results   Component Value Date     (H) 2020    HCG Negative 2021    HCGS Negative  10/22/2023     HEPATIC:   Lab Results   Component Value Date    ALBUMIN 3.4 (L) 08/12/2024    PROTTOTAL 6.5 08/12/2024    ALT 6 08/12/2024    AST 16 08/12/2024    ALKPHOS 208 (H) 08/12/2024    BILITOTAL 0.6 08/12/2024     OTHER:   Lab Results   Component Value Date    LACT 0.7 10/22/2023    YESSI 9.3 08/12/2024    LIPASE 42 03/14/2024    TSH 0.58 08/31/2022    SED 15 10/22/2023       Anesthesia Plan    ASA Status:  3       Anesthesia Type: Spinal.              Consents            Postoperative Care            Comments:               Nilesh Armendariz MD    I have reviewed the pertinent notes and labs in the chart from the past 30 days and (re)examined the patient.  Any updates or changes from those notes are reflected in this note.          # Hypoalbuminemia: Lowest albumin = 3.4 g/dL at 8/12/2024  6:31 PM, will monitor as appropriate

## 2024-08-13 NOTE — PROGRESS NOTES
[facility]  Magnesium Check Note    S:   Patient is feeling itchy diffusely - worse on her back and legs. She also has some positional dizziness. Denies headache, vision changes/spots in vision, chest pain, shortness of breath, RUQ or epigastric pain.    O:  Patient Vitals for the past 4 hrs:   BP Temp Temp src Pulse Resp SpO2   24 1235 (!) 141/79 -- -- 77 16 99 %   24 1135 (!) 141/86 97.5  F (36.4  C) Oral 71 16 99 %   24 1034 136/73 -- -- 74 16 99 %   24 0932 (!) 141/78 -- -- 77 16 100 %     Gen: Resting comfortably in bed. NAD.  CV:  RRR, no murmurs  Pulm:  CTAB, no wheezes or crackles  Abd:  Soft, non-tender. Fundus firm.  Ext:  Patellar reflexes 1+ b/l and biceps reflexes 2+ b/l, Trace LE edema b/l, SCD's in place.    I/O:  I/O last 3 completed shifts:  In: 300 [P.O.:300]  Out: 1537 [Urine:600; Blood:937]    A/P:  Heike Musa is a 34 year old  on PPD#0 s/p PLTCS, with pregnancy complicated by preeclampsia with severe features.    Preeclampsia with severe features  - BP: Mild range blood pressures this AM into afternoon, D#1 of nifedipine 60 mg (received 2x doses of 30 mg this AM), will plan to increase PRN if continued elevated ranges.  - UOP: adequate, strict Is/Os.   - Symptoms: Dizziness, itchiness. Otherwise normal.  - HELLP labs nl, with platelets stable 128K this AM   - Mag sulfate for seizure prophylaxis: continue 2g/hr  - Next clinical mag check at 1730    Pruritus  - Trialing topical emollients per patient request  - Benadryl 25 mg q6h PRN    Postpartum:  routine postpartum care    Mary Carmen Marcus MD PGY-1  24 1:28 PM

## 2024-08-13 NOTE — DISCHARGE SUMMARY
[facility] Discharge Summary    Heike Musa MRN# [id number]   Age: 34 year old YOB: 1990     Date of Admission:  2024  Date of Discharge:  2024  Admitting Physician:  Delores Wooten MD  Discharge Physician:  Yamila Melissa MD             Admission Diagnoses:    at 39w6d  Breech presentation  Preeclampsia with severe features  GDMA1          Discharge Diagnosis:     Same, now , delivered   Unstable lie           Procedures:     Procedure(s): Primary low transverse  section with single layer closure via Pfannenstiel skin incision  TAP block                  Medications Prior to Admission:     Medications Prior to Admission   Medication Sig Dispense Refill Last Dose    acetaminophen (TYLENOL) 325 MG tablet Take 1-2 tablets (325-650 mg) by mouth every 6 hours as needed for mild pain or pain 100 tablet 0 2024    benzocaine-menthol (DERMOPLAST) 20-0.5 % AERO Apply 1 g topically every 8 hours as needed (perineal) 1 g 0 Unknown    bisacodyl (DULCOLAX) 10 MG suppository Place 1 suppository (10 mg) rectally daily as needed for constipation 10 suppository 0 Unknown    dibucaine 1 % OINT rectal ointment Place rectally 4 times daily as needed for hemorrhoids To numb area for pain relief 56 g 2 Unknown    diclofenac (VOLTAREN) 1 % topical gel Apply 2 g topically 4 times daily 100 g 4 Unknown    docusate sodium (COLACE) 100 MG capsule Take 1 capsule (100 mg) by mouth 2 times daily as needed for constipation 60 capsule 2 Unknown    doxylamine (UNISOM) 25 MG TABS tablet Take 1 tablet (25 mg) by mouth at bedtime 30 tablet 0 Unknown    hydrocortisone (CORTAID) 1 % external cream Apply topically 2 times daily 30 g 2 Unknown    ibuprofen (ADVIL/MOTRIN) 800 MG tablet Take 1 tablet (800 mg) by mouth every 6 hours as needed for other (cramping) 20 tablet 0 Unknown    metoclopramide (REGLAN) 5 MG tablet Take 1 tablet (5 mg) by mouth 2 times daily as needed (vomiting) 20  tablet 0 Unknown    phenylephrine-shark liver oil-mineral oil-petrolatum (PREPARATION H) 0.25-14-74.9 % rectal ointment Place rectally 2 times daily as needed for hemorrhoids 28 g 1 Unknown    Prenatal Vit-Fe Fumarate-FA (PRENATAL MULTIVITAMIN W/IRON) 27-0.8 MG tablet Take 1 tablet by mouth daily 30 tablet 0 2024    psyllium (METAMUCIL SMOOTH TEXTURE) 28 % packet Use 1 packet daily until having regular soft stools. 30 each 0 Unknown    senna-docusate (SENOKOT-S/PERICOLACE) 8.6-50 MG tablet Take 1 tablet by mouth   Unknown    witch hazel-glycerin (TUCKS) pad Apply topically as needed for hemorrhoids 100 each 5 Unknown             Discharge Medications:        Review of your medicines        START taking        Dose / Directions   cyclobenzaprine 10 MG tablet  Commonly known as: FLEXERIL  Used for: S/P  section      Dose: 10 mg  Take 1 tablet (10 mg) by mouth every 8 hours as needed for muscle spasms or other (pain)  Quantity: 20 tablet  Refills: 0     * NIFEdipine ER 30 MG 24 hr tablet  Commonly known as: ADALAT CC  Used for: S/P  section      Dose: 30 mg  Take 1 tablet (30 mg) by mouth every 24 hours In the evening with dinner  Quantity: 30 tablet  Refills: 0     * NIFEdipine ER 60 MG 24 hr tablet  Commonly known as: ADALAT CC  Used for: S/P  section      Dose: 60 mg  Take 1 tablet (60 mg) by mouth every 24 hours In the morning with breakfast  Quantity: 30 tablet  Refills: 0     oxyCODONE 5 MG tablet  Commonly known as: ROXICODONE  Used for: S/P  section      Dose: 5 mg  Take 1 tablet (5 mg) by mouth every 6 hours as needed for severe pain  Quantity: 12 tablet  Refills: 0           * This list has 2 medication(s) that are the same as other medications prescribed for you. Read the directions carefully, and ask your doctor or other care provider to review them with you.                CONTINUE these medicines which may have CHANGED, or have new prescriptions. If we are uncertain of  the size of tablets/capsules you have at home, strength may be listed as something that might have changed.        Dose / Directions   acetaminophen 325 MG tablet  Commonly known as: TYLENOL  This may have changed:   how much to take  reasons to take this  Used for: S/P  section      Dose: 975 mg  Take 3 tablets (975 mg) by mouth every 6 hours as needed for mild pain  Quantity: 100 tablet  Refills: 0     * ibuprofen 800 MG tablet  Commonly known as: ADVIL/MOTRIN  This may have changed: Another medication with the same name was added. Make sure you understand how and when to take each.  Used for:  (normal spontaneous vaginal delivery)      Dose: 800 mg  Take 1 tablet (800 mg) by mouth every 6 hours as needed for other (cramping)  Quantity: 20 tablet  Refills: 0     * ibuprofen 800 MG tablet  Commonly known as: ADVIL/MOTRIN  This may have changed: You were already taking a medication with the same name, and this prescription was added. Make sure you understand how and when to take each.  Used for: S/P  section      Dose: 800 mg  Take 1 tablet (800 mg) by mouth every 6 hours as needed for moderate pain  Quantity: 60 tablet  Refills: 0     senna-docusate 8.6-50 MG tablet  Commonly known as: SENOKOT-S/PERICOLACE  This may have changed: when to take this  Used for: S/P  section      Dose: 1 tablet  Take 1 tablet by mouth 2 times daily for 30 days  Quantity: 60 tablet  Refills: 0           * This list has 2 medication(s) that are the same as other medications prescribed for you. Read the directions carefully, and ask your doctor or other care provider to review them with you.                CONTINUE these medicines which have NOT CHANGED        Dose / Directions   benzocaine-menthol 20-0.5 % Aero  Commonly known as: DERMOPLAST  Used for: Perineal laceration during delivery, postpartum condition      Dose: 1 g  Apply 1 g topically every 8 hours as needed (perineal)  Quantity: 1 g  Refills: 0      bisacodyl 10 MG suppository  Commonly known as: DULCOLAX      Dose: 10 mg  Place 1 suppository (10 mg) rectally daily as needed for constipation  Quantity: 10 suppository  Refills: 0     dibucaine 1 % Oint rectal ointment  Used for: External hemorrhoids      Place rectally 4 times daily as needed for hemorrhoids To numb area for pain relief  Quantity: 56 g  Refills: 2     diclofenac 1 % topical gel  Commonly known as: VOLTAREN  Used for: Foot pain, bilateral, Plantar fasciitis, Pain in both feet      Dose: 2 g  Apply 2 g topically 4 times daily  Quantity: 100 g  Refills: 4     docusate sodium 100 MG capsule  Commonly known as: COLACE  Used for: External hemorrhoids      Dose: 100 mg  Take 1 capsule (100 mg) by mouth 2 times daily as needed for constipation  Quantity: 60 capsule  Refills: 2     doxylamine 25 MG Tabs tablet  Commonly known as: UNISOM      Dose: 25 mg  Take 1 tablet (25 mg) by mouth at bedtime  Quantity: 30 tablet  Refills: 0     hydrocortisone 1 % external cream  Commonly known as: CORTAID  Used for: External hemorrhoids      Apply topically 2 times daily  Quantity: 30 g  Refills: 2     prenatal multivitamin w/iron 27-0.8 MG tablet  Used for: Encounter for supervision of normal first pregnancy in second trimester      Dose: 1 tablet  Take 1 tablet by mouth daily  Quantity: 30 tablet  Refills: 0     Preparation H 0.25-14-74.9 % rectal ointment  Used for: External hemorrhoids  Generic drug: phenylephrine-mineral oil-petrolatum      Place rectally 2 times daily as needed for hemorrhoids  Quantity: 28 g  Refills: 1     psyllium 28 % packet  Commonly known as: METAMUCIL SMOOTH TEXTURE      Use 1 packet daily until having regular soft stools.  Quantity: 30 each  Refills: 0     witch hazel-glycerin pad  Commonly known as: TUCKS  Used for: External hemorrhoids      Apply topically as needed for hemorrhoids  Quantity: 100 each  Refills: 5            STOP taking      metoclopramide 5 MG tablet  Commonly known  as: REGLAN                  Where to get your medicines        These medications were sent to [facility] [place], [place] - [address]  [address] [facility] [address], [facility] [address]      Phone: [phone]   acetaminophen 325 MG tablet  cyclobenzaprine 10 MG tablet  ibuprofen 800 MG tablet  NIFEdipine ER 30 MG 24 hr tablet  NIFEdipine ER 60 MG 24 hr tablet  oxyCODONE 5 MG tablet  senna-docusate 8.6-50 MG tablet               Consultations:   OB/GYN   Anesthesia            Brief Admission History:   Ms. Heike Musa is a 34 year old now P2 who initially presented at 39w6d for hypertensive evaluation with the [facility] service. She was ultimately found to meet criteria for preeclampsia with severe features and given persistent breech presentation over serial evaluations after arrival, delivery via  section was recommended. The risks of the surgery were reviewed with the patient and she agreed to proceed.        Intraoperative course   The procedure was uncomplicated.   ml.  See operative report for details.     - A single vigorous, liveborn female infant in cephalic presentation, weighing 3560g with Apgars of 7 and 9.  - Normal appearing uterus, fallopian tubes, ovaries.    - No nuchal cord. Clear amniotic fluid.   - No intraabdominal or recto-fascial adhesions.        Postpartum Course   The patient's hospital course was unremarkable.  She received 24 hours of magnesium for seizure prophylaxis. Her blood pressures were controlled on nifedipine 60 mg qAM, 30 mg qPM. She recovered as anticipated and experienced no post-operative complications. On discharge, her pain was well controlled. Vaginal bleeding is similar to peak menstrual flow.  Voiding without difficulty.  Ambulating well and tolerating a normal diet.  No fever or significant wound drainage.  Breastfeeding  and bottle feeding well.  Infant is stable.   She was discharged on postpartum day #3.    Post-partum hemoglobin:    Hemoglobin   Date Value Ref Range Status   08/13/2024 11.2 (L) 11.7 - 15.7 g/dL Final   06/09/2020 9.6 (L) 11.7 - 15.7 g/dL Final             Discharge Instructions and Follow-Up:     Discharge diet: Regular   Discharge activity: No lifting greater than 20 lbs, pushing, pulling, or other strenuous activity for 6 weeks. Pelvic rest for 6 weeks including no sexual intercourse, tampons, or douching. No driving until you can slam on the brakes without pain or while on narcotic pain medications.    Discharge follow-up: Follow up with primary OB for routine postpartum visit in 6 weeks  Follow up for blood pressure check within 5 days   Wound care: Keep incision clean and dry           Discharge Disposition:     Discharged to home in stable condition      Joaquina Gilbert MD  OB/GYN PGY-3  08/16/2024 10:50 AM

## 2024-08-13 NOTE — PROGRESS NOTES
[facility]  Magnesium Check Note    S:   Patient is feeling well.  Denies headache, vision changes/spots in vision, chest pain, shortness of breath, RUQ or epigastric pain.    O:  Patient Vitals for the past 4 hrs:   BP Temp Temp src Pulse Resp SpO2   24 0836 136/76 -- -- 73 16 100 %   24 0735 111/65 98.1  F (36.7  C) Oral 71 16 98 %   24 0630 125/71 -- -- 65 16 96 %   24 0600 125/66 -- -- 63 16 97 %     Gen: Laying in bed, resting  CV:  RRR, no murmurs  Pulm:  CTAB, no wheezes or crackles  Abd:  Soft, non-tender  Ext:  Patellar 1+ bilaterally, trace+ LE edema b/l    I/O:  I/O last 3 completed shifts:  In: 300 [P.O.:300]  Out: 1537 [Urine:600; Blood:937]    A/P:  Heike Musa is a 34 year old  on POD#0 s/p PLTCS, with pregnancy complicated by preeclampsia with severe features.    Preeclampsia with severe features  - BP: most recently normotensive, on D#1 of nifedipine 30 mg, will uptitrate prn   - UOP: adequate, strict Is/Os.   - Symptoms: Denies  - HELLP labs pending for the AM   - Mag sulfate for seizure prophylaxis: 2g/hr  - Next clinical mag check at 1030    Postpartum:  routine postpartum care    Tiff Yen DO, PGY-2  [facility]   2024 8:24 AM     Appreciate note by Dr. Yen. Patient has been seen and examined by me separate from the resident, agree with above note. Doing well. Some dizziness on magnesium. Pain controlled. Schneider still in place. Reviewed BP goals. Discontinue magnesium tonight. Labs stable today. Continue to titrate BP meds as needed.     Tiff Fraga MD  9:17 AM

## 2024-08-14 ENCOUNTER — OFFICE VISIT (OUTPATIENT)
Dept: INTERPRETER SERVICES | Facility: CLINIC | Age: 34
End: 2024-08-14
Payer: COMMERCIAL

## 2024-08-14 LAB
ERYTHROCYTE [DISTWIDTH] IN BLOOD BY AUTOMATED COUNT: 13.2 % (ref 10–15)
HCT VFR BLD AUTO: 33.3 % (ref 35–47)
HGB BLD-MCNC: 11.5 G/DL (ref 11.7–15.7)
MCH RBC QN AUTO: 30.9 PG (ref 26.5–33)
MCHC RBC AUTO-ENTMCNC: 34.5 G/DL (ref 31.5–36.5)
MCV RBC AUTO: 90 FL (ref 78–100)
PLATELET # BLD AUTO: 142 10E3/UL (ref 150–450)
RBC # BLD AUTO: 3.72 10E6/UL (ref 3.8–5.2)
WBC # BLD AUTO: 8.9 10E3/UL (ref 4–11)

## 2024-08-14 PROCEDURE — 250N000013 HC RX MED GY IP 250 OP 250 PS 637: Performed by: OBSTETRICS & GYNECOLOGY

## 2024-08-14 PROCEDURE — 120N000002 HC R&B MED SURG/OB UMMC

## 2024-08-14 PROCEDURE — 36415 COLL VENOUS BLD VENIPUNCTURE: CPT

## 2024-08-14 PROCEDURE — T1013 SIGN LANG/ORAL INTERPRETER: HCPCS | Mod: U3

## 2024-08-14 PROCEDURE — 99232 SBSQ HOSP IP/OBS MODERATE 35: CPT | Mod: GC | Performed by: OBSTETRICS & GYNECOLOGY

## 2024-08-14 PROCEDURE — 85014 HEMATOCRIT: CPT

## 2024-08-14 PROCEDURE — 250N000013 HC RX MED GY IP 250 OP 250 PS 637

## 2024-08-14 RX ORDER — NALOXONE HYDROCHLORIDE 0.4 MG/ML
0.4 INJECTION, SOLUTION INTRAMUSCULAR; INTRAVENOUS; SUBCUTANEOUS
Status: DISCONTINUED | OUTPATIENT
Start: 2024-08-14 | End: 2024-08-16 | Stop reason: HOSPADM

## 2024-08-14 RX ORDER — POLYETHYLENE GLYCOL 3350 17 G/17G
17 POWDER, FOR SOLUTION ORAL DAILY PRN
Status: DISCONTINUED | OUTPATIENT
Start: 2024-08-14 | End: 2024-08-16 | Stop reason: HOSPADM

## 2024-08-14 RX ORDER — NALOXONE HYDROCHLORIDE 0.4 MG/ML
0.2 INJECTION, SOLUTION INTRAMUSCULAR; INTRAVENOUS; SUBCUTANEOUS
Status: DISCONTINUED | OUTPATIENT
Start: 2024-08-14 | End: 2024-08-16 | Stop reason: HOSPADM

## 2024-08-14 RX ORDER — LIDOCAINE 4 G/G
2 PATCH TOPICAL
Status: DISCONTINUED | OUTPATIENT
Start: 2024-08-14 | End: 2024-08-16 | Stop reason: HOSPADM

## 2024-08-14 RX ORDER — CYCLOBENZAPRINE HCL 5 MG
5 TABLET ORAL 3 TIMES DAILY
Status: DISCONTINUED | OUTPATIENT
Start: 2024-08-14 | End: 2024-08-14

## 2024-08-14 RX ORDER — HYDROXYZINE HYDROCHLORIDE 50 MG/1
50 TABLET, FILM COATED ORAL EVERY 6 HOURS PRN
Status: DISCONTINUED | OUTPATIENT
Start: 2024-08-14 | End: 2024-08-16 | Stop reason: HOSPADM

## 2024-08-14 RX ORDER — HYDROXYZINE HYDROCHLORIDE 25 MG/1
25 TABLET, FILM COATED ORAL EVERY 6 HOURS PRN
Status: DISCONTINUED | OUTPATIENT
Start: 2024-08-14 | End: 2024-08-16 | Stop reason: HOSPADM

## 2024-08-14 RX ORDER — CYCLOBENZAPRINE HCL 5 MG
10 TABLET ORAL EVERY 8 HOURS PRN
Status: DISCONTINUED | OUTPATIENT
Start: 2024-08-14 | End: 2024-08-16 | Stop reason: HOSPADM

## 2024-08-14 RX ORDER — OXYCODONE HYDROCHLORIDE 5 MG/1
5-10 TABLET ORAL EVERY 4 HOURS PRN
Status: DISCONTINUED | OUTPATIENT
Start: 2024-08-14 | End: 2024-08-16 | Stop reason: HOSPADM

## 2024-08-14 RX ADMIN — ACETAMINOPHEN 975 MG: 325 TABLET, FILM COATED ORAL at 05:42

## 2024-08-14 RX ADMIN — SIMETHICONE 80 MG: 80 TABLET, CHEWABLE ORAL at 12:07

## 2024-08-14 RX ADMIN — OXYCODONE HYDROCHLORIDE 5 MG: 5 TABLET ORAL at 01:27

## 2024-08-14 RX ADMIN — ACETAMINOPHEN 975 MG: 325 TABLET, FILM COATED ORAL at 11:48

## 2024-08-14 RX ADMIN — LIDOCAINE 2 PATCH: 4 PATCH TOPICAL at 01:54

## 2024-08-14 RX ADMIN — IBUPROFEN 800 MG: 800 TABLET, FILM COATED ORAL at 18:02

## 2024-08-14 RX ADMIN — OXYCODONE HYDROCHLORIDE 5 MG: 5 TABLET ORAL at 05:42

## 2024-08-14 RX ADMIN — IBUPROFEN 800 MG: 800 TABLET, FILM COATED ORAL at 05:42

## 2024-08-14 RX ADMIN — OXYCODONE HYDROCHLORIDE 5 MG: 5 TABLET ORAL at 22:55

## 2024-08-14 RX ADMIN — SENNOSIDES AND DOCUSATE SODIUM 2 TABLET: 50; 8.6 TABLET ORAL at 08:04

## 2024-08-14 RX ADMIN — CYCLOBENZAPRINE HYDROCHLORIDE 5 MG: 5 TABLET, FILM COATED ORAL at 08:39

## 2024-08-14 RX ADMIN — HYDROXYZINE HYDROCHLORIDE 25 MG: 25 TABLET ORAL at 12:02

## 2024-08-14 RX ADMIN — ACETAMINOPHEN 975 MG: 325 TABLET, FILM COATED ORAL at 18:02

## 2024-08-14 RX ADMIN — SIMETHICONE 80 MG: 80 TABLET, CHEWABLE ORAL at 05:50

## 2024-08-14 RX ADMIN — OXYCODONE HYDROCHLORIDE 5 MG: 5 TABLET ORAL at 17:20

## 2024-08-14 RX ADMIN — IBUPROFEN 800 MG: 800 TABLET, FILM COATED ORAL at 11:48

## 2024-08-14 RX ADMIN — POLYETHYLENE GLYCOL 3350 17 G: 17 POWDER, FOR SOLUTION ORAL at 18:11

## 2024-08-14 RX ADMIN — NIFEDIPINE 60 MG: 30 TABLET, FILM COATED, EXTENDED RELEASE ORAL at 08:04

## 2024-08-14 ASSESSMENT — ACTIVITIES OF DAILY LIVING (ADL)
ADLS_ACUITY_SCORE: 21
ADLS_ACUITY_SCORE: 20
ADLS_ACUITY_SCORE: 20
ADLS_ACUITY_SCORE: 21
ADLS_ACUITY_SCORE: 20
ADLS_ACUITY_SCORE: 21
ADLS_ACUITY_SCORE: 21
ADLS_ACUITY_SCORE: 20
ADLS_ACUITY_SCORE: 21

## 2024-08-14 NOTE — PROVIDER NOTIFICATION
08/14/24 0809   Provider Notification   Provider Name/Title Dr Ramos   Method of Notification Electronic Page     Pt reporting abdominal pain and was told could have muscle relaxant.  Order requested.  Updated on abdominal mass/movable above U.  MD will assess.

## 2024-08-14 NOTE — PROGRESS NOTES
Summary:  VSS.  Assessments WNL.  Post Mg.  Denies s/s of Pre E.  No reported dizziness.  Encouraged to ambulate.  Sister/ present at bedside.  Measuring hat put in toliet with instructions on maintaining I/O - pt declining.  Voiding without difficulty.  Ambulated in room/bathroom.  Has soft mass/moveable just above umbilical.  Reported to Dr Ramos this morning.  C/o of LLQ muscle pain when walking.  Dr Ramos notified and flexeril given when available.  Reviewed pain meds.  Lidocaine patch in place.  Pt encouraged to ambulate.  +flatus. Formula feeding overnight.  Pt considering breast feeding/pumping today.  LC notified and will see - in meantime will assist/support feeding choices.  Call light is within reach for assistance. EDS/BC need to be completed. Mozamb[place]  used throughout.  Family brings in food.  Reviewed pain med options, no heat over lidocaine patches, encouraged ambulation, heat packs avail.  Pt states she will let me know what she needs.

## 2024-08-14 NOTE — PROGRESS NOTES
Assisted with pumping.  Mom offered assist with breast feeding - declines at this time.  At noon, given pain med options - per request given tyl/motrin; sleep med; mylicon.  Abd softly distended. +Flatus.  Encouraged to ambulate.  States want to sleep.  C/s incision remain D/I w liquid bandage/seal.  Olvin present.  Heike and Olvin do not want to use  at this time - speaking/understanding per pt.  Ipad at bedside for use.  Pt using abd binder prn.  No s/s of Pre E.  No dizziness.  Call light is within reach for assistance.

## 2024-08-14 NOTE — PROGRESS NOTES
[facility]  Magnesium Check Note    S:   Patient is feeling well.  Denies headache, vision changes/spots in vision, chest pain, shortness of breath, RUQ or epigastric pain. Having some cramping pain that is well controlled with medications.     O:  Patient Vitals for the past 4 hrs:   BP Temp Temp src Pulse Resp SpO2   24 1834 126/73 -- -- 63 16 100 %   24 1734 127/73 -- -- 62 16 100 %   24 1628 135/80 98.1  F (36.7  C) Oral 69 16 100 %   24 1527 133/76 -- -- 70 16 100 %     Gen: Laying in bed, NAD   CV:  RRR, no murmurs  Pulm:  CTAB, no wheezes or crackles  Abd:  Soft, non-tender  Ext:  Brachioradialis reflexes 2+ b/l, trace LE edema b/l    I/O:  I/O last 3 completed shifts:  In: 2838.6 [P.O.:1500; I.V.:1338.6]  Out: 2347 [Urine:1410; Blood:937]    A/P:  Heike Musa is a 34 year old  on POD#0 for PLTCS, with pregnancy complicated by preeclampsia with severe features.    Preeclampsia with severe features  - BP: normotensive on nifedipine 60 mg   - UOP: adequate, strict Is/Os.   - Symptoms: Denies  - HELLP labs wnl outside of platelets of 137 > 128  - Mag sulfate for seizure prophylaxis: 2g/hr  - Next clinical mag check at 2300    Postpartum:  routine postpartum care    Tiff Yen DO, PGY-2  [facility]   2024 7:22 PM

## 2024-08-14 NOTE — PROGRESS NOTES
Summary:  Heike denied any s/s of PreE.  VSS.  Assessments WNL.  Abdomin - soft but distended.  FFU/!.  +flatus.  C/o gas and incisional pain.  Encouraged ambulation.  Reviewed prn and scheduled meds.  Pt found comfort with lidocaine patches, flexeril, motrin/tylenol and oxycodone.  Slept after atarax at noon.  Taking senna; mylicon and miralax for GI relief.  +flatus.  Eating foods from home.  Participating in I/O.  Breast pumped x 1 today.  Declined breast feeding this shift - assist offered.  , sister and son present this afternoon.  EDS/BC and videos need to be completed.  Call light is within reach for assistance.

## 2024-08-14 NOTE — PROGRESS NOTES
Post Partum Progress Note  POD#1    Subjective:  She is resting comfortably in bed this morning. She complains of ongoing abdominal distension and gas pain. She also is complaining of left sided incisional pain with some radiation into her thigh and feels like this is affecting her ability to walk. She is tolerating PO intake. Lochia present and minimal.  Had the Schneider removed earlier this morning; has not yet voided. Passing minimal flatus. She is ambulating without dizziness or difficulty.  She denies headache, changes in vision, nausea/vomiting, chest pain, shortness of breath, RUQ pain, or worsening edema. Has not slept since Saturday per sister and would like to discuss options for sleep. Bottlefeeding going well.    Objective:  Vitals:    24 0035 24 0431 24 0747 24 0832   BP: (!) 155/94 (!) 140/81 121/74    BP Location: Left arm Right arm Right arm    Patient Position: Semi-Howard's Semi-Howard's Semi-Howard's    Cuff Size: Adult Regular Adult Regular Adult Regular    Pulse: 73 77 79    Resp: 16 16 16    Temp: 97.8  F (36.6  C) 97.8  F (36.6  C) 98  F (36.7  C)    TempSrc: Oral Oral Oral    SpO2: 100% 100%     Weight:    80.6 kg (177 lb 9.6 oz)   Height:           General: NAD. A&Ox3.  CV: Regular rate, well perfused.   Pulm: Normal respiratory effort.  Abd: Soft, moderately distended, diffusely tender. Unable to palpate fundus due to patient's generalized discomfort.   Incision: Incision is clean, dry, intact  Ext: 1+ lower extremity edema bilaterally. No calf tenderness.    Assessment/Plan:  Heike Musa is a 34 year old  female who is POD#1 s/p PLTCS in the setting of preeclampsia with severe features.    # Routine postpartum cares   - Encourage routine post-operative goals including ambulation and incentive spirometry  - PNC: Rh positive. Rubella immune. No intervention indicated.  - Pain: Intermittently controlled on oral medications. Will add Flexeril for additional pain  relief.   - Heme: Hgb 12.5> (miso) >11.2   - GI: continue anti-emetics and stool softeners as needed.   - : s/p Schneider, will follow up void   - Infant: Stable in room  - Feeding: Plans on bottle feeding   - BC: Not discussed     # Preeclampsia with severe features  - s/p 24 hr magnesium gtt   - s/p nifedipine 90 mg (received 30/30/30 over the course of 24 hours); D#1 nifedipine 60 in AM   - Blood pressures mild range overnight, will closely monitor Bps today and uptitrate as indicated  - Asymptomatic, no concerning s/sx   - HELLP labs wnl outside of mild thrombocytopenia, repeat CBC this AM pending     # GDMA1  - fBG 133  - Plan for 6 wk PP GTT     Dispo: Medically Ready for Discharge: Anticipated in 2-4 Days    Tiff Yen DO, PGY-2  [facility]   8/14/2024  8:37 AM

## 2024-08-14 NOTE — PLAN OF CARE
Vital signs and postpartum assessments within normal limits. Magnesium Sulfate infusion d/c'd at 0030. Blood pressures have been slightly elevated. Pt denies headache, vision changes, SOB, RUQ pain. Reflexes 2+ average, no clonus. Patient can walk to the bathroom with steady gait, voiding without difficulty. Incision is open to air, no drainage and healing well. Pt is Bottle feeding with assistance, and is attentive to infant cues. Reports good pain management with PO medications. Educated patient about infant safety, feeding schedule and non-pharm alternatives. Using heating pads and ice packs.  Support person present. Plan of care ongoing, no concerns as of present.     Goal Outcome Evaluation:  Problem: Postpartum ( Delivery)  Goal: Optimal Pain Control and Function  Outcome: Progressing  Intervention: Prevent or Manage Pain  Recent Flowsheet Documentation  Taken 2024 0227 by Delores Yao, RN  Pain Management Interventions:   medication (see MAR)   heat applied  Taken 2024 2102 by Delores Yao, RN  Pain Management Interventions: medication (see MAR)     Problem: Hypertensive Disorders in Pregnancy  Goal: Patient-Fetal Stabilization  Outcome: Progressing  Intervention: Optimize Blood Pressure and Fluid Status  Recent Flowsheet Documentation  Taken 2024 0431 by Delores Yao, RN  Fluid/Electrolyte Management: fluids provided

## 2024-08-14 NOTE — PROGRESS NOTES
[facility]  Magnesium Check Note    S:   Patient is feeling okay, having lots of gas pain.  Denies headache, vision changes/spots in vision, chest pain, shortness of breath, RUQ or epigastric pain.    O:  Patient Vitals for the past 4 hrs:   BP Pulse Resp SpO2   24 2359 (!) 154/92 68 -- 100 %   24 2348 (!) 159/92 70 -- 100 %   24 2231 (!) 149/92 80 -- 100 %   24 2145 (!) 146/90 72 -- 100 %   24 2130 (!) 145/91 72 14 100 %   24 2030 136/81 -- 16 99 %     Gen: Laying comfortably in bed, NAD   CV:  RRR, no murmurs  Pulm:  CTAB, no wheezes or crackles  Abd:  Soft, distended, diffuse tenderness   Ext:  Brachioradialis reflexes 1+ b/l    I/O:  I/O last 3 completed shifts:  In: 4564.6 [P.O.:2700; I.V.:1864.6]  Out: 4897 [Urine:3960; Blood:937]    A/P:  Heike Musa is a 34 year old  on POD#0 s/p PLTCS, with pregnancy complicated by preeclampsia with severe features.    Preeclampsia with severe features  - BP: most recently mild range, added nifed 30 overnight, will receive nifed 60 mg in AM, will titrate as indicated   - UOP: adequate, strict Is/Os.   - Symptoms: Denies  - HELLP wnl outside of mild TCP, recheck CBC in Am   - Mag sulfate for seizure prophylaxis: 2g/hr  - Mag off at 0030 following 24hr mag gtt for seizure prophylaxis      Postpartum:  routine postpartum care     Abdominal distension: moderate distention on exam, passing minimal flatus, encouraged ambulation, discussed use of Dulcolax suppository     Tiff Yen DO, PGY-2  [facility]   2024 12:06 AM

## 2024-08-14 NOTE — PROVIDER NOTIFICATION
08/13/24 1586   Provider Notification   Provider Name/Title G2 Tiff Yen   Method of Notification Phone   Request Evaluate-Remote   Notification Reason Status Update     Patient has had two elevated blood pressures 145/91, 146/90, no other s/s she is resting well.     Provider responded: added 30 mg nifed now

## 2024-08-15 PROCEDURE — 250N000013 HC RX MED GY IP 250 OP 250 PS 637

## 2024-08-15 PROCEDURE — 120N000002 HC R&B MED SURG/OB UMMC

## 2024-08-15 PROCEDURE — 250N000013 HC RX MED GY IP 250 OP 250 PS 637: Performed by: OBSTETRICS & GYNECOLOGY

## 2024-08-15 PROCEDURE — 99232 SBSQ HOSP IP/OBS MODERATE 35: CPT | Mod: GC | Performed by: OBSTETRICS & GYNECOLOGY

## 2024-08-15 RX ORDER — NIFEDIPINE 30 MG/1
30 TABLET, EXTENDED RELEASE ORAL EVERY 24 HOURS
Status: DISCONTINUED | OUTPATIENT
Start: 2024-08-15 | End: 2024-08-16 | Stop reason: HOSPADM

## 2024-08-15 RX ADMIN — IBUPROFEN 800 MG: 800 TABLET, FILM COATED ORAL at 00:24

## 2024-08-15 RX ADMIN — OXYCODONE HYDROCHLORIDE 10 MG: 5 TABLET ORAL at 18:51

## 2024-08-15 RX ADMIN — ACETAMINOPHEN 975 MG: 325 TABLET, FILM COATED ORAL at 18:26

## 2024-08-15 RX ADMIN — CYCLOBENZAPRINE HYDROCHLORIDE 10 MG: 5 TABLET, FILM COATED ORAL at 17:10

## 2024-08-15 RX ADMIN — NIFEDIPINE 30 MG: 30 TABLET, FILM COATED, EXTENDED RELEASE ORAL at 18:26

## 2024-08-15 RX ADMIN — NIFEDIPINE 60 MG: 30 TABLET, FILM COATED, EXTENDED RELEASE ORAL at 08:13

## 2024-08-15 RX ADMIN — IBUPROFEN 800 MG: 800 TABLET, FILM COATED ORAL at 18:26

## 2024-08-15 RX ADMIN — ACETAMINOPHEN 975 MG: 325 TABLET, FILM COATED ORAL at 12:18

## 2024-08-15 RX ADMIN — DIPHENHYDRAMINE HYDROCHLORIDE 25 MG: 25 CAPSULE ORAL at 18:51

## 2024-08-15 RX ADMIN — METOCLOPRAMIDE 10 MG: 10 TABLET ORAL at 18:51

## 2024-08-15 RX ADMIN — ACETAMINOPHEN 975 MG: 325 TABLET, FILM COATED ORAL at 00:24

## 2024-08-15 RX ADMIN — LIDOCAINE 2 PATCH: 4 PATCH TOPICAL at 02:53

## 2024-08-15 RX ADMIN — SIMETHICONE 80 MG: 80 TABLET, CHEWABLE ORAL at 08:13

## 2024-08-15 RX ADMIN — IBUPROFEN 800 MG: 800 TABLET, FILM COATED ORAL at 06:29

## 2024-08-15 RX ADMIN — ACETAMINOPHEN 975 MG: 325 TABLET, FILM COATED ORAL at 06:29

## 2024-08-15 RX ADMIN — SIMETHICONE 80 MG: 80 TABLET, CHEWABLE ORAL at 18:26

## 2024-08-15 RX ADMIN — IBUPROFEN 800 MG: 800 TABLET, FILM COATED ORAL at 12:18

## 2024-08-15 RX ADMIN — OXYCODONE HYDROCHLORIDE 5 MG: 5 TABLET ORAL at 08:13

## 2024-08-15 ASSESSMENT — ACTIVITIES OF DAILY LIVING (ADL)
ADLS_ACUITY_SCORE: 20
ADLS_ACUITY_SCORE: 21
ADLS_ACUITY_SCORE: 20
ADLS_ACUITY_SCORE: 21
ADLS_ACUITY_SCORE: 21
ADLS_ACUITY_SCORE: 20
ADLS_ACUITY_SCORE: 21
ADLS_ACUITY_SCORE: 20
ADLS_ACUITY_SCORE: 20
ADLS_ACUITY_SCORE: 21
ADLS_ACUITY_SCORE: 20

## 2024-08-15 NOTE — PLAN OF CARE
Goal Outcome Evaluation:      Plan of Care Reviewed With: patient, spouse    Data: Vital signs within normal limits. Postpartum checks within normal limits - see flow record. Patient eating and drinking normally. Patient able to empty bladder independently and is up ambulating. No apparent signs of infection. {EPIS/INCISION:149297} healing well. Patient performing self cares and is able to care for infant.  Action: Patient medicated during the shift for {PAIN AND CRAMPIN}. See MAR. Patient reassessed within 1 hour after each medication and pain was improved - patient stated she was comfortable. Patient education done about ***. See flow record.  Response: Positive attachment behaviors observed with infant. Support persons *** present.   Plan: Anticipate discharge on ***.

## 2024-08-15 NOTE — PROGRESS NOTES
"Post Partum Progress Note  POD#2    Subjective:  Doing okay this morning. Abdominal pain from distension has improved after ambulation yesterday. Passing more flatus now and had a bowel movement. Still having a lot of generalized abdominal pain. Did not feel like the Flexeril was helpful for this. The cramping is what is bothering her most this morning. She is wondering \"is this normal?\". Otherwise feeling okay. Formula feeding going well. No HA, vision changes, SOB, chest pain, RUQ pain. Bleeding is minimal.     Objective:  Vitals:    24 1656 24 2103 08/15/24 0028 08/15/24 0622   BP: 122/75 121/72 (!) 142/91 (!) 140/87   BP Location: Right arm Left arm Right arm Right arm   Patient Position: Semi-Howard's Semi-Howard's Semi-Howard's Semi-Howard's   Cuff Size: Adult Regular Adult Regular Adult Regular Adult Regular   Pulse: 101 79 67 83   Resp: 16 16 14 14   Temp:   98.3  F (36.8  C) 97.8  F (36.6  C)   TempSrc: Oral  Oral Oral   SpO2:       Weight:       Height:           General: NAD. A&Ox3.  CV: Regular rate, well perfused.   Pulm: Normal respiratory effort.  Abd: Soft, moderately distended, diffusely tender without focal tenderness. Fundus is firm and at the umbilicus.    Incision: incision is clean, dry, intact    Assessment/Plan:  Heike Musa is a 34 year old  female who is POD#2 s/p PLTCS. Doing well, continuing to work on pain control and blood pressure management.    # Routine postpartum cares   - Encourage routine post-operative goals including ambulation and incentive spirometry  - PNC: Rh positive. Rubella immune. No intervention indicated.  - Pain: Intermittently controlled on oral medications. Discussed taking oxycodone 5-10 mg prn as she has only been taking 5 mg. Has been intermittently using adjuncts including Flexeril and lidocaine patches. Low concern for intraabdominal pathology. Continued to encourage ambulation.   - Heme: Hgb 12.5> (miso) >11.2. Appropriate Hgb for " "ABLA.   - GI: continue anti-emetics and stool softeners as needed.   - : s/p Schneider, voiding spontaneously  - Infant: Stable in room  - Feeding: Plans on bottle feeding   - BC: Not discussed      # Preeclampsia with severe features  - s/p 24 hr magnesium gtt   - D#2 nifedipine 60 mg.   - Two mild ranges overnight prior to nifedipine dosing this AM. Will continue to monitor and uptitrate as indicated.   - Asymptomatic, no concerning s/sx   - HELLP labs wnl outside of mild thrombocytopenia. Plt 137>128 > 142. Stable, mild.      # GDMA1  - fBG 133  - Plan for 6 wk PP GTT     Dispo: Medically Ready for Discharge: Anticipated Tomorrow      Tiff Yen DO, PGY-2  [facility]   August 15, 2024 8:26 AM     /87 (BP Location: Right arm, Patient Position: Semi-Howard's, Cuff Size: Adult Regular)   Pulse 70   Temp 98  F (36.7  C) (Oral)   Resp 15   Ht 1.6 m (5' 3\")   Wt 80.6 kg (177 lb 9.6 oz)   LMP 11/20/2023   SpO2 100%   Breastfeeding Unknown   BMI 31.46 kg/m      Hemoglobin   Date Value Ref Range Status   08/14/2024 11.5 (L) 11.7 - 15.7 g/dL Final         The patient was seen and examined by me separately from the team.  I have reviewed and agree with the above note.  She is overall doing well, continues to have crampy abdominal pain. On exam, her abdominal wall is lax but soft, diffusely tympanic.  Encouraged ambulation, staying on schedule with pain medications today.  Reviewed likely discharge tomorrow.      Delores Wooten MD, FACOG    "

## 2024-08-15 NOTE — PLAN OF CARE
Postpartum assessments WDL. VSS. Blood pressures have been within normal limits, Pt denies headache, vision changes, SOB, RUQ pain. Reflexes 2+ average, no clonus.  Pt is Bottle feeding with assistance. Reports good pain management with PO medications. Support person present. Plan of care ongoing, no concerns as of present.      Goal Outcome Evaluation:  Problem: Hypertensive Disorders in Pregnancy  Goal: Patient-Fetal Stabilization  Outcome: Progressing     Problem: Postpartum ( Delivery)  Goal: Optimal Pain Control and Function  Outcome: Progressing  Intervention: Prevent or Manage Pain  Recent Flowsheet Documentation  Taken 2024 by Delores Yao, RN  Pain Management Interventions: medication (see MAR)  Taken 2024 191 by Delores Yao, RN  Pain Management Interventions: medication (see MAR)

## 2024-08-15 NOTE — PLAN OF CARE
Goal Outcome Evaluation:      Plan of Care Reviewed With: patient, spouse    Overall Patient Progress: improvingOverall Patient Progress: improving    Data: Vital signs within normal limits. Postpartum checks within normal limits - see flow record. Patient eating and drinking normally. Patient able to empty bladder independently and is up ambulating. No apparent signs of infection. Incision healing well. Patient performing self cares and is able to care for infant.  Action: Patient medicated during the shift for pain and cramping. See MAR. Patient reassessed within 1 hour after each medication and pain was improved - patient stated she was comfortable. Patient education done about self care. See flow record.  Response: Positive attachment behaviors observed with infant. Support persons was present.   Plan: Anticipate discharge.    Patient has been having intermittent moderate pressures. Patient BP meds were increased. Patient c/o of headache and adboinal pain. Patient given oxycodone. Patietn had a BM and felt some relief. Patient reminded to use hat to collet urine.

## 2024-08-16 VITALS
HEIGHT: 63 IN | WEIGHT: 177.6 LBS | BODY MASS INDEX: 31.47 KG/M2 | OXYGEN SATURATION: 100 % | HEART RATE: 91 BPM | SYSTOLIC BLOOD PRESSURE: 127 MMHG | DIASTOLIC BLOOD PRESSURE: 85 MMHG | TEMPERATURE: 98 F | RESPIRATION RATE: 16 BRPM

## 2024-08-16 PROBLEM — O14.10 PREECLAMPSIA, SEVERE: Status: ACTIVE | Noted: 2024-08-16

## 2024-08-16 PROCEDURE — 250N000013 HC RX MED GY IP 250 OP 250 PS 637

## 2024-08-16 PROCEDURE — 250N000013 HC RX MED GY IP 250 OP 250 PS 637: Performed by: OBSTETRICS & GYNECOLOGY

## 2024-08-16 PROCEDURE — 99238 HOSP IP/OBS DSCHRG MGMT 30/<: CPT | Mod: GC | Performed by: OBSTETRICS & GYNECOLOGY

## 2024-08-16 RX ORDER — OXYCODONE HYDROCHLORIDE 5 MG/1
5 TABLET ORAL EVERY 6 HOURS PRN
Qty: 12 TABLET | Refills: 0 | Status: SHIPPED | OUTPATIENT
Start: 2024-08-16 | End: 2024-08-19

## 2024-08-16 RX ORDER — CYCLOBENZAPRINE HCL 10 MG
10 TABLET ORAL EVERY 8 HOURS PRN
Qty: 20 TABLET | Refills: 0 | Status: SHIPPED | OUTPATIENT
Start: 2024-08-16 | End: 2024-08-26

## 2024-08-16 RX ORDER — AMOXICILLIN 250 MG
1 CAPSULE ORAL 2 TIMES DAILY
Qty: 60 TABLET | Refills: 0 | Status: SHIPPED | OUTPATIENT
Start: 2024-08-16 | End: 2024-09-15

## 2024-08-16 RX ORDER — NIFEDIPINE 30 MG
30 TABLET, EXTENDED RELEASE ORAL EVERY 24 HOURS
Qty: 30 TABLET | Refills: 0 | Status: SHIPPED | OUTPATIENT
Start: 2024-08-16

## 2024-08-16 RX ORDER — IBUPROFEN 800 MG/1
800 TABLET, FILM COATED ORAL EVERY 6 HOURS PRN
Qty: 60 TABLET | Refills: 0 | Status: SHIPPED | OUTPATIENT
Start: 2024-08-16

## 2024-08-16 RX ORDER — ACETAMINOPHEN 325 MG/1
975 TABLET ORAL EVERY 6 HOURS PRN
Qty: 100 TABLET | Refills: 0 | Status: SHIPPED | OUTPATIENT
Start: 2024-08-16

## 2024-08-16 RX ORDER — CALCIUM CARBONATE 750 MG/1
750 TABLET, CHEWABLE ORAL 2 TIMES DAILY PRN
Status: DISCONTINUED | OUTPATIENT
Start: 2024-08-16 | End: 2024-08-16 | Stop reason: HOSPADM

## 2024-08-16 RX ADMIN — CALCIUM CARBONATE 750 MG: 750 TABLET, CHEWABLE ORAL at 09:22

## 2024-08-16 RX ADMIN — IBUPROFEN 800 MG: 800 TABLET, FILM COATED ORAL at 11:53

## 2024-08-16 RX ADMIN — NIFEDIPINE 60 MG: 30 TABLET, FILM COATED, EXTENDED RELEASE ORAL at 09:22

## 2024-08-16 RX ADMIN — IBUPROFEN 800 MG: 800 TABLET, FILM COATED ORAL at 06:18

## 2024-08-16 RX ADMIN — CYCLOBENZAPRINE HYDROCHLORIDE 10 MG: 5 TABLET, FILM COATED ORAL at 02:08

## 2024-08-16 RX ADMIN — ACETAMINOPHEN 975 MG: 325 TABLET, FILM COATED ORAL at 11:53

## 2024-08-16 RX ADMIN — IBUPROFEN 800 MG: 800 TABLET, FILM COATED ORAL at 00:23

## 2024-08-16 RX ADMIN — ACETAMINOPHEN 975 MG: 325 TABLET, FILM COATED ORAL at 00:23

## 2024-08-16 RX ADMIN — OXYCODONE HYDROCHLORIDE 10 MG: 5 TABLET ORAL at 00:43

## 2024-08-16 RX ADMIN — ACETAMINOPHEN 975 MG: 325 TABLET, FILM COATED ORAL at 06:18

## 2024-08-16 RX ADMIN — SENNOSIDES AND DOCUSATE SODIUM 2 TABLET: 50; 8.6 TABLET ORAL at 09:22

## 2024-08-16 ASSESSMENT — ACTIVITIES OF DAILY LIVING (ADL)
ADLS_ACUITY_SCORE: 20

## 2024-08-16 NOTE — LACTATION NOTE
This note was copied from a baby's chart.  Follow Up Consult    Infant Name: Meagan    Infant's Primary Care Clinic: [facility]    Maternal Assessment: Heike reports significant cramping with breastfeeding or pumping. LC reviews reasons for cramping with feedings in immediate postpartum period and provides reassurance that it generally only lasts the first few days. Reinforced importance of regular breast stimulation to support establishing milk supply.       Infant Assessment:  Meagan has age appropriate output and weight loss.  Has been primarily bottle feeding formula.     Weight Change Since Birth: -8% at 3 day old      Feeding History: BF attempts documented a few times soon after birth, Heike has not been feeling well since delivery and d/t this has mostly been formula feeding.  Shares desire to breastfeed but has been experiencing discomfort and is concerned she does not have enough milk.  LC reviews normal course of lactation and importance of stimulation of breasts. Encourages bringing baby to breast, even for a brief period before offering formula.     Education:   [x] Stages of milk production  [x] How to tell when baby is finished  [x] How to tell if baby is getting enough  [x] Infant Feeding Log  [x] Tips to prevent engorgement  [x] Signs of engorgement  [x] Tips to manage engorgement  [x] Pumping recommendations (based on patient need)  [x] Inpatient breastfeeding support  [x] Outpatient lactation resources    Handouts: Infant Feeding Log (Week 1, Your Guide to Postpartum &  Care Book) and [facility] Lactation Resources    Home Breast Pump: Spectra pump dispensed for home     Plan: Continue breastfeeding on cue with a goal of 8-12 feedings per day. Encourage frequent skin to skin, breast massage and hand expression.     If choosing to formula feed vs direct breastfeeding mother should pump or 15 minutes to support establishing full milk supply.        Encouraged follow up with outpatient  lactation consultant  as needed after discharge. Family plans to follow up with [facility].       Julissa Lora RN, IBCLC   Lactation Consultant  Vocera: [facility] [phone]  Office: [phone]

## 2024-08-16 NOTE — PROGRESS NOTES
Post Partum Progress Note  POD#3    Subjective:  She is resting comfortably in bed this morning. She complains of some GERD but otherwise no complaints. Pain is improving and well controlled on current medication regimen. She is tolerating PO intake. Lochia present and wnl.  She is voiding without difficulty. She has passed flatus and has had a BM. She is ambulating without dizziness or difficulty.  She denies headache, changes in vision, nausea/vomiting, chest pain, shortness of breath, RUQ pain, or worsening edema.  Plans to breast and bottle feed.    Objective:  Vitals:    08/15/24 1708 08/15/24 2105 24 0209 24 0623   BP: 137/87 127/73 127/72 132/85   BP Location:  Left arm Right arm Right arm   Patient Position:  Semi-Howard's Semi-Howard's Semi-Howard's   Cuff Size:  Adult Regular Adult Regular Adult Regular   Pulse: 96 92 96 91   Resp:  14 14 16   Temp:  98.3  F (36.8  C)  97.8  F (36.6  C)   TempSrc:  Oral  Oral   SpO2:       Weight:       Height:           General: NAD. A&Ox3.  CV: Regular rate, well perfused.   Pulm: Normal respiratory effort.  Abd: Soft, non-tender, mildly-distended. Fundus is firm and at the umbilicus.    Incision: incision is clean, dry, intact  Ext: 1+ lower extremity edema bilaterally. No calf tenderness.    Assessment/Plan:  Heike Musa is a 34 year old  female who is POD#3 s/p PLTCS in the setting of preeclampsia with severe features and unstable lie. She is meeting criteria for discharge     # Routine postpartum cares   - Encourage routine post-operative goals including ambulation  - PNC: Rh positive. Rubella immune. No intervention indicated.  - Pain: well controlled on current regimen  - Heme: Hgb 12.5> (miso) >11.2. Appropriate Hgb, no ABLA, as Hgb > 11.0  - GI: continue anti-emetics and stool softeners as needed. Tums for GERD PRN  - : s/p Schneider, voiding spontaneously  - Infant: Stable in room  - Feeding: Plans on bottle feeding   - BC: Not discussed       # Preeclampsia with severe features  - s/p 24 hr magnesium gtt   - D#2 nifedipine 60/30 mg.   - Two mild ranges yesterday prior to increased nifedipine dosing in the PM. Normotensive overnight.   - Asymptomatic, no concerning s/sx   - HELLP labs wnl outside of mild thrombocytopenia. Plt 137>128 > 142. Stable, mild.      # GDMA1  - fBG 133  - Plan for 6 wk PP GTT     Dispo: Medically Ready for Discharge: Anticipated Today    Michael Ramos MD  Obstetrics and Gynecology, PGY-2  08/16/2024 8:03 AM

## 2024-08-16 NOTE — PLAN OF CARE
Postpartum assessments WDL. VSS. Blood pressures have been within range, Pt denies headache, vision changes, SOB, RUQ pain. Reflexes 2 +, no clonus. Pt sister has been bottle feeding and changing diapers, she is attentive to infant cues. Reports good pain management with PO medications. Encouraged ambulation and drinking more fluids. Plan of care ongoing, no concerns as of present.      Goal Outcome Evaluation:  Problem: Hypertensive Disorders in Pregnancy  Goal: Patient-Fetal Stabilization  Outcome: Progressing     Problem: Postpartum ( Delivery)  Goal: Optimal Pain Control and Function  Outcome: Progressing  Intervention: Prevent or Manage Pain  Recent Flowsheet Documentation  Taken 2024 0139 by Delores Yao, RN  Pain Management Interventions: medication (see MAR)  Taken 2024 0023 by Delores Yao, RN  Pain Management Interventions: medication (see MAR)

## 2024-08-16 NOTE — DISCHARGE INSTRUCTIONS
Warning Signs after Having a Baby    Keep this paper on your fridge or somewhere else where you can see it.    Call your provider if you have any of these symptoms up to 12 weeks after having your baby.    Thoughts of hurting yourself or your baby  Pain in your chest or trouble breathing  Severe headache not helped by pain medicine  Eyesight concerns (blurry vision, seeing spots or flashes of light, other changes to eyesight)  Fainting, shaking or other signs of a seizure    Call 9-1-1 if you feel that it is an emergency.     The symptoms below can happen to anyone after giving birth. They can be very serious. Call your provider if you have any of these warning signs.    My provider s phone number: _______________________    Losing too much blood (hemorrhage)    Call your provider if you soak through a pad in less than an hour or pass blood clots bigger than a golf ball. These may be signs that you are bleeding too much.    Blood clots in the legs or lungs    After you give birth, your body naturally clots its blood to help prevent blood loss. Sometimes this increased clotting can happen in other areas of the body, like the legs or lungs. This can block your blood flow and be very dangerous.     Call your provider if you:  Have a red, swollen spot on the back of your leg that is warm or painful when you touch it.   Are coughing up blood.     Infection    Call your provider if you have any of these symptoms:  Fever of 100.4 F (38 C) or higher.  Pain or redness around your stitches if you had an incision.   Any yellow, white, or green fluid coming from places where you had stitches or surgery.    Mood Problems (postpartum depression)    Many people feel sad or have mood changes after having a baby. But for some people, these mood swings are worse.     Call your provider right away if you feel so anxious or nervous that you can't care for yourself or your baby.    Preeclampsia (high blood pressure)    Even if you  didn't have high blood pressure when you were pregnant, you are at risk for the high blood pressure disease called preeclampsia. This risk can last up to 12 weeks after giving birth.     Call your provider if you have:   Pain on your right side under your rib cage  Sudden swelling in the hands and face    Remember: You know your body. If something doesn't feel right, get medical help.     For informational purposes only. Not to replace the advice of your health care provider. Copyright 2020 [facility]. All rights reserved. Clinically reviewed by Annabel Ward, RNC-OB, MSN. Goji [id number] - Rev 02/23.

## 2024-08-16 NOTE — PLAN OF CARE
Goal Outcome Evaluation:      Plan of Care Reviewed With: patient, spouse, family, other (see comments)    Overall Patient Progress: improvingOverall Patient Progress: improving    Data: Vital signs within normal limits. Postpartum checks within normal limits - see flow record. Patient had an increase in Nifedipine overnight. Patient eating and drinking normally. Patient able to empty bladder independently and is up ambulating. No apparent signs of infection. Incision healing well. Patient performing self cares and is able to care for infant.  Action: Patient medicated during the shift for pain and cramping. See MAR. Patient reassessed within 1 hour after each medication and pain was improved - patient stated she was comfortable. Patient education done about discharge instructions. See flow record.  Response: Positive attachment behaviors observed with infant. Support persons was present.   Plan: Anticipate discharge today.    Discharge teaching completed with in person .

## 2024-08-17 ENCOUNTER — NURSE TRIAGE (OUTPATIENT)
Dept: NURSING | Facility: CLINIC | Age: 34
End: 2024-08-17
Payer: COMMERCIAL

## 2024-08-18 NOTE — TELEPHONE ENCOUNTER
Nurse Triage SBAR    Is this a 2nd Level Triage? NO    Situation:   Discharged yesterday afternoon from L&D  No BM for the last 36 hours  Stool softner - started in [facility] hosptial  [facility]  [facility] at 1[facility]  No fever or vomiting  Has not used a suppository yet  Rectal pain    Background: 24 -     Assessment: mod-severe constipation    Protocol Recommended Disposition:   See PCP Within 3 Days    Recommendation: na          Does the patient meet one of the following criteria for ADS visit consideration? No      Reason for Disposition   Unable to have a bowel movement (BM) without laxative or enema    Additional Information   Negative: Fever > 100.4 F (38.0 C)   Negative: [1] Vomiting AND [2] contains bile (green color)   Negative: Patient sounds very sick or weak to the triager   Negative: [1] Vomiting AND [2] abdomen looks much more swollen than usual   Negative: [1] Sudden onset rectal pain (straining, rectal pressure or fullness) AND [2] NOT better after SITZ bath or glycerin suppository   Negative: Last bowel movement (BM) > 4 days ago   Negative: Leaking stool    Protocols used: Postpartum - Constipation-A-

## 2024-08-19 ENCOUNTER — APPOINTMENT (OUTPATIENT)
Dept: INTERPRETER SERVICES | Facility: CLINIC | Age: 34
End: 2024-08-19
Payer: COMMERCIAL

## 2024-08-19 ENCOUNTER — TELEPHONE (OUTPATIENT)
Dept: OBGYN | Facility: CLINIC | Age: 34
End: 2024-08-19
Payer: COMMERCIAL

## 2024-08-19 NOTE — TELEPHONE ENCOUNTER
Sonya from [facility] called through the call center.      She was supposed to see Heike for a pre-e follow up and blood pressure check.  Sonya called Heike several times last night and today, and finally reached her. Heike stated she is following up with her midwives in person and does not need home care.      There are no recorded visits at [facility] for Heike, but her sticky note states she is seen at [facility], so routing to [facility] pool to see if they know whether or not Heike has been seen there.

## 2024-09-23 ENCOUNTER — LAB REQUISITION (OUTPATIENT)
Dept: LAB | Facility: CLINIC | Age: 34
End: 2024-09-23
Payer: COMMERCIAL

## 2024-09-23 DIAGNOSIS — Z12.4 ENCOUNTER FOR SCREENING FOR MALIGNANT NEOPLASM OF CERVIX: ICD-10-CM

## 2024-09-23 LAB
HPV HR 12 DNA CVX QL NAA+PROBE: NEGATIVE
HPV16 DNA CVX QL NAA+PROBE: NEGATIVE
HPV18 DNA CVX QL NAA+PROBE: NEGATIVE
HUMAN PAPILLOMA VIRUS FINAL DIAGNOSIS: NORMAL

## 2024-09-23 PROCEDURE — G0145 SCR C/V CYTO,THINLAYER,RESCR: HCPCS | Mod: ORL | Performed by: ADVANCED PRACTICE MIDWIFE

## 2024-09-23 PROCEDURE — 87624 HPV HI-RISK TYP POOLED RSLT: CPT | Mod: ORL | Performed by: ADVANCED PRACTICE MIDWIFE

## 2024-09-25 LAB
BKR AP ASSOCIATED HPV REPORT: NORMAL
BKR LAB AP GYN ADEQUACY: NORMAL
BKR LAB AP GYN INTERPRETATION: NORMAL
BKR LAB AP LMP: NORMAL
BKR LAB AP PREVIOUS ABNL DX: NORMAL
BKR LAB AP PREVIOUS ABNORMAL: NORMAL
PATH REPORT.COMMENTS IMP SPEC: NORMAL
PATH REPORT.COMMENTS IMP SPEC: NORMAL
PATH REPORT.RELEVANT HX SPEC: NORMAL

## 2024-10-03 ENCOUNTER — APPOINTMENT (OUTPATIENT)
Dept: INTERPRETER SERVICES | Facility: CLINIC | Age: 34
End: 2024-10-03
Payer: COMMERCIAL

## 2024-10-24 ENCOUNTER — MEDICAL CORRESPONDENCE (OUTPATIENT)
Dept: HEALTH INFORMATION MANAGEMENT | Facility: CLINIC | Age: 34
End: 2024-10-24
Payer: COMMERCIAL

## 2024-10-25 ENCOUNTER — TRANSCRIBE ORDERS (OUTPATIENT)
Dept: OTHER | Age: 34
End: 2024-10-25

## 2024-10-25 DIAGNOSIS — R68.89 LIGHT SENSITIVITY: ICD-10-CM

## 2024-10-25 DIAGNOSIS — H57.10 EYE PAIN: Primary | ICD-10-CM

## 2024-11-07 ENCOUNTER — LAB REQUISITION (OUTPATIENT)
Dept: LAB | Facility: CLINIC | Age: 34
End: 2024-11-07
Payer: COMMERCIAL

## 2024-11-07 PROCEDURE — 84443 ASSAY THYROID STIM HORMONE: CPT | Mod: ORL | Performed by: INTERNAL MEDICINE

## 2024-11-07 PROCEDURE — 80053 COMPREHEN METABOLIC PANEL: CPT | Mod: ORL | Performed by: INTERNAL MEDICINE

## 2024-11-08 LAB
ALBUMIN SERPL BCG-MCNC: 4.1 G/DL (ref 3.5–5.2)
ALP SERPL-CCNC: 130 U/L (ref 40–150)
ALT SERPL W P-5'-P-CCNC: 19 U/L (ref 0–50)
ANION GAP SERPL CALCULATED.3IONS-SCNC: 10 MMOL/L (ref 7–15)
AST SERPL W P-5'-P-CCNC: 20 U/L (ref 0–45)
BILIRUB SERPL-MCNC: 0.6 MG/DL
BUN SERPL-MCNC: 9.7 MG/DL (ref 6–20)
CALCIUM SERPL-MCNC: 9.3 MG/DL (ref 8.8–10.4)
CHLORIDE SERPL-SCNC: 105 MMOL/L (ref 98–107)
CREAT SERPL-MCNC: 0.5 MG/DL (ref 0.51–0.95)
EGFRCR SERPLBLD CKD-EPI 2021: >90 ML/MIN/1.73M2
GLUCOSE SERPL-MCNC: 98 MG/DL (ref 70–99)
HCO3 SERPL-SCNC: 24 MMOL/L (ref 22–29)
POTASSIUM SERPL-SCNC: 4.6 MMOL/L (ref 3.4–5.3)
PROT SERPL-MCNC: 7.1 G/DL (ref 6.4–8.3)
SODIUM SERPL-SCNC: 139 MMOL/L (ref 135–145)
TSH SERPL DL<=0.005 MIU/L-ACNC: 0.49 UIU/ML (ref 0.3–4.2)

## 2024-11-09 ENCOUNTER — HOSPITAL ENCOUNTER (EMERGENCY)
Facility: CLINIC | Age: 34
Discharge: HOME | End: 2024-11-09
Attending: EMERGENCY MEDICINE | Admitting: EMERGENCY MEDICINE
Payer: COMMERCIAL

## 2024-11-09 VITALS
BODY MASS INDEX: 26.12 KG/M2 | DIASTOLIC BLOOD PRESSURE: 86 MMHG | RESPIRATION RATE: 16 BRPM | TEMPERATURE: 98.2 F | HEIGHT: 66 IN | OXYGEN SATURATION: 96 % | WEIGHT: 162.5 LBS | SYSTOLIC BLOOD PRESSURE: 132 MMHG | HEART RATE: 69 BPM

## 2024-11-09 DIAGNOSIS — H81.10 BENIGN PAROXYSMAL POSITIONAL VERTIGO, UNSPECIFIED LATERALITY: ICD-10-CM

## 2024-11-09 LAB
ANION GAP SERPL CALCULATED.3IONS-SCNC: 10 MMOL/L (ref 7–15)
BASOPHILS # BLD AUTO: 0 10E3/UL (ref 0–0.2)
BASOPHILS NFR BLD AUTO: 0 %
BUN SERPL-MCNC: 14 MG/DL (ref 6–20)
CALCIUM SERPL-MCNC: 9.5 MG/DL (ref 8.8–10.4)
CHLORIDE SERPL-SCNC: 103 MMOL/L (ref 98–107)
CREAT SERPL-MCNC: 0.62 MG/DL (ref 0.51–0.95)
EGFRCR SERPLBLD CKD-EPI 2021: >90 ML/MIN/1.73M2
EOSINOPHIL # BLD AUTO: 0.1 10E3/UL (ref 0–0.7)
EOSINOPHIL NFR BLD AUTO: 1 %
ERYTHROCYTE [DISTWIDTH] IN BLOOD BY AUTOMATED COUNT: 12.1 % (ref 10–15)
GLUCOSE SERPL-MCNC: 101 MG/DL (ref 70–99)
HCG SERPL QL: NEGATIVE
HCO3 SERPL-SCNC: 26 MMOL/L (ref 22–29)
HCT VFR BLD AUTO: 40.3 % (ref 35–47)
HGB BLD-MCNC: 13.9 G/DL (ref 11.7–15.7)
IMM GRANULOCYTES # BLD: 0 10E3/UL
IMM GRANULOCYTES NFR BLD: 0 %
LYMPHOCYTES # BLD AUTO: 2.8 10E3/UL (ref 0.8–5.3)
LYMPHOCYTES NFR BLD AUTO: 31 %
MCH RBC QN AUTO: 30.4 PG (ref 26.5–33)
MCHC RBC AUTO-ENTMCNC: 34.5 G/DL (ref 31.5–36.5)
MCV RBC AUTO: 88 FL (ref 78–100)
MONOCYTES # BLD AUTO: 0.8 10E3/UL (ref 0–1.3)
MONOCYTES NFR BLD AUTO: 9 %
NEUTROPHILS # BLD AUTO: 5.2 10E3/UL (ref 1.6–8.3)
NEUTROPHILS NFR BLD AUTO: 59 %
NRBC # BLD AUTO: 0 10E3/UL
NRBC BLD AUTO-RTO: 0 /100
PLATELET # BLD AUTO: 259 10E3/UL (ref 150–450)
POTASSIUM SERPL-SCNC: 4.7 MMOL/L (ref 3.4–5.3)
RBC # BLD AUTO: 4.57 10E6/UL (ref 3.8–5.2)
SODIUM SERPL-SCNC: 139 MMOL/L (ref 135–145)
WBC # BLD AUTO: 8.9 10E3/UL (ref 4–11)

## 2024-11-09 PROCEDURE — 258N000003 HC RX IP 258 OP 636: Performed by: EMERGENCY MEDICINE

## 2024-11-09 PROCEDURE — 84703 CHORIONIC GONADOTROPIN ASSAY: CPT | Performed by: EMERGENCY MEDICINE

## 2024-11-09 PROCEDURE — 99284 EMERGENCY DEPT VISIT MOD MDM: CPT | Performed by: EMERGENCY MEDICINE

## 2024-11-09 PROCEDURE — 85004 AUTOMATED DIFF WBC COUNT: CPT | Performed by: EMERGENCY MEDICINE

## 2024-11-09 PROCEDURE — 99284 EMERGENCY DEPT VISIT MOD MDM: CPT | Mod: 25 | Performed by: EMERGENCY MEDICINE

## 2024-11-09 PROCEDURE — 96361 HYDRATE IV INFUSION ADD-ON: CPT | Performed by: EMERGENCY MEDICINE

## 2024-11-09 PROCEDURE — 250N000011 HC RX IP 250 OP 636: Performed by: EMERGENCY MEDICINE

## 2024-11-09 PROCEDURE — 36415 COLL VENOUS BLD VENIPUNCTURE: CPT | Performed by: EMERGENCY MEDICINE

## 2024-11-09 PROCEDURE — 96374 THER/PROPH/DIAG INJ IV PUSH: CPT | Performed by: EMERGENCY MEDICINE

## 2024-11-09 PROCEDURE — 250N000013 HC RX MED GY IP 250 OP 250 PS 637: Performed by: EMERGENCY MEDICINE

## 2024-11-09 PROCEDURE — 80048 BASIC METABOLIC PNL TOTAL CA: CPT | Performed by: EMERGENCY MEDICINE

## 2024-11-09 RX ORDER — ACETAMINOPHEN 160 MG
1 TABLET,DISINTEGRATING ORAL DAILY
COMMUNITY
Start: 2024-10-14

## 2024-11-09 RX ORDER — MECLIZINE HYDROCHLORIDE 25 MG/1
25 TABLET ORAL ONCE
Status: COMPLETED | OUTPATIENT
Start: 2024-11-09 | End: 2024-11-09

## 2024-11-09 RX ORDER — ONDANSETRON 2 MG/ML
4 INJECTION INTRAMUSCULAR; INTRAVENOUS ONCE
Status: COMPLETED | OUTPATIENT
Start: 2024-11-09 | End: 2024-11-09

## 2024-11-09 RX ORDER — MECLIZINE HYDROCHLORIDE 25 MG/1
25 TABLET ORAL 3 TIMES DAILY PRN
Qty: 30 TABLET | Refills: 0 | Status: SHIPPED | OUTPATIENT
Start: 2024-11-09

## 2024-11-09 RX ADMIN — SODIUM CHLORIDE 500 ML: 9 INJECTION, SOLUTION INTRAVENOUS at 20:55

## 2024-11-09 RX ADMIN — MECLIZINE HYDROCHLORIDE 25 MG: 25 TABLET ORAL at 20:49

## 2024-11-09 RX ADMIN — ONDANSETRON 4 MG: 2 INJECTION INTRAMUSCULAR; INTRAVENOUS at 20:55

## 2024-11-09 ASSESSMENT — ACTIVITIES OF DAILY LIVING (ADL)
ADLS_ACUITY_SCORE: 0

## 2024-11-09 ASSESSMENT — COLUMBIA-SUICIDE SEVERITY RATING SCALE - C-SSRS
6. HAVE YOU EVER DONE ANYTHING, STARTED TO DO ANYTHING, OR PREPARED TO DO ANYTHING TO END YOUR LIFE?: NO
1. IN THE PAST MONTH, HAVE YOU WISHED YOU WERE DEAD OR WISHED YOU COULD GO TO SLEEP AND NOT WAKE UP?: NO
2. HAVE YOU ACTUALLY HAD ANY THOUGHTS OF KILLING YOURSELF IN THE PAST MONTH?: NO

## 2024-11-10 NOTE — ED TRIAGE NOTES
Triage Assessment (Adult)       Row Name 11/09/24 1846          Triage Assessment    Airway WDL WDL        Respiratory WDL    Respiratory WDL WDL        Skin Circulation/Temperature WDL    Skin Circulation/Temperature WDL WDL        Cardiac WDL    Cardiac WDL WDL        Peripheral/Neurovascular WDL    Peripheral Neurovascular WDL WDL        Cognitive/Neuro/Behavioral WDL    Cognitive/Neuro/Behavioral WDL WDL

## 2024-11-10 NOTE — DISCHARGE INSTRUCTIONS
You have been seen in the emergency department today for vertigo.  This is a problem with the inner ear where you can feel off balance, you can feel like the room is spinning, dizziness, and nausea.  Your blood tests today are normal.  We recommend that you take the meclizine medication as needed for dizziness/vertigo.  Be advised that this can cause sedation/sleepiness, so do not drive a vehicle after taking this.    Please follow-up with your primary clinic, you may benefit from referral for a very specific type of physical therapy which can help with this dizzy problem.

## 2024-11-10 NOTE — ED PROVIDER NOTES
"    [facility] EMERGENCY DEPARTMENT ([facility])    24      ED PROVIDER NOTE   History     Chief Complaint   Patient presents with    Dizziness     Patient reports she feels like the room spinning x 3 days now.Some nausea no vomiting     A  was used (Ipad).     Please note a [place] iPad  was used for the entirety of this interaction.    Heike Musa is a 34-year-old female who presents to the emergency department today complaining of 2 to 3 days of dizziness and nausea.  The dizziness is described as a room spinning type sensation.  It has been largely constant for the past 3 days but it is worsened at times.  She thinks that at times if she moves her head one direction it does tend to worsen.  She has been nauseated but has not vomited.  Denies any vision changes, no double vision or blurry vision.  Patient denies any nasal congestion, sore throat, ear pain, changes in hearing, ringing or roaring in the ears.  No cough, shortness of breath, or chest pain.  She has been nauseated but has not vomited.  No abdominal pain, no diarrhea.  No unilateral weakness or numbness.    Patient has a history of severe preeclampsia, delivered a baby via  on 2024.  She is supposed to be taking nifedipine, but states she has not taken this for 2 days.    Patient reports that she feels weak and fatigued and does not have much energy.  She does have a  at home.  She is not breast-feeding at present.    Record review shows that she does have a previous history of vertiginous migraine, and when asked about previous history of vertigo she does report that in 2018 she was referred to what sounds like vestibular rehab for similar symptoms.  She states she was given \"7 days of the medication to take for vertigo\" and never had any issues with it after that time.  She is supposed to be taking nifedipine, dose was 30 mg BID which had been decreased from 60 mg in the morning and 30 " mg at night.  On 10/24/2024 she did see her primary clinic, [facility] and she was told that she could go down to 30 mg daily and assess BP at home.  Patient states that she has not yet received her automatic blood pressure cuff so has not been checking her blood pressures at home.  However she has not been taking the nifedipine for 2 days as she attributes this to intermittent neck pain.     This part of the medical record was transcribed by Sabra Thompson Medical Scribe, from a dictation done by Jillian Osei MD.      Past Medical History  Past Medical History:   Diagnosis Date    Migraines      Past Surgical History:   Procedure Laterality Date     SECTION N/A 2024    Procedure:  section;  Surgeon: Delores Wooten MD;  [facility]: [facility] L[facility]     Cholecalciferol (VITAMIN D3) 50 MCG ( UT) CAPS  NIFEdipine ER OSMOTIC (ADALAT CC) 30 MG 24 hr tablet  NIFEdipine ER OSMOTIC (ADALAT CC) 60 MG 24 hr tablet  Prenatal Vit-Fe Fumarate-FA (PRENATAL MULTIVITAMIN W/IRON) 27-0.8 MG tablet  acetaminophen (TYLENOL) 325 MG tablet  benzocaine-menthol (DERMOPLAST) 20-0.5 % AERO  bisacodyl (DULCOLAX) 10 MG suppository  dibucaine 1 % OINT rectal ointment  diclofenac (VOLTAREN) 1 % topical gel  docusate sodium (COLACE) 100 MG capsule  doxylamine (UNISOM) 25 MG TABS tablet  hydrocortisone (CORTAID) 1 % external cream  ibuprofen (ADVIL/MOTRIN) 800 MG tablet  ibuprofen (ADVIL/MOTRIN) 800 MG tablet  meclizine (ANTIVERT) 25 MG tablet  phenylephrine-shark liver oil-mineral oil-petrolatum (PREPARATION H) 0.25-14-74.9 % rectal ointment  psyllium (METAMUCIL SMOOTH TEXTURE) 28 % packet  witch hazel-glycerin (TUCKS) pad      No Known Allergies  Family History  Family History   Problem Relation Age of Onset    Hypertension Mother      Social History   Social History     Tobacco Use    Smoking status: Never    Smokeless tobacco: Never   Substance Use Topics    Alcohol use: No    Drug use: No      A complete review  "of systems was performed with pertinent positives and negatives noted in the HPI, and all other systems negative.    Physical Exam   BP: 136/84  Pulse: 98  Temp: 97.7  F (36.5  C)  Resp: 16  Height: 167.6 cm (5' 6\")  Weight: 73.7 kg (162 lb 8 oz)  SpO2: 97 %  Physical Exam  Vitals and nursing note reviewed.   Constitutional:       General: She is not in acute distress.     Appearance: She is not diaphoretic.   HENT:      Head: Atraumatic.      Mouth/Throat:      Mouth: Mucous membranes are moist.      Pharynx: Oropharynx is clear. No oropharyngeal exudate.   Eyes:      General: No scleral icterus.     Pupils: Pupils are equal, round, and reactive to light.   Cardiovascular:      Rate and Rhythm: Normal rate.      Pulses: Normal pulses.      Heart sounds: Normal heart sounds. No murmur heard.  Pulmonary:      Effort: No respiratory distress.      Breath sounds: Normal breath sounds.   Abdominal:      General: Bowel sounds are normal.      Palpations: Abdomen is soft.      Tenderness: There is no abdominal tenderness.   Musculoskeletal:         General: No tenderness.   Skin:     General: Skin is warm.      Findings: No rash.   Neurological:      General: No focal deficit present.      Mental Status: She is alert.      GCS: GCS eye subscore is 4. GCS verbal subscore is 5. GCS motor subscore is 6.      Cranial Nerves: Cranial nerves 2-12 are intact.      Sensory: Sensation is intact.      Motor: Motor function is intact.      Coordination: Coordination is intact.         ED Course, Procedures, & Data      Procedures                 Results for orders placed or performed during the hospital encounter of 11/09/24   Basic metabolic panel     Status: Abnormal   Result Value Ref Range    Sodium 139 135 - 145 mmol/L    Potassium 4.7 3.4 - 5.3 mmol/L    Chloride 103 98 - 107 mmol/L    Carbon Dioxide (CO2) 26 22 - 29 mmol/L    Anion Gap 10 7 - 15 mmol/L    Urea Nitrogen 14.0 6.0 - 20.0 mg/dL    Creatinine 0.62 0.51 - 0.95 " mg/dL    GFR Estimate >90 >60 mL/min/1.73m2    Calcium 9.5 8.8 - 10.4 mg/dL    Glucose 101 (H) 70 - 99 mg/dL   HCG qualitative pregnancy (blood)     Status: Normal   Result Value Ref Range    hCG Serum Qualitative Negative Negative   CBC with platelets and differential     Status: None   Result Value Ref Range    WBC Count 8.9 4.0 - 11.0 10e3/uL    RBC Count 4.57 3.80 - 5.20 10e6/uL    Hemoglobin 13.9 11.7 - 15.7 g/dL    Hematocrit 40.3 35.0 - 47.0 %    MCV 88 78 - 100 fL    MCH 30.4 26.5 - 33.0 pg    MCHC 34.5 31.5 - 36.5 g/dL    RDW 12.1 10.0 - 15.0 %    Platelet Count 259 150 - 450 10e3/uL    % Neutrophils 59 %    % Lymphocytes 31 %    % Monocytes 9 %    % Eosinophils 1 %    % Basophils 0 %    % Immature Granulocytes 0 %    NRBCs per 100 WBC 0 <1 /100    Absolute Neutrophils 5.2 1.6 - 8.3 10e3/uL    Absolute Lymphocytes 2.8 0.8 - 5.3 10e3/uL    Absolute Monocytes 0.8 0.0 - 1.3 10e3/uL    Absolute Eosinophils 0.1 0.0 - 0.7 10e3/uL    Absolute Basophils 0.0 0.0 - 0.2 10e3/uL    Absolute Immature Granulocytes 0.0 <=0.4 10e3/uL    Absolute NRBCs 0.0 10e3/uL   CBC with Platelets & Differential     Status: None    Narrative    The following orders were created for panel order CBC with Platelets & Differential.  Procedure                               Abnormality         Status                     ---------                               -----------         ------                     CBC with platelets and d...[[id number]]                      Final result                 Please view results for these tests on the individual orders.     Medications   sodium chloride 0.9% BOLUS 500 mL (0 mLs Intravenous Stopped 11/9/24 2323)   ondansetron (ZOFRAN) injection 4 mg (4 mg Intravenous $Given 11/9/24 2055)   meclizine (ANTIVERT) tablet 25 mg (25 mg Oral $Given 11/9/24 2049)     Labs Ordered and Resulted from Time of ED Arrival to Time of ED Departure   BASIC METABOLIC PANEL - Abnormal       Result Value    Sodium 139       Potassium 4.7      Chloride 103      Carbon Dioxide (CO2) 26      Anion Gap 10      Urea Nitrogen 14.0      Creatinine 0.62      GFR Estimate >90      Calcium 9.5      Glucose 101 (*)    HCG QUALITATIVE PREGNANCY - Normal    hCG Serum Qualitative Negative     CBC WITH PLATELETS AND DIFFERENTIAL    WBC Count 8.9      RBC Count 4.57      Hemoglobin 13.9      Hematocrit 40.3      MCV 88      MCH 30.4      MCHC 34.5      RDW 12.1      Platelet Count 259      % Neutrophils 59      % Lymphocytes 31      % Monocytes 9      % Eosinophils 1      % Basophils 0      % Immature Granulocytes 0      NRBCs per 100 WBC 0      Absolute Neutrophils 5.2      Absolute Lymphocytes 2.8      Absolute Monocytes 0.8      Absolute Eosinophils 0.1      Absolute Basophils 0.0      Absolute Immature Granulocytes 0.0      Absolute NRBCs 0.0       No orders to display          Critical care was not performed.     Medical Decision Making  The patient's presentation was of moderate complexity (new symptoms likely related to previous history of vertigo which has not been a problem in many years).    The patient's evaluation involved:  strong consideration of a test (see separate area of note for details) that was ultimately deferred  ordering and/or review of 3+ test(s) in this encounter (see separate area of note for details)    The patient's management necessitated moderate risk (prescription drug management including medications given in the ED).    Assessment & Plan    Patient presents with the above complaints.  On my evaluation she is alert, cooperative, no acute distress.  She is neurologically intact.  Blood pressure is 136/84.    Patient is now almost 3 months status post .  She did have preeclampsia with severe features during her delivery and postpartum state.  However, she is well past the 6-week timeframe that we typically worry about ongoing preeclampsia and so I do not believe that this is what is going on at present.  I  briefly considered neuroimaging but at this point her symptoms are far more consistent with BPPV.    We did establish IV access and we did draw blood for laboratory analysis.  CBC is within normal limits with normal white count, normal hemoglobin of normal platelet count.  Basic metabolic panel is within normal limits, hCG is negative.  Patient was given IV fluids here in the emergency department as well as a dose of Zofran and a dose of meclizine.  After this patient is feeling much improved.    I will discharge her with a prescription for meclizine and instructions to follow-up with her primary clinic.  She may ultimately benefit from referral for vestibular rehab, but this is something her primary clinic can certainly help her with.  Patient verbalizes understanding    This part of the medical record was transcribed by Sarba Thompson Medical Scribe, from a dictation done by Jillian Osei MD.      I have reviewed the nursing notes. I have reviewed the findings, diagnosis, plan and need for follow up with the patient.    Discharge Medication List as of 11/9/2024 11:39 PM        START taking these medications    Details   meclizine (ANTIVERT) 25 MG tablet Take 1 tablet (25 mg) by mouth 3 times daily as needed for dizziness., Disp-30 tablet, R-0, Local Print             Final diagnoses:   Benign paroxysmal positional vertigo, unspecified laterality       Jillian Osei MD  [facility] EMERGENCY DEPARTMENT  11/9/2024     Jillian Osei MD  11/10/24 0019

## 2024-11-28 ENCOUNTER — APPOINTMENT (OUTPATIENT)
Dept: GENERAL RADIOLOGY | Facility: CLINIC | Age: 34
End: 2024-11-28
Attending: EMERGENCY MEDICINE
Payer: COMMERCIAL

## 2024-11-28 ENCOUNTER — HOSPITAL ENCOUNTER (EMERGENCY)
Facility: CLINIC | Age: 34
Discharge: HOME | End: 2024-11-29
Attending: EMERGENCY MEDICINE | Admitting: EMERGENCY MEDICINE
Payer: COMMERCIAL

## 2024-11-28 DIAGNOSIS — M79.671 RIGHT FOOT PAIN: ICD-10-CM

## 2024-11-28 PROCEDURE — 73630 X-RAY EXAM OF FOOT: CPT | Mod: LT

## 2024-11-28 PROCEDURE — 99283 EMERGENCY DEPT VISIT LOW MDM: CPT | Performed by: EMERGENCY MEDICINE

## 2024-11-28 PROCEDURE — 73650 X-RAY EXAM OF HEEL: CPT | Mod: 50

## 2024-11-28 PROCEDURE — 99284 EMERGENCY DEPT VISIT MOD MDM: CPT | Performed by: EMERGENCY MEDICINE

## 2024-11-28 ASSESSMENT — ACTIVITIES OF DAILY LIVING (ADL): ADLS_ACUITY_SCORE: 57

## 2024-11-29 VITALS
HEART RATE: 75 BPM | RESPIRATION RATE: 15 BRPM | TEMPERATURE: 97.6 F | SYSTOLIC BLOOD PRESSURE: 123 MMHG | DIASTOLIC BLOOD PRESSURE: 78 MMHG | OXYGEN SATURATION: 99 %

## 2024-11-29 PROCEDURE — 250N000013 HC RX MED GY IP 250 OP 250 PS 637: Performed by: EMERGENCY MEDICINE

## 2024-11-29 RX ORDER — LIDOCAINE 4 G/G
1 PATCH TOPICAL ONCE
Status: DISCONTINUED | OUTPATIENT
Start: 2024-11-29 | End: 2024-11-29 | Stop reason: HOSPADM

## 2024-11-29 RX ADMIN — LIDOCAINE 1 PATCH: 4 PATCH TOPICAL at 01:23

## 2024-11-29 ASSESSMENT — ACTIVITIES OF DAILY LIVING (ADL): ADLS_ACUITY_SCORE: 57

## 2024-11-29 NOTE — ED PROVIDER NOTES
[facility] EMERGENCY DEPARTMENT ([facility])    24       ED PROVIDER NOTE    History     Chief Complaint   Patient presents with    Foot Pain     HPI  Heike Musa is a 34 year old female with a past medical history of gestational diabetes, preeclampsia, and migraine, who presents to the ED for evaluation of foot pain. Patient presents today with her brother. Patient reports constant severe right heel pain starting 2 weeks ago. Patient reports taking ibuprofen and icing her feet for the pain with little change in condition. Patient states the pain worsens when putting her feet down on the ground. Patient states she  has been wearing crocs and lighter shoes for the weather. Patient notes history of similar symptoms in 2019 where she got a cream and shoe from orthopedics that didn't help and the pain at the time went away on its own. Patient denies any trauma or injury.    Past Medical History  Past Medical History:   Diagnosis Date    Migraines      Past Surgical History:   Procedure Laterality Date     SECTION N/A 2024    Procedure:  section;  Surgeon: Delores Wooten MD;  [facility]: [facility]     ibuprofen (ADVIL/MOTRIN) 800 MG tablet  acetaminophen (TYLENOL) 325 MG tablet  benzocaine-menthol (DERMOPLAST) 20-0.5 % AERO  bisacodyl (DULCOLAX) 10 MG suppository  Cholecalciferol (VITAMIN D3) 50 MCG ( UT) CAPS  dibucaine 1 % OINT rectal ointment  diclofenac (VOLTAREN) 1 % topical gel  docusate sodium (COLACE) 100 MG capsule  doxylamine (UNISOM) 25 MG TABS tablet  hydrocortisone (CORTAID) 1 % external cream  ibuprofen (ADVIL/MOTRIN) 800 MG tablet  meclizine (ANTIVERT) 25 MG tablet  NIFEdipine ER OSMOTIC (ADALAT CC) 30 MG 24 hr tablet  NIFEdipine ER OSMOTIC (ADALAT CC) 60 MG 24 hr tablet  phenylephrine-shark liver oil-mineral oil-petrolatum (PREPARATION H) 0.25-14-74.9 % rectal ointment  Prenatal Vit-Fe Fumarate-FA (PRENATAL MULTIVITAMIN W/IRON) 27-0.8 MG tablet  psyllium  (METAMUCIL SMOOTH TEXTURE) 28 % packet  witch hazel-glycerin (TUCKS) pad      No Known Allergies  Family History  Family History   Problem Relation Age of Onset    Hypertension Mother      Social History   Social History     Tobacco Use    Smoking status: Never    Smokeless tobacco: Never   Substance Use Topics    Alcohol use: No    Drug use: No      A complete review of systems was performed with pertinent positives and negatives noted in the HPI, and all other systems negative.    Physical Exam   BP: (!) 152/86  Pulse: 72  Temp: 97.7  F (36.5  C)  Resp: 16  SpO2: 96 %  Physical Exam  General: Afebrile, no acute distress   HEENT: Normocephalic, atraumatic, conjunctivae normal. MMM  Neck: non-tender, supple  Cardio: regular rate. regular rhythm   Resp: Normal work of breathing, no respiratory distress, lungs clear bilaterally, no wheezing, rhonchi, rales  Chest/Back: no visual signs of trauma, no CVA tenderness   Abdomen: soft, non distension, no tenderness, no peritoneal signs   Neuro: alert and fully oriented. CN II-XII grossly intact. Grossly normal strength and sensation in all extremities.   MSK: no deformities. Normal range of motion at knee, foot, ankle, no swelling, erythema, no evidence of trauma. +TTP right heel  Integumentary/Skin: no rash visualized, normal color  Psych: normal affect, normal behavior      ED Course, Procedures, & Data      Procedures    Results for orders placed or performed during the hospital encounter of 11/28/24   XR Calcaneus Right G/E 2 Views     Status: None    Narrative    EXAM: XR CALCANEUS RIGHT G/E 2 VIEWS  LOCATION: [facility]  DATE: 11/28/2024    INDICATION: pain  COMPARISON: None.      Impression    IMPRESSION: Within normal limits. No fracture.   Foot  XR, G/E 3 views, right     Status: None    Narrative    EXAM: XR FOOT RIGHT G/E 3 VIEWS  LOCATION: [facility]  DATE:  11/28/2024    INDICATION: pain  COMPARISON: None.      Impression    IMPRESSION: Normal joint spaces and alignment. No fracture.   Foot XR, G/E 3 views, left     Status: None    Narrative    EXAM: XR FOOT LEFT G/E 3 VIEWS  LOCATION: [facility]  DATE: 11/28/2024    INDICATION: Left foot pain.  COMPARISON: X-ray bilateral calcaneus two views 5/5/2021.      Impression    IMPRESSION: Anatomic alignment of the bones and joints of the left foot without acute fracture or dislocation. Slight degenerative changes left 1st MTP and a few IP joints. Tiny plantar calcaneal spur has developed.   XR Calcaneus Left G/E 2 Views     Status: None    Narrative    EXAM: XR CALCANEUS LEFT G/E 2 VIEWS  LOCATION: [facility]  DATE: 11/28/2024    INDICATION: Left foot pain for 2 weeks.  COMPARISON:   1. X-ray left foot 3 views 11/28/2024 at 2342 hours.  2. X-ray bilateral calcaneus 2 views 5/5/2021 at 1043 hours.      Impression    IMPRESSION: Anatomic alignment of the left calcaneus. No fracture or dislocation. Minor plantar calcaneal spur has developed.     Medications   Lidocaine (LIDOCARE) 4 % Patch 1 patch (has no administration in time range)     Labs Ordered and Resulted from Time of ED Arrival to Time of ED Departure - No data to display  Foot  XR, G/E 3 views, right   Final Result   IMPRESSION: Normal joint spaces and alignment. No fracture.      Foot XR, G/E 3 views, left   Final Result   IMPRESSION: Anatomic alignment of the bones and joints of the left foot without acute fracture or dislocation. Slight degenerative changes left 1st MTP and a few IP joints. Tiny plantar calcaneal spur has developed.      XR Calcaneus Right G/E 2 Views   Final Result   IMPRESSION: Within normal limits. No fracture.      XR Calcaneus Left G/E 2 Views   Final Result   IMPRESSION: Anatomic alignment of the left calcaneus. No fracture or dislocation. Minor  plantar calcaneal spur has developed.             Critical care was not performed.     Medical Decision Making  The patient's presentation was of low complexity (an acute and uncomplicated illness or injury).    The patient's evaluation involved:  review of external note(s) from 2 sources (prior ED notes)  ordering and/or review of 2 test(s) in this encounter (see separate area of note for details)  independent interpretation of testing performed by another health professional (x-rays)    The patient's management necessitated moderate risk (prescription drug management including medications given in the ED).    Assessment & Plan    Heike Musa is a 34 year old female with a past medical history of gestational diabetes, preeclampsia, and migraine, who presents to the ED for evaluation of foot pain.  Upon arrival patient is nontoxic-appearing, afebrile, no distress.  Patient here with right heel pain for the past 2 weeks, denies any trauma or injury.  Patient reports history of similar symptoms in the past.  Patient had x-rays done in triage prior to my evaluation.    I personally reviewed and interpreted x-rays of the left and right foot and calcaneus which are unremarkable with no acute fracture, normal joint spaces and alignment.  I discussed results with patient.  At this time recommend continued supportive care with ice, Tylenol, ibuprofen for pain.  Weightbearing as tolerated.  Recommend supportive shoes and staying off her feet when she is able to.  Recommend close follow-up with her primary care provider or podiatry.  Return precautions discussed.  Patient understands and agrees to plan.    I have reviewed the nursing notes. I have reviewed the findings, diagnosis, plan and need for follow up with the patient.    New Prescriptions    No medications on file       Final diagnoses:   Right foot pain       CATE, Karley Spears, am serving as a trained medical scribe to document services personally performed by Qing JADE  Jer SUN based on the provider's statements to me on November 29, 2024.  This document has been checked and approved by the attending provider.    I, Qing Randle MD, was physically present and have reviewed and verified the accuracy of this note documented by Karley Spears, medical scribe.      Qing Randle MD  [facility] EMERGENCY DEPARTMENT  11/28/2024     Qing Randle MD  11/29/24 0109

## 2024-11-29 NOTE — DISCHARGE INSTRUCTIONS
Please follow-up with your primary care provider in the next 3 to 5 days if you continue to have ongoing symptoms.  Please call in the morning to schedule a follow-up appointment.  A referral has been placed, if no improvement in her symptoms we recommend following up with orthopedics/podiatry.  They will call to schedule an appointment.    Please ice, alternate taking Tylenol and ibuprofen as needed for pain.  You may try over-the-counter lidocaine patch or jelly for comfort.  Please bear weight as tolerated.  Please wear supportive shoes and try to stay off your feet when possible.    Please return to the emergency department if any worsening symptoms.  It was a pleasure taking care of you today.  We hope you feel better soon.

## 2024-11-29 NOTE — ED TRIAGE NOTES
Triage Assessment (Adult)       Row Name 11/28/24 0651          Triage Assessment    Airway WDL WDL        Respiratory WDL    Respiratory WDL WDL        Skin Circulation/Temperature WDL    Skin Circulation/Temperature WDL WDL        Cardiac WDL    Cardiac WDL WDL        Peripheral/Neurovascular WDL    Peripheral Neurovascular WDL WDL        Cognitive/Neuro/Behavioral WDL    Cognitive/Neuro/Behavioral WDL WDL

## 2024-12-02 ENCOUNTER — PATIENT OUTREACH (OUTPATIENT)
Dept: CARE COORDINATION | Facility: CLINIC | Age: 34
End: 2024-12-02
Payer: COMMERCIAL

## 2024-12-18 ENCOUNTER — TRANSCRIBE ORDERS (OUTPATIENT)
Dept: OTHER | Age: 34
End: 2024-12-18

## 2024-12-18 DIAGNOSIS — R10.2 PELVIC PAIN: Primary | ICD-10-CM

## 2024-12-18 DIAGNOSIS — H81.10 BENIGN PAROXYSMAL POSITIONAL VERTIGO, UNSPECIFIED LATERALITY: Primary | ICD-10-CM

## 2024-12-23 ENCOUNTER — OFFICE VISIT (OUTPATIENT)
Dept: OPHTHALMOLOGY | Facility: CLINIC | Age: 34
End: 2024-12-23
Attending: INTERNAL MEDICINE
Payer: COMMERCIAL

## 2024-12-23 ENCOUNTER — VIRTUAL VISIT (OUTPATIENT)
Dept: INTERPRETER SERVICES | Facility: CLINIC | Age: 34
End: 2024-12-23

## 2024-12-23 DIAGNOSIS — R68.89 LIGHT SENSITIVITY: ICD-10-CM

## 2024-12-23 DIAGNOSIS — H57.10 EYE PAIN: ICD-10-CM

## 2024-12-23 DIAGNOSIS — G43.E11 INTRACTABLE CHRONIC MIGRAINE WITH AURA WITH STATUS MIGRAINOSUS: Primary | ICD-10-CM

## 2024-12-23 DIAGNOSIS — H20.9 ANTERIOR UVEITIS: ICD-10-CM

## 2024-12-23 DIAGNOSIS — H10.433 CHRONIC FOLLICULAR CONJUNCTIVITIS OF BOTH EYES: ICD-10-CM

## 2024-12-23 PROBLEM — Z86.32 HISTORY OF GESTATIONAL DIABETES MELLITUS (GDM): Status: ACTIVE | Noted: 2020-02-17

## 2024-12-23 PROBLEM — N90.810 FEMALE CIRCUMCISION: Status: ACTIVE | Noted: 2024-07-17

## 2024-12-23 PROBLEM — R10.13 EPIGASTRIC PAIN: Status: ACTIVE | Noted: 2023-12-07

## 2024-12-23 PROCEDURE — G0463 HOSPITAL OUTPT CLINIC VISIT: HCPCS | Performed by: OPTOMETRIST

## 2024-12-23 PROCEDURE — T1013 SIGN LANG/ORAL INTERPRETER: HCPCS | Mod: U4,TEL,95

## 2024-12-23 RX ORDER — PREDNISOLONE ACETATE 10 MG/ML
1-2 SUSPENSION/ DROPS OPHTHALMIC 4 TIMES DAILY
Qty: 5 ML | Refills: 1 | Status: SHIPPED | OUTPATIENT
Start: 2024-12-23

## 2024-12-23 ASSESSMENT — VISUAL ACUITY
OS_SC: 20/30
OD_SC: 20/40
OS_PH_SC+: -1
METHOD: SNELLEN - LINEAR
OS_PH_SC: 20/20
OD_SC+: -1

## 2024-12-23 ASSESSMENT — SLIT LAMP EXAM - LIDS
COMMENTS: NORMAL
COMMENTS: NORMAL

## 2024-12-23 ASSESSMENT — CONF VISUAL FIELD
OS_SUPERIOR_TEMPORAL_RESTRICTION: 0
OD_INFERIOR_NASAL_RESTRICTION: 0
OD_SUPERIOR_NASAL_RESTRICTION: 0
OS_INFERIOR_TEMPORAL_RESTRICTION: 0
OS_SUPERIOR_NASAL_RESTRICTION: 0
OD_NORMAL: 1
METHOD: COUNTING FINGERS
OD_INFERIOR_TEMPORAL_RESTRICTION: 0
OS_INFERIOR_NASAL_RESTRICTION: 0
OD_SUPERIOR_TEMPORAL_RESTRICTION: 0
OS_NORMAL: 1

## 2024-12-23 ASSESSMENT — EXTERNAL EXAM - RIGHT EYE: OD_EXAM: NORMAL

## 2024-12-23 ASSESSMENT — TONOMETRY
IOP_METHOD: ICARE
OD_IOP_MMHG: 21
OS_IOP_MMHG: 17

## 2024-12-23 ASSESSMENT — CUP TO DISC RATIO
OS_RATIO: 0.25
OD_RATIO: 0.25

## 2024-12-23 ASSESSMENT — EXTERNAL EXAM - LEFT EYE: OS_EXAM: NORMAL
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
## 2024-12-23 NOTE — NURSING NOTE
Chief Complaints and History of Present Illnesses   Patient presents with    Consult For     Pt here for consult on eye pain.     Chief Complaint(s) and History of Present Illness(es)       Consult For              Laterality: both eyes    Comments: Pt here for consult on eye pain.              Comments     ID# [id number]    Pt notes eye pain and light sensitivity. Symptoms have been present for several months. Last eye exam was approximately 2 years. Overall healthy eyes.   Pt states light sensitivity is present in all lighting.     MAYA Koch on 12/23/2024 at 2:01 PM
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
## 2024-12-24 ENCOUNTER — THERAPY VISIT (OUTPATIENT)
Dept: PHYSICAL THERAPY | Facility: CLINIC | Age: 34
End: 2024-12-24
Attending: INTERNAL MEDICINE
Payer: COMMERCIAL

## 2024-12-24 DIAGNOSIS — H81.10 BENIGN PAROXYSMAL POSITIONAL VERTIGO, UNSPECIFIED LATERALITY: Primary | ICD-10-CM

## 2024-12-24 NOTE — PROGRESS NOTES
PHYSICAL THERAPY EVALUATION  Type of Visit: Evaluation       Fall Risk Screen:  Fall screen completed by: PT  Have you fallen 2 or more times in the past year?: No  Have you fallen and had an injury in the past year?: No  Timed Up and Go score (seconds): FGA  Is patient a fall risk?: No    Subjective         Presenting condition or subjective complaint:   Dizziness. She was seen in ED on 24 due to room spinning dizziness x 3 days.    She reports having had dizziness in 2017. She had tried some physical therapy in the past, which helped for a bit, but then it returned. Per patient, no vestibular therapy in the past. Does not think she has migraine headaches, but has been told in the past that the dizziness was vertiginous migraine.   She describes symptoms as a spin. Will sometimes be holding baby and gets dizziness spell. Sometimes feels she cannot stand up because she does not want to drop the baby.   When driving and she looks right-left, it will trigger. Any quick movements of her head trigger dizziness.   While driving she feels she is getting too close to another vehicle/la. Even when parking the car, she does not feel comfortable.   When she sees a car move in her periphery, she feels like she is moving.     Date of onset: 24    Relevant medical history:     Dates & types of surgery:   24    Prior diagnostic imaging/testing results:       Prior therapy history for the same diagnosis, illness or injury:        Prior Level of Function  Transfers: Independent  Ambulation: Independent  ADL: Independent  IADL: Childcare, Housekeeping    Living Environment  Social support:   lives with spouse and kids  Type of home:   apartment  Stairs to enter the home:       no  Ramp:     Stairs inside the home:       no  Help at home:  family  Equipment owned:   none    Employment:      Hobbies/Interests:      Patient goals for therapy:      Pain assessment: Pain denied     Objective      Cognitive  Status Examination  Orientation: Oriented to person, place and time   Level of Consciousness: Alert  Follows Commands and Answers Questions: 100% of the time  Personal Safety and Judgement: Intact  Memory: Intact    OBSERVATION: Patient arrives independently to appointment with   INTEGUMENTARY: Intact  POSTURE:  forward head posture  PALPATION: N/T  RANGE OF MOTION: LE ROM WFL  STRENGTH: LE Strength WFL    BED MOBILITY: Independent    TRANSFERS: Independent    WHEELCHAIR MOBILITY: N/A    GAIT:   Level of [place]: Independent  Assistive Device(s): None  Gait Deviations:  Slow kimberley, occasional veering; path deviations with gait and head movements, occasionally unsteady on turns  Gait Distance: N/T  Stairs: Reciprocal pattern with rail, slowly    BALANCE: Patient demonstrates path deviations/instability with gait and head turns, and also with conditions 2 and 4 of the mCTSIB, indicating difficulty using vestibular input for balance.     SPECIAL TESTS  Functional Gait Assessment (FGA) TOTAL SCORE: (MAXIMUM SCORE 30): 22      Modified CTSIB Conditions (sec) Cond 1: 30  Cond 2: 15  Cond 4: 30  Cond 5 : 9       SENSATION: UE Sensation WNL, LE Sensation WNL    REFLEXES: N/T  COORDINATION: WFL  MUSCLE TONE: WFL       VESTIBULAR EVALUATION  ADDITIONAL HISTORY:  Description of symptoms:   Spinning  Dizzy attacks:   Start:  11/9/2024   Last attack:   today   Frequency of occurrences:   daily   Length of attack:  ~3 minutes  Difficulty hearing:  none  Noise in ears?    no  Alleviates symptoms:   sitting still/not moving head  Worsens symptoms:    Activities that bring on symptoms:  quick head movements, quick turns, visual motion, being passenger in the car       Pertinent visual history: none  Pertinent history of current vestibular problem: Migraines, possibly      Oculomotor Screen    Ocular ROM Normal   Smooth Pursuit Normal--closes eyes due to discomfort   Saccades Normal--closes eyes with vertical due to  discomfort   VOR Normal   VOR Cancellation Normal   Head Impulse Test Abnormal to the right   Convergence Testing Normal        Infrared Goggle Exam Vestibular Suppressant in Last 24 Hours? No  Exam Completed With: Room light   Spontaneous Nystagmus Negative   Gaze Evoked Nystagmus Negative    Left Right   Shirin-Hallpike Negative Negative   HSCC Supine Roll Test Negative Negative       Dynamic Visual Acuity (DVA) Not completed due to language barrier       Assessment & Plan   CLINICAL IMPRESSIONS  Medical Diagnosis: BPPV    Treatment Diagnosis: Sensory selection and weighting deficit   Impression/Assessment: Patient is a 34 year old female with complaints of daily dizziness with quick head movements or visual motion.  The following significant findings have been identified: Dizziness. These impairments interfere with their ability to perform household chores, driving , household mobility, and community mobility as compared to previous level of function.   BPPV testing was negative on exam today. She had LOB and symptoms with gait and head movements, VOR, and conditions 2 and 4 of the mCTSIB, indicating a vestibular dysfunction.     Clinical Decision Making (Complexity):  Clinical Presentation: Evolving/Changing  Clinical Presentation Rationale: based on medical and personal factors listed in PT evaluation  Clinical Decision Making (Complexity): Moderate complexity    PLAN OF CARE  Treatment Interventions:  Interventions: Gait Training, Neuromuscular Re-education, Therapeutic Activity, Therapeutic Exercise, Self-Care/Home Management, Canalith Repositioning, Standardized Testing    Long Term Goals     PT Goal 1  Goal Identifier: HEP  Goal Description: Patient will be independent with Home Exercise Program for continued improvements in health and wellness upon d/c from therapy  Target Date: 02/18/25  PT Goal 2  Goal Identifier: FGA  Goal Description: Patient will improve FGA to 26 or better  Rationale: to maximize safety  and independence with performance of ADLs and functional tasks  Target Date: 02/18/25  PT Goal 3  Goal Identifier: mCTSIB  Goal Description: Patient will improve mCTSIB to 120 seconds  Rationale: to maximize safety and independence with performance of ADLs and functional tasks  Target Date: 02/18/25  PT Goal 4  Goal Identifier: Dizziness  Goal Description: Patient will report no dizziness with head movements/position changes to complete household and driving activities safely.  Target Date: 02/18/25      Frequency of Treatment: 1x/week  Duration of Treatment: 8 weeks    Recommended Referrals to Other Professionals:   Education Assessment:   Learner/Method: Patient;Listening;Reading;Demonstration;Pictures/Video;No Barriers to Learning  Education Comments: Educated on causes for dizziness, PT POC, and inital HEP    Risks and benefits of evaluation/treatment have been explained.   Patient/Family/caregiver agrees with Plan of Care.     Evaluation Time:     PT Eval, Moderate Complexity Minutes (18817): 32  Evaluation Only   Present: Yes: Language: Puerto Rican, ID Number/Identifier:       Signing Clinician: Allyson Stubbs PT        [facility]                                                                                   OUTPATIENT PHYSICAL THERAPY      PLAN OF TREATMENT FOR OUTPATIENT REHABILITATION   Patient's Last Name, First Name, Heike Herman YOB: 1990   Provider's Name   [facility]   Medical Record No.  [id number]     Onset Date: 12/18/24  Start of Care Date: 12/24/24     Medical Diagnosis:  BPPV      PT Treatment Diagnosis:  Sensory selection and weighting deficit Plan of Treatment  Frequency/Duration: 1x/week/ 8 weeks    Certification date from 12/24/24 to 02/18/25         See note for plan of treatment details and functional goals     Allyson Stubbs, PT                         I CERTIFY THE NEED FOR THESE  SERVICES FURNISHED UNDER        THIS PLAN OF TREATMENT AND WHILE UNDER MY CARE .             Physician Signature               Date    X_____________________________________________________                  Referring Provider:  Ganesh Hastings    Initial Assessment  See Epic Evaluation- Start of Care Date: 12/24/24

## 2024-12-30 ENCOUNTER — OFFICE VISIT (OUTPATIENT)
Dept: OPHTHALMOLOGY | Facility: CLINIC | Age: 34
End: 2024-12-30
Attending: OPTOMETRIST
Payer: COMMERCIAL

## 2024-12-30 DIAGNOSIS — H57.13 PAIN OF BOTH EYES: Primary | ICD-10-CM

## 2024-12-30 DIAGNOSIS — H20.9 ANTERIOR UVEITIS: ICD-10-CM

## 2024-12-30 DIAGNOSIS — R68.89 LIGHT SENSITIVITY: ICD-10-CM

## 2024-12-30 DIAGNOSIS — H10.433 CHRONIC FOLLICULAR CONJUNCTIVITIS OF BOTH EYES: ICD-10-CM

## 2024-12-30 PROCEDURE — G0463 HOSPITAL OUTPT CLINIC VISIT: HCPCS | Performed by: OPTOMETRIST

## 2024-12-30 ASSESSMENT — TONOMETRY
OS_IOP_MMHG: 10
OD_IOP_MMHG: 10
IOP_METHOD: ICARE

## 2024-12-30 ASSESSMENT — VISUAL ACUITY
OS_SC+: -2
OD_SC: 20/50
OS_SC: 20/20
METHOD: SNELLEN - LINEAR

## 2024-12-30 ASSESSMENT — SLIT LAMP EXAM - LIDS
COMMENTS: NORMAL
COMMENTS: NORMAL

## 2024-12-30 ASSESSMENT — EXTERNAL EXAM - LEFT EYE: OS_EXAM: NORMAL

## 2024-12-30 ASSESSMENT — EXTERNAL EXAM - RIGHT EYE: OD_EXAM: NORMAL

## 2024-12-30 NOTE — PATIENT INSTRUCTIONS
Eyes doing better with the drops  Still some pressures in the eyes  Eyes still sensitive to lights  No redness  Blurred vision dist and near had glasses in the past      Taper treatment  1 drop 3 x per day 4 days, 1 drop 2 X per day 4 days, 1 drop once per day 1 week    MR needed then  Recheck 10-12 days

## 2024-12-30 NOTE — NURSING NOTE
Chief Complaints and History of Present Illnesses   Patient presents with    Uveitis Follow-Up     Chief Complaint(s) and History of Present Illness(es)       Uveitis Follow-Up              Laterality: both eyes    Onset: 1 week ago              Comments    Pt. States that she is doing well. No longer having any pain BE. No change in VA BE. No flashes or floaters BE.   Sarah Pringle COT 9:50 AM December 30, 2024

## 2025-01-09 ENCOUNTER — OFFICE VISIT (OUTPATIENT)
Dept: OPHTHALMOLOGY | Facility: CLINIC | Age: 35
End: 2025-01-09
Attending: OPTOMETRIST
Payer: COMMERCIAL

## 2025-01-09 DIAGNOSIS — H10.433 CHRONIC FOLLICULAR CONJUNCTIVITIS OF BOTH EYES: ICD-10-CM

## 2025-01-09 DIAGNOSIS — H57.13 PAIN OF BOTH EYES: ICD-10-CM

## 2025-01-09 DIAGNOSIS — R68.89 LIGHT SENSITIVITY: Primary | ICD-10-CM

## 2025-01-09 DIAGNOSIS — H20.9 ANTERIOR UVEITIS: ICD-10-CM

## 2025-01-09 PROCEDURE — G0463 HOSPITAL OUTPT CLINIC VISIT: HCPCS | Performed by: OPTOMETRIST

## 2025-01-09 ASSESSMENT — VISUAL ACUITY
OS_SC+: -1
OS_PH_SC: 20/25
METHOD: SNELLEN - LINEAR
OD_SC: 20/70
OS_SC: J1+
OD_PH_SC+: -2
OD_PH_SC: 20/60
OD_SC: J7
OS_SC: 20/30

## 2025-01-09 ASSESSMENT — REFRACTION_MANIFEST
OS_SPHERE: -0.75
OD_AXIS: 164
OS_AXIS: 039
OS_CYLINDER: +0.75
OD_CYLINDER: +0.50
OD_SPHERE: -1.00

## 2025-01-09 ASSESSMENT — TONOMETRY
OS_IOP_MMHG: 10
IOP_METHOD: ICARE
OD_IOP_MMHG: 13

## 2025-01-09 ASSESSMENT — SLIT LAMP EXAM - LIDS
COMMENTS: NORMAL
COMMENTS: NORMAL

## 2025-01-09 ASSESSMENT — EXTERNAL EXAM - RIGHT EYE: OD_EXAM: NORMAL

## 2025-01-09 ASSESSMENT — EXTERNAL EXAM - LEFT EYE: OS_EXAM: NORMAL

## 2025-01-09 NOTE — NURSING NOTE
"Chief Complaints and History of Present Illnesses   Patient presents with    Eye Pain Both Eyes     Follow up     Chief Complaint(s) and History of Present Illness(es)       Eye Pain Both Eyes              Comments: Follow up              Comments    Interpreting services present for exam today, patient prefers to have  present. Difficulties with getting interpreting services connected, spending extra time with this.    Patient communicates no ocular pain currently either eye but more of a \"heaviness\" of right eye, like \"something is on it.\" No redness front of eyes. Extreme photosensitivity. No blurriness noticed with either eye. No floaters noticed, no flashes of light. No DM.    Ocular medications:  Prednisolone four times daily both eyes    Donna Duke on 1/9/2025 at 11:05 AM                     "

## 2025-01-09 NOTE — PATIENT INSTRUCTIONS
Eyes feel better with the drops  Still some light sensitivity but better  Wants to know why for the last 5 years the vision has changed    Taper treatment  1 drop 3 x per day 7 days, 1 drop 2 X per day 7 days, 1 drop once per day 1 week    Full exam 3 weeks Mac OCT and RFNL

## 2025-01-09 NOTE — PROGRESS NOTES
Assessment & Plan       Heike Musa is a 35 year old female with the following diagnoses:   1. Light sensitivity - Both Eyes    2. Anterior uveitis - Both Eyes    3. Pain of both eyes - Both Eyes    4. Chronic follicular conjunctivitis of both eyes - Both Eyes          Eyes feel better with the drops  Still some light sensitivity but better  Wants to know why for the last 5 years the vision has changed    Taper treatment  1 drop 3 x per day 7 days, 1 drop 2 X per day 7 days, 1 drop once per day 1 week    Full exam 3 weeks     Patient disposition:   No follow-ups on file.          Complete documentation of historical and exam elements from today's encounter can be found in the full encounter summary report (not reduplicated in this progress note). I personally obtained the chief complaint(s) and history of present illness.  I confirmed and edited as necessary the review of systems, past medical/surgical history, family history, social history, and examination findings as documented by others; and I examined the patient myself. I personally reviewed the relevant tests, images, and reports as documented above. I formulated and edited as necessary the assessment and plan and discussed the findings and management plan with the patient and family.  Dr. Brandon Mayorga

## 2025-01-12 ENCOUNTER — HEALTH MAINTENANCE LETTER (OUTPATIENT)
Age: 35
End: 2025-01-12

## 2025-01-27 ENCOUNTER — VIRTUAL VISIT (OUTPATIENT)
Dept: INTERPRETER SERVICES | Facility: CLINIC | Age: 35
End: 2025-01-27

## 2025-01-27 ENCOUNTER — THERAPY VISIT (OUTPATIENT)
Dept: PHYSICAL THERAPY | Facility: CLINIC | Age: 35
End: 2025-01-27
Payer: COMMERCIAL

## 2025-01-27 DIAGNOSIS — H81.10 BENIGN PAROXYSMAL POSITIONAL VERTIGO, UNSPECIFIED LATERALITY: Primary | ICD-10-CM

## 2025-01-27 NOTE — PROGRESS NOTES
Functional Gait Assessment (FGA): The FGA assesses postural stability during various walking tasks.     Gait assistive device used: None     Scores of <22 /30 have been correlated with predicting falls in community-dwelling older adults according to Parul & Juan 2010.   Scores of <18 /30 have been correlated with increased risk for falls in patients with Parkinsons Disease according to Davie Akbar Zhou et al 2014.  Minimal Detectable Change for patients with acute/chronic stroke = 4.2 according to Chandler & Isamar 2009  Minimal Detectable Change for patients with vestibular disorder = 8 according to Parul & Juan 2010     Assessment (rationale for performing, application to patient s function & care plan): Performed to assess balance with gait. Scored at _28/30_. This is a significant improvement from 22/30 at initial evaluation.   (Minutes billed as physical performance test): 10    Modified Clinical Test of Sensory Interaction in Balance (mCTSIB):The mCTSIB provides a gross measure of the ability to use somatosensory, visual, and vestibular feedback for postural control. The test was performed with shoes on. The compliant surface used was Airex Pad    Overall findings:     Condition Time Sway Characteristics   Non-Compliant Surface with Eyes Open  30 WFL   Non-Compliant Surface with Eyes Closed 30 WFL   Compliant Surface with Eyes Open 30 WFL   Compliant Surface with Eyes Closed 30 WFL   Sensory Analysis: Overall the findings indicate good multisensory integration for balance.    Assessment (rationale for performing, application to patient s function & care plan): The mCTSIB was completed to assess static balance and use of sensory systems for balance. She was able to maintain balance on all conditions, indicating good use of visual, vestibular, and somatosensory systems.   Minutes billed as physical performance test: 5

## 2025-03-10 ENCOUNTER — THERAPY VISIT (OUTPATIENT)
Dept: PHYSICAL THERAPY | Facility: CLINIC | Age: 35
End: 2025-03-10
Attending: INTERNAL MEDICINE
Payer: COMMERCIAL

## 2025-03-10 DIAGNOSIS — R10.2 PELVIC PAIN IN FEMALE: ICD-10-CM

## 2025-03-10 DIAGNOSIS — M62.08 DIASTASIS RECTI: Primary | ICD-10-CM

## 2025-03-10 PROCEDURE — 97110 THERAPEUTIC EXERCISES: CPT | Mod: GP

## 2025-03-10 PROCEDURE — 97530 THERAPEUTIC ACTIVITIES: CPT | Mod: GP

## 2025-03-10 PROCEDURE — 97161 PT EVAL LOW COMPLEX 20 MIN: CPT | Mod: GP

## 2025-03-10 NOTE — PROGRESS NOTES
PHYSICAL THERAPY EVALUATION  Type of Visit: Evaluation              Subjective   Patient reports that she has some pain with her scars from her . Feels pain when it gets pressed on and when she is touching it or reaching for items. When she holds the baby she gets pain in the lower abdominal region. She just gets pain in the region with the scar tissue and denies other pelvic pain. No issues with urination and not having urinary leakage. Fasting right now so about twice a day for urination and doing 3-4 times when not on Ramadan. Having bowel movements twice per day. Has soft-formed bowel movements and doesn't feel that she has to strain. No pain with sexual activity. No issues with the delivery. Sometimes when she cooks for a long time she feels back pain. Didn't have back pain before or during the pregnancy, feels as though her back pain could be coming from the epidural site.      Presenting condition or subjective complaint: pelvic pain and c section area has scar tisue  Date of onset: 24 (Date of orders)    Relevant medical history:     Dates & types of surgery: c section    Prior diagnostic imaging/testing results:       Prior therapy history for the same diagnosis, illness or injury: Yes headache    Prior Level of Function  Transfers: Independent  Ambulation: Independent  ADL: Independent    Living Environment  Social support: With family members   Type of home: Apartment/condo   Stairs to enter the home: No       Ramp: No   Stairs inside the home: No       Help at home: None  Equipment owned:       Employment: Yes PCA  Hobbies/Interests: walking    Patient goals for therapy: reaching some thing    Pain assessment: See objective evaluation for additional pain details     Objective      PELVIC EVALUATION  ADDITIONAL HISTORY:  Sex assigned at birth: Female  Gender identity: Female    Pronouns: She/Her Hers      Bladder History:  Feels bladder filling: No  Triggers for feeling of inability to wait  to go to the bathroom: No    How long can you wait to urinate: N/A  Gets up at night to urinate: No    Can stop the flow of urine when urinating: Yes  Volume of urine usually released: Medium   Other issues:    Number of bladder infections in last 12 months:    Fluid intake per day: 1500ml      Medications taken for bladder: No     Activities causing urine leak:      Amount of urine typically leaked: no leak  Pads used to help with leaking: No        Bowel History:  Frequency of bowel movement: one time a day  Consistency of stool: Soft    Ignores the urge to defecate: No  Other bowel issues: Loss of stool  Length of time spent trying to have a bowel movement:      Sexual Function History:  Sexual orientation: Straight    Sexually active: Yes  Lubrication used: No No  Pelvic pain:      Pain or difficulty with orgasms/erection/ejaculation: No    State of menopause:    Hormone medications: No      Are you currently pregnant: No  Number of previous pregnancies: 2  Number of deliveries: 1  If you have delivered before, did you have any of these issues during delivery:  delivery  Have you been diagnosed with pelvic prolapse or abdominal separation: No  Do you get regular exercise: No  Have you tried pelvic floor strengthening exercises for 4 weeks: No  Do you have any history of trauma that is relevant to your care that you d like to share: No      Discussed reason for referral regarding pelvic health needs and external/internal pelvic floor muscle examination with patient/guardian.  Opportunity provided to ask questions and verbal consent for assessment and intervention was given.    PAIN: Pain localized to  scars and lower back.  POSTURE: Sitting Posture: Rounded shoulders, Forward head  LUMBAR SCREEN:   (Degrees) Left AROM Left PROM  Right AROM Right PROM   Hip Flexion  WFL  WFL   Hip Extension       Hip Abduction       Hip Adduction       Hip Internal Rotation  WFL  WFL   Hip External Rotation  WFL   WFL   Knee Flexion       Knee Extension       Lumbar Side glide To mid thigh To mid thigh, mild pain   Lumbar Flexion To shins, pain with returning to standing   Lumbar Extension Limited, pain-free   Thoracic mobility:  Pain with forward flexion  Limitation with thoracic extension  Pain and limitation with L rotation  WFL for   HIP SCREEN:  Strength: WFL ; hip abduction 4+/5 bilaterally  Functional Strength Testing:     PELVIC/SI SCREEN:    PAIN PROVOCATION TEST:   PELVIS/SI SPECIAL TESTS:   BREATHING SYMMETRY:     PELVIC EXAM  External Visual Inspection:    Integumentary:       External Digital Palpation per Perineum:       Scar:   Location/Type:  Mobility:     Internal Digital Palpation:  Per Vagina:      Per Rectum:        Pelvic Organ Prolapse:       ABDOMINAL ASSESSMENT  Diastasis Rectus Abdominis (ELLIOT):  ELLIOT presence: Yes   Above Umbilicus Depth/Finger width: 2 finger widths   At Umbilicus Depth/Finger width: 2 finger widths   Below Umbilicus Depth/Finger width: 1 finger width    Abdominal Activation/Strength:     Scar:   Location/Type: Lower abdomen  Mobility: Hypomobile, Pain    Fascial Tension/Restriction: Hypomobile, Pain      Assessment & Plan   CLINICAL IMPRESSIONS  Medical Diagnosis: Pelvic pain    Treatment Diagnosis: Diastasis rectus abdominis, lower abdominal pain   Impression/Assessment: Patient is a 35 year old female with lower abdominal complaints.  The following significant findings have been identified: Pain, Decreased ROM/flexibility, and Decreased strength. These impairments interfere with their ability to perform self care tasks and household chores as compared to previous level of function.     Clinical Decision Making (Complexity):  Clinical Presentation: Stable/Uncomplicated  Clinical Presentation Rationale: based on medical and personal factors listed in PT evaluation  Clinical Decision Making (Complexity): Low complexity    PLAN OF CARE  Treatment Interventions:  Modalities:  Cryotherapy, Hot Pack  Interventions: Manual Therapy, Neuromuscular Re-education, Therapeutic Activity, Therapeutic Exercise, Self-Care/Home Management    Long Term Goals     PT Goal 1  Goal Identifier: reaching  Goal Description: In 12 weeks, patient will be able to reach for objects with 0/10 pain from lower abdominal scarring.  Rationale: to maximize safety and independence with performance of ADLs and functional tasks  Target Date: 06/02/25      Frequency of Treatment: 1x/every other week  Duration of Treatment: 12 weeks    Recommended Referrals to Other Professionals:  No referrals needed at this time.  Education Assessment:   Learner/Method: Patient    Risks and benefits of evaluation/treatment have been explained.   Patient/Family/caregiver agrees with Plan of Care.     Evaluation Time:     PT Eval, Low Complexity Minutes (87001): 20  Evaluation Only     Signing Clinician: Olesya Lenz PT    Initial Assessment  See Epic Evaluation- Start of Care Date: 03/10/25            [facility]                                                                                   OUTPATIENT PHYSICAL THERAPY    PLAN OF TREATMENT FOR OUTPATIENT REHABILITATION   Patient's Last Name, First Name, Heike Herman YOB: 1990   Provider's Name   [facility]   Medical Record No.  [id number]     Onset Date: 12/18/24 (Date of orders)  Start of Care Date: 03/10/25     Medical Diagnosis:  Pelvic pain      PT Treatment Diagnosis:  Diastasis rectus abdominis, lower abdominal pain Plan of Treatment  Frequency/Duration: 1x/every other week/ 12 weeks    Certification date from 03/10/25 to 06/02/25         See note for plan of treatment details and functional goals     Olesya Lenz, PT                         I CERTIFY THE NEED FOR THESE SERVICES FURNISHED UNDER        THIS PLAN OF TREATMENT AND WHILE UNDER MY CARE .             Physician Signature                Date    X_____________________________________________________                  Referring Provider:  Ganesh Hastings    Initial Assessment  See Epic Evaluation- Start of Care Date: 03/10/25

## 2025-03-11 ENCOUNTER — MEDICAL CORRESPONDENCE (OUTPATIENT)
Dept: HEALTH INFORMATION MANAGEMENT | Facility: CLINIC | Age: 35
End: 2025-03-11

## 2025-04-08 ENCOUNTER — THERAPY VISIT (OUTPATIENT)
Dept: PHYSICAL THERAPY | Facility: CLINIC | Age: 35
End: 2025-04-08
Payer: COMMERCIAL

## 2025-04-08 DIAGNOSIS — R10.2 PELVIC PAIN IN FEMALE: ICD-10-CM

## 2025-04-08 DIAGNOSIS — M62.08 DIASTASIS RECTI: Primary | ICD-10-CM

## 2025-04-08 PROCEDURE — 97530 THERAPEUTIC ACTIVITIES: CPT | Mod: GP | Performed by: PHYSICAL THERAPIST

## 2025-04-08 PROCEDURE — 97110 THERAPEUTIC EXERCISES: CPT | Mod: GP | Performed by: PHYSICAL THERAPIST

## 2025-04-08 PROCEDURE — 97140 MANUAL THERAPY 1/> REGIONS: CPT | Mod: GP | Performed by: PHYSICAL THERAPIST

## 2025-04-17 ENCOUNTER — LAB REQUISITION (OUTPATIENT)
Dept: LAB | Facility: CLINIC | Age: 35
End: 2025-04-17
Payer: COMMERCIAL

## 2025-04-17 DIAGNOSIS — R30.0 DYSURIA: ICD-10-CM

## 2025-04-17 PROCEDURE — 87086 URINE CULTURE/COLONY COUNT: CPT | Mod: ORL | Performed by: INTERNAL MEDICINE

## 2025-04-19 LAB — BACTERIA UR CULT: NORMAL

## 2025-05-08 ENCOUNTER — HOSPITAL ENCOUNTER (EMERGENCY)
Facility: CLINIC | Age: 35
Discharge: HOME | End: 2025-05-09
Attending: STUDENT IN AN ORGANIZED HEALTH CARE EDUCATION/TRAINING PROGRAM
Payer: COMMERCIAL

## 2025-05-08 DIAGNOSIS — R42 DIZZINESS: ICD-10-CM

## 2025-05-08 DIAGNOSIS — R11.0 NAUSEA: ICD-10-CM

## 2025-05-08 LAB
ALBUMIN SERPL BCG-MCNC: 4 G/DL (ref 3.5–5.2)
ALBUMIN UR-MCNC: NEGATIVE MG/DL
ALP SERPL-CCNC: 96 U/L (ref 40–150)
ALT SERPL W P-5'-P-CCNC: 18 U/L (ref 0–50)
ANION GAP SERPL CALCULATED.3IONS-SCNC: 8 MMOL/L (ref 7–15)
APPEARANCE UR: CLEAR
AST SERPL W P-5'-P-CCNC: 17 U/L (ref 0–45)
BACTERIA #/AREA URNS HPF: ABNORMAL /HPF
BASOPHILS # BLD AUTO: 0 10E3/UL (ref 0–0.2)
BASOPHILS NFR BLD AUTO: 0 %
BILIRUB SERPL-MCNC: 0.4 MG/DL
BILIRUB UR QL STRIP: NEGATIVE
BUN SERPL-MCNC: 7.9 MG/DL (ref 6–20)
CALCIUM SERPL-MCNC: 9.2 MG/DL (ref 8.8–10.4)
CHLORIDE SERPL-SCNC: 106 MMOL/L (ref 98–107)
COLOR UR AUTO: ABNORMAL
CREAT SERPL-MCNC: 0.65 MG/DL (ref 0.51–0.95)
EGFRCR SERPLBLD CKD-EPI 2021: >90 ML/MIN/1.73M2
EOSINOPHIL # BLD AUTO: 0.1 10E3/UL (ref 0–0.7)
EOSINOPHIL NFR BLD AUTO: 1 %
ERYTHROCYTE [DISTWIDTH] IN BLOOD BY AUTOMATED COUNT: 11.9 % (ref 10–15)
GLUCOSE SERPL-MCNC: 114 MG/DL (ref 70–99)
GLUCOSE UR STRIP-MCNC: NEGATIVE MG/DL
HCG SERPL QL: NEGATIVE
HCO3 SERPL-SCNC: 25 MMOL/L (ref 22–29)
HCT VFR BLD AUTO: 39.4 % (ref 35–47)
HGB BLD-MCNC: 13.1 G/DL (ref 11.7–15.7)
HGB UR QL STRIP: NEGATIVE
IMM GRANULOCYTES # BLD: 0 10E3/UL
IMM GRANULOCYTES NFR BLD: 0 %
KETONES UR STRIP-MCNC: NEGATIVE MG/DL
LEUKOCYTE ESTERASE UR QL STRIP: NEGATIVE
LYMPHOCYTES # BLD AUTO: 2.5 10E3/UL (ref 0.8–5.3)
LYMPHOCYTES NFR BLD AUTO: 27 %
MCH RBC QN AUTO: 29.1 PG (ref 26.5–33)
MCHC RBC AUTO-ENTMCNC: 33.2 G/DL (ref 31.5–36.5)
MCV RBC AUTO: 88 FL (ref 78–100)
MONOCYTES # BLD AUTO: 0.9 10E3/UL (ref 0–1.3)
MONOCYTES NFR BLD AUTO: 9 %
NEUTROPHILS # BLD AUTO: 6 10E3/UL (ref 1.6–8.3)
NEUTROPHILS NFR BLD AUTO: 63 %
NITRATE UR QL: NEGATIVE
NRBC # BLD AUTO: 0 10E3/UL
NRBC BLD AUTO-RTO: 0 /100
PH UR STRIP: 5 [PH] (ref 5–7)
PLATELET # BLD AUTO: 282 10E3/UL (ref 150–450)
POTASSIUM SERPL-SCNC: 4.5 MMOL/L (ref 3.4–5.3)
PROT SERPL-MCNC: 7.1 G/DL (ref 6.4–8.3)
RBC # BLD AUTO: 4.5 10E6/UL (ref 3.8–5.2)
RBC URINE: <1 /HPF
SODIUM SERPL-SCNC: 139 MMOL/L (ref 135–145)
SP GR UR STRIP: 1.01 (ref 1–1.03)
SQUAMOUS EPITHELIAL: 9 /HPF
UROBILINOGEN UR STRIP-MCNC: NORMAL MG/DL
WBC # BLD AUTO: 9.4 10E3/UL (ref 4–11)
WBC URINE: 2 /HPF

## 2025-05-08 PROCEDURE — 36415 COLL VENOUS BLD VENIPUNCTURE: CPT | Performed by: STUDENT IN AN ORGANIZED HEALTH CARE EDUCATION/TRAINING PROGRAM

## 2025-05-08 PROCEDURE — 85025 COMPLETE CBC W/AUTO DIFF WBC: CPT | Performed by: STUDENT IN AN ORGANIZED HEALTH CARE EDUCATION/TRAINING PROGRAM

## 2025-05-08 PROCEDURE — 93010 ELECTROCARDIOGRAM REPORT: CPT | Performed by: STUDENT IN AN ORGANIZED HEALTH CARE EDUCATION/TRAINING PROGRAM

## 2025-05-08 PROCEDURE — 96374 THER/PROPH/DIAG INJ IV PUSH: CPT | Performed by: STUDENT IN AN ORGANIZED HEALTH CARE EDUCATION/TRAINING PROGRAM

## 2025-05-08 PROCEDURE — 250N000011 HC RX IP 250 OP 636: Mod: JZ | Performed by: STUDENT IN AN ORGANIZED HEALTH CARE EDUCATION/TRAINING PROGRAM

## 2025-05-08 PROCEDURE — 81001 URINALYSIS AUTO W/SCOPE: CPT | Performed by: STUDENT IN AN ORGANIZED HEALTH CARE EDUCATION/TRAINING PROGRAM

## 2025-05-08 PROCEDURE — 99284 EMERGENCY DEPT VISIT MOD MDM: CPT | Mod: 25 | Performed by: STUDENT IN AN ORGANIZED HEALTH CARE EDUCATION/TRAINING PROGRAM

## 2025-05-08 PROCEDURE — 258N000003 HC RX IP 258 OP 636: Performed by: STUDENT IN AN ORGANIZED HEALTH CARE EDUCATION/TRAINING PROGRAM

## 2025-05-08 PROCEDURE — 82565 ASSAY OF CREATININE: CPT | Performed by: STUDENT IN AN ORGANIZED HEALTH CARE EDUCATION/TRAINING PROGRAM

## 2025-05-08 PROCEDURE — 96361 HYDRATE IV INFUSION ADD-ON: CPT | Performed by: STUDENT IN AN ORGANIZED HEALTH CARE EDUCATION/TRAINING PROGRAM

## 2025-05-08 PROCEDURE — 99284 EMERGENCY DEPT VISIT MOD MDM: CPT | Performed by: STUDENT IN AN ORGANIZED HEALTH CARE EDUCATION/TRAINING PROGRAM

## 2025-05-08 PROCEDURE — 93005 ELECTROCARDIOGRAM TRACING: CPT | Performed by: STUDENT IN AN ORGANIZED HEALTH CARE EDUCATION/TRAINING PROGRAM

## 2025-05-08 PROCEDURE — 84703 CHORIONIC GONADOTROPIN ASSAY: CPT | Performed by: STUDENT IN AN ORGANIZED HEALTH CARE EDUCATION/TRAINING PROGRAM

## 2025-05-08 PROCEDURE — 250N000013 HC RX MED GY IP 250 OP 250 PS 637: Performed by: STUDENT IN AN ORGANIZED HEALTH CARE EDUCATION/TRAINING PROGRAM

## 2025-05-08 RX ORDER — ACETAMINOPHEN 500 MG
1000 TABLET ORAL ONCE
Status: COMPLETED | OUTPATIENT
Start: 2025-05-08 | End: 2025-05-08

## 2025-05-08 RX ORDER — KETOROLAC TROMETHAMINE 15 MG/ML
15 INJECTION, SOLUTION INTRAMUSCULAR; INTRAVENOUS ONCE
Status: COMPLETED | OUTPATIENT
Start: 2025-05-08 | End: 2025-05-08

## 2025-05-08 RX ADMIN — KETOROLAC TROMETHAMINE 15 MG: 15 INJECTION, SOLUTION INTRAMUSCULAR; INTRAVENOUS at 23:36

## 2025-05-08 RX ADMIN — ACETAMINOPHEN 1000 MG: 500 TABLET ORAL at 23:35

## 2025-05-08 RX ADMIN — SODIUM CHLORIDE 500 ML: 0.9 INJECTION, SOLUTION INTRAVENOUS at 23:35

## 2025-05-08 ASSESSMENT — COLUMBIA-SUICIDE SEVERITY RATING SCALE - C-SSRS
6. HAVE YOU EVER DONE ANYTHING, STARTED TO DO ANYTHING, OR PREPARED TO DO ANYTHING TO END YOUR LIFE?: NO
2. HAVE YOU ACTUALLY HAD ANY THOUGHTS OF KILLING YOURSELF IN THE PAST MONTH?: NO
1. IN THE PAST MONTH, HAVE YOU WISHED YOU WERE DEAD OR WISHED YOU COULD GO TO SLEEP AND NOT WAKE UP?: NO

## 2025-05-08 ASSESSMENT — ACTIVITIES OF DAILY LIVING (ADL): ADLS_ACUITY_SCORE: 57

## 2025-05-09 VITALS
WEIGHT: 174.9 LBS | SYSTOLIC BLOOD PRESSURE: 97 MMHG | DIASTOLIC BLOOD PRESSURE: 56 MMHG | TEMPERATURE: 98.3 F | BODY MASS INDEX: 28.11 KG/M2 | RESPIRATION RATE: 16 BRPM | OXYGEN SATURATION: 100 % | HEART RATE: 62 BPM | HEIGHT: 66 IN

## 2025-05-09 LAB
ATRIAL RATE - MUSE: 55 BPM
DIASTOLIC BLOOD PRESSURE - MUSE: NORMAL MMHG
INTERPRETATION ECG - MUSE: NORMAL
P AXIS - MUSE: 36 DEGREES
PR INTERVAL - MUSE: 186 MS
QRS DURATION - MUSE: 88 MS
QT - MUSE: 416 MS
QTC - MUSE: 397 MS
R AXIS - MUSE: 5 DEGREES
SYSTOLIC BLOOD PRESSURE - MUSE: NORMAL MMHG
T AXIS - MUSE: -2 DEGREES
VENTRICULAR RATE- MUSE: 55 BPM

## 2025-05-09 NOTE — ED TRIAGE NOTES
Nausea and dizziness x1wk, shakiness. Pt states when she closes her eyes she sees lights, pt states she has had the same symptoms before and diagnosis with dehydration      Triage Assessment (Adult)       Row Name 05/08/25 [id number]          Triage Assessment    Airway WDL WDL        Respiratory WDL    Respiratory WDL WDL        Cardiac WDL    Cardiac WDL WDL        Peripheral/Neurovascular WDL    Peripheral Neurovascular WDL WDL        Cognitive/Neuro/Behavioral WDL    Cognitive/Neuro/Behavioral WDL WDL

## 2025-05-09 NOTE — DISCHARGE INSTRUCTIONS
Please return to the emergency department if you develop any new or worsening symptoms  Please follow-up with a primary care appointment that we are setting up with  Today in the emergency department your workup was reassuring.

## 2025-05-09 NOTE — ED PROVIDER NOTES
ED Provider Note  [facility] EMERGENCY DEPARTMENT ([facility])    25       ED PROVIDER NOTE     History     Chief Complaint   Patient presents with    Nausea     Nausea and dizziness x1wk, shakiness. Pt states when she closes her eyes she sees lights, pt states she has had the same symptoms before and diagnosis with dehydration      HPI  Heike Musa is a 35 year old female with a notable history of severe preeclampsia, gestational diabetes, and migraines who presents to the ED for evaluation of nausea. Patient reports dizziness, headaches, nausea, and fatigue x1 week. She denies dysuria or vaginal discharge. Denies taking any medications. She does have a primary provider. She states that her headache is currently better than it was before.         Past Medical History  Past Medical History:   Diagnosis Date    Migraines      Past Surgical History:   Procedure Laterality Date     SECTION N/A 2024    Procedure:  section;  Surgeon: Delores Wooten MD;  Location: [facility]     acetaminophen (TYLENOL) 325 MG tablet  benzocaine-menthol (DERMOPLAST) 20-0.5 % AERO  bisacodyl (DULCOLAX) 10 MG suppository  Cholecalciferol (VITAMIN D3) 50 MCG ( UT) CAPS  dibucaine 1 % OINT rectal ointment  diclofenac (VOLTAREN) 1 % topical gel  docusate sodium (COLACE) 100 MG capsule  doxylamine (UNISOM) 25 MG TABS tablet  hydrocortisone (CORTAID) 1 % external cream  ibuprofen (ADVIL/MOTRIN) 800 MG tablet  ibuprofen (ADVIL/MOTRIN) 800 MG tablet  meclizine (ANTIVERT) 25 MG tablet  NIFEdipine ER OSMOTIC (ADALAT CC) 30 MG 24 hr tablet  NIFEdipine ER OSMOTIC (ADALAT CC) 60 MG 24 hr tablet  phenylephrine-shark liver oil-mineral oil-petrolatum (PREPARATION H) 0.25-14-74.9 % rectal ointment  prednisoLONE acetate (PRED FORTE) 1 % ophthalmic suspension  Prenatal Vit-Fe Fumarate-FA (PRENATAL MULTIVITAMIN W/IRON) 27-0.8 MG tablet  psyllium (METAMUCIL SMOOTH TEXTURE)  "28 % packet  witch hazel-glycerin (TUCKS) pad      No Known Allergies  Family History  Family History   Problem Relation Age of Onset    Hypertension Mother     Glaucoma No family hx of     Macular Degeneration No family hx of      Social History   Social History     Tobacco Use    Smoking status: Never    Smokeless tobacco: Never   Substance Use Topics    Alcohol use: No    Drug use: No      A medically appropriate review of systems was performed with pertinent positives and negatives noted in the HPI, and all other systems negative.    Physical Exam   BP: (!) 148/87  Pulse: 73  Temp: 98.3  F (36.8  C)  Resp: 16  Height: 167.6 cm (5' 6\")  Weight: 79.3 kg (174 lb 14.4 oz)  SpO2: 100 %  Physical Exam  Constitutional:       General: She is not in acute distress.     Appearance: Normal appearance. She is not diaphoretic.   HENT:      Head: Atraumatic.      Mouth/Throat:      Mouth: Mucous membranes are moist.   Eyes:      General: No scleral icterus.     Conjunctiva/sclera: Conjunctivae normal.   Cardiovascular:      Rate and Rhythm: Normal rate.      Heart sounds: Normal heart sounds. No murmur heard.  Pulmonary:      Effort: No respiratory distress.      Breath sounds: Normal breath sounds. No wheezing.   Abdominal:      General: Abdomen is flat. There is no distension.      Palpations: There is no mass.      Tenderness: There is no abdominal tenderness. There is no right CVA tenderness, left CVA tenderness, guarding or rebound.      Hernia: No hernia is present.   Musculoskeletal:      Cervical back: Neck supple.   Skin:     General: Skin is warm.      Findings: No rash.   Neurological:      Mental Status: She is alert.           ED Course, Procedures, & Data      Procedures       {ED Course Selections (Optional):328100}  {ED Sepsis CMS Documentation (Optional):123683::\" \"}       No results found for any visits on 05/08/25.  Medications - No data to display  Labs Ordered and Resulted from Time of ED Arrival to Time " of ED Departure - No data to display  No orders to display          Critical care was not performed.     Medical Decision Making  The patient's presentation was of moderate complexity (an undiagnosed new problem with uncertain prognosis).    The patient's evaluation involved:  {[facility] Data:557089}    The patient's management necessitated {[facility] Management:870880}.    Assessment & Plan    ***    I have reviewed the nursing notes. I have reviewed the findings, diagnosis, plan and need for follow up with the patient.    New Prescriptions    No medications on file       Final diagnoses:   None   I, Dontrell Aiken, am serving as a trained medical scribe to document services personally performed by Collin Andre MD, based on the provider's statements to me.     ICollni MD, was physically present and have reviewed and verified the accuracy of this note documented by Dontrell Aiken.     Collin Andre MD  [facility] EMERGENCY DEPARTMENT  5/8/2025   patient.    New Prescriptions    No medications on file       Final diagnoses:   None   IDontrell, am serving as a trained medical scribe to document services personally performed by Collin Andre MD, based on the provider's statements to me.     Collin ESPOSITO MD, was physically present and have reviewed and verified the accuracy of this note documented by Dontrell Aiken.     Collin Andre MD  [facility] EMERGENCY DEPARTMENT  5/8/2025     Collin Andre MD  05/09/25 0015       Collin Andre MD  05/09/25 0043

## 2025-06-21 ENCOUNTER — HOSPITAL ENCOUNTER (EMERGENCY)
Facility: CLINIC | Age: 35
Discharge: HOME | End: 2025-06-21
Attending: EMERGENCY MEDICINE | Admitting: EMERGENCY MEDICINE
Payer: COMMERCIAL

## 2025-06-21 ENCOUNTER — APPOINTMENT (OUTPATIENT)
Dept: ULTRASOUND IMAGING | Facility: CLINIC | Age: 35
End: 2025-06-21
Attending: EMERGENCY MEDICINE
Payer: COMMERCIAL

## 2025-06-21 VITALS
DIASTOLIC BLOOD PRESSURE: 77 MMHG | RESPIRATION RATE: 16 BRPM | OXYGEN SATURATION: 98 % | HEART RATE: 84 BPM | TEMPERATURE: 96.9 F | SYSTOLIC BLOOD PRESSURE: 121 MMHG

## 2025-06-21 DIAGNOSIS — O21.0 MORNING SICKNESS: ICD-10-CM

## 2025-06-21 LAB
ALBUMIN SERPL BCG-MCNC: 3.8 G/DL (ref 3.5–5.2)
ALP SERPL-CCNC: 83 U/L (ref 40–150)
ALT SERPL W P-5'-P-CCNC: 12 U/L (ref 0–50)
ANION GAP SERPL CALCULATED.3IONS-SCNC: 12 MMOL/L (ref 7–15)
AST SERPL W P-5'-P-CCNC: 18 U/L (ref 0–45)
BASOPHILS # BLD AUTO: 0 10E3/UL (ref 0–0.2)
BASOPHILS NFR BLD AUTO: 0 %
BILIRUB SERPL-MCNC: 0.4 MG/DL
BUN SERPL-MCNC: 7.9 MG/DL (ref 6–20)
CALCIUM SERPL-MCNC: 8.8 MG/DL (ref 8.8–10.4)
CHLORIDE SERPL-SCNC: 99 MMOL/L (ref 98–107)
CREAT SERPL-MCNC: 0.41 MG/DL (ref 0.51–0.95)
EGFRCR SERPLBLD CKD-EPI 2021: >90 ML/MIN/1.73M2
EOSINOPHIL # BLD AUTO: 0 10E3/UL (ref 0–0.7)
EOSINOPHIL NFR BLD AUTO: 0 %
ERYTHROCYTE [DISTWIDTH] IN BLOOD BY AUTOMATED COUNT: 12.5 % (ref 10–15)
GLUCOSE SERPL-MCNC: 111 MG/DL (ref 70–99)
HCG INTACT+B SERPL-ACNC: ABNORMAL MIU/ML
HCO3 SERPL-SCNC: 21 MMOL/L (ref 22–29)
HCT VFR BLD AUTO: 39.8 % (ref 35–47)
HGB BLD-MCNC: 13.3 G/DL (ref 11.7–15.7)
IMM GRANULOCYTES # BLD: 0.1 10E3/UL
IMM GRANULOCYTES NFR BLD: 1 %
LIPASE SERPL-CCNC: 33 U/L (ref 13–60)
LYMPHOCYTES # BLD AUTO: 2.1 10E3/UL (ref 0.8–5.3)
LYMPHOCYTES NFR BLD AUTO: 14 %
MAGNESIUM SERPL-MCNC: 2 MG/DL (ref 1.7–2.3)
MCH RBC QN AUTO: 29.2 PG (ref 26.5–33)
MCHC RBC AUTO-ENTMCNC: 33.4 G/DL (ref 31.5–36.5)
MCV RBC AUTO: 87 FL (ref 78–100)
MONOCYTES # BLD AUTO: 1 10E3/UL (ref 0–1.3)
MONOCYTES NFR BLD AUTO: 7 %
NEUTROPHILS # BLD AUTO: 11 10E3/UL (ref 1.6–8.3)
NEUTROPHILS NFR BLD AUTO: 78 %
NRBC # BLD AUTO: 0 10E3/UL
NRBC BLD AUTO-RTO: 0 /100
PLATELET # BLD AUTO: 298 10E3/UL (ref 150–450)
POTASSIUM SERPL-SCNC: 4 MMOL/L (ref 3.4–5.3)
PROT SERPL-MCNC: 7 G/DL (ref 6.4–8.3)
RBC # BLD AUTO: 4.56 10E6/UL (ref 3.8–5.2)
SODIUM SERPL-SCNC: 132 MMOL/L (ref 135–145)
WBC # BLD AUTO: 14.2 10E3/UL (ref 4–11)

## 2025-06-21 PROCEDURE — 99285 EMERGENCY DEPT VISIT HI MDM: CPT | Mod: 25 | Performed by: EMERGENCY MEDICINE

## 2025-06-21 PROCEDURE — 99284 EMERGENCY DEPT VISIT MOD MDM: CPT | Performed by: EMERGENCY MEDICINE

## 2025-06-21 PROCEDURE — 258N000003 HC RX IP 258 OP 636: Performed by: EMERGENCY MEDICINE

## 2025-06-21 PROCEDURE — 82310 ASSAY OF CALCIUM: CPT | Performed by: EMERGENCY MEDICINE

## 2025-06-21 PROCEDURE — 250N000011 HC RX IP 250 OP 636: Performed by: EMERGENCY MEDICINE

## 2025-06-21 PROCEDURE — 83735 ASSAY OF MAGNESIUM: CPT | Performed by: EMERGENCY MEDICINE

## 2025-06-21 PROCEDURE — 83690 ASSAY OF LIPASE: CPT | Performed by: EMERGENCY MEDICINE

## 2025-06-21 PROCEDURE — 85004 AUTOMATED DIFF WBC COUNT: CPT | Performed by: EMERGENCY MEDICINE

## 2025-06-21 PROCEDURE — 84702 CHORIONIC GONADOTROPIN TEST: CPT | Performed by: EMERGENCY MEDICINE

## 2025-06-21 PROCEDURE — 76801 OB US < 14 WKS SINGLE FETUS: CPT

## 2025-06-21 PROCEDURE — 36415 COLL VENOUS BLD VENIPUNCTURE: CPT | Performed by: EMERGENCY MEDICINE

## 2025-06-21 PROCEDURE — 96361 HYDRATE IV INFUSION ADD-ON: CPT | Performed by: EMERGENCY MEDICINE

## 2025-06-21 PROCEDURE — 96374 THER/PROPH/DIAG INJ IV PUSH: CPT | Performed by: EMERGENCY MEDICINE

## 2025-06-21 RX ORDER — ONDANSETRON 2 MG/ML
4 INJECTION INTRAMUSCULAR; INTRAVENOUS ONCE
Status: COMPLETED | OUTPATIENT
Start: 2025-06-21 | End: 2025-06-21

## 2025-06-21 RX ORDER — DOXYLAMINE SUCCINATE AND PYRIDOXINE HYDROCHLORIDE, DELAYED RELEASE TABLETS 10 MG/10 MG 10; 10 MG/1; MG/1
10 TABLET, DELAYED RELEASE ORAL 2 TIMES DAILY PRN
Qty: 30 TABLET | Refills: 0 | Status: SHIPPED | OUTPATIENT
Start: 2025-06-21

## 2025-06-21 RX ADMIN — ONDANSETRON 4 MG: 2 INJECTION INTRAMUSCULAR; INTRAVENOUS at 18:07

## 2025-06-21 RX ADMIN — SODIUM CHLORIDE 1000 ML: 9 INJECTION, SOLUTION INTRAVENOUS at 17:18

## 2025-06-21 ASSESSMENT — ACTIVITIES OF DAILY LIVING (ADL)
ADLS_ACUITY_SCORE: 57

## 2025-06-21 NOTE — ED TRIAGE NOTES
Pt had a positive home pregnancy test and thinks she is approx 6wks pregnant, reports nausea and vomiting. Pt said she is unable to keep water down. Pt observed drinking fluids in waiting area.

## 2025-06-21 NOTE — PROCEDURES
Patient state experiencing, dizziness, that gets better when close shut her eyes. Fluid is running.  MD is aware.

## 2025-06-21 NOTE — ED PROVIDER NOTES
[facility] EMERGENCY DEPARTMENT ([facility])    25      ED PROVIDER NOTE      History     Chief Complaint   Patient presents with    Nausea & Vomiting     Pt had a positive home pregnancy test and thinks she is approx 6wks pregnant, reports nausea and vomiting. Pt said she is unable to keep water down. Pt observed drinking fluids in waiting area.      ILAN Musa is a 35 year old female with a history of severe preeclampsia, gestational diabetes, migraines, who presents to the ED after a positive home pregnancy test. Patient reports dizziness, weakness, and fatigue starting 2 weeks ago. Patient notes last night was the worst of her symptoms. Patient reports her last period was 1 month ago on May 22nd. Patient states she did a home pregnancy test that was positive so she thinks it might be due to that. Patient reports when she is pregnant she usually has similar symptoms to this. Patient denies abdominal pain, vaginal bleeding, vaginal discharge, chest pain, and shortness of breath.     Past Medical History  Past Medical History:   Diagnosis Date    Migraines      Past Surgical History:   Procedure Laterality Date     SECTION N/A 2024    Procedure:  section;  Surgeon: Delores Wooten MD;  [facility]     acetaminophen (TYLENOL) 325 MG tablet  benzocaine-menthol (DERMOPLAST) 20-0.5 % AERO  bisacodyl (DULCOLAX) 10 MG suppository  Cholecalciferol (VITAMIN D3) 50 MCG ( UT) CAPS  dibucaine 1 % OINT rectal ointment  diclofenac (VOLTAREN) 1 % topical gel  docusate sodium (COLACE) 100 MG capsule  doxylamine (UNISOM) 25 MG TABS tablet  hydrocortisone (CORTAID) 1 % external cream  ibuprofen (ADVIL/MOTRIN) 800 MG tablet  ibuprofen (ADVIL/MOTRIN) 800 MG tablet  meclizine (ANTIVERT) 25 MG tablet  NIFEdipine ER OSMOTIC (ADALAT CC) 30 MG 24 hr tablet  NIFEdipine ER OSMOTIC (ADALAT CC) 60 MG 24 hr tablet  phenylephrine-shark liver oil-mineral oil-petrolatum (PREPARATION  H) 0.25-14-74.9 % rectal ointment  prednisoLONE acetate (PRED FORTE) 1 % ophthalmic suspension  Prenatal Vit-Fe Fumarate-FA (PRENATAL MULTIVITAMIN W/IRON) 27-0.8 MG tablet  psyllium (METAMUCIL SMOOTH TEXTURE) 28 % packet  witch hazel-glycerin (TUCKS) pad      No Known Allergies  Family History  Family History   Problem Relation Age of Onset    Hypertension Mother     Glaucoma No family hx of     Macular Degeneration No family hx of      Social History   Social History     Tobacco Use    Smoking status: Never    Smokeless tobacco: Never   Substance Use Topics    Alcohol use: No    Drug use: No      Past medical history, past surgical history, medications, allergies, family history, and social history were reviewed with the patient. No additional pertinent items.   A complete review of systems was performed with pertinent positives and negatives noted in the HPI, and all other systems negative.    Physical Exam   BP: 127/76  Pulse: 80  Temp: 98.2  F (36.8  C)  Resp: 16  SpO2: 98 %  Physical Exam  Vitals and nursing note reviewed.   Constitutional:       General: She is not in acute distress.     Appearance: Normal appearance. She is well-developed.   HENT:      Head: Normocephalic and atraumatic.   Eyes:      General: No scleral icterus.     Conjunctiva/sclera: Conjunctivae normal.   Cardiovascular:      Rate and Rhythm: Normal rate.   Pulmonary:      Effort: Pulmonary effort is normal. No respiratory distress.   Abdominal:      General: Abdomen is flat.   Musculoskeletal:      Cervical back: Normal range of motion and neck supple.   Skin:     General: Skin is warm and dry.      Findings: No rash.   Neurological:      Mental Status: She is alert and oriented to person, place, and time.             ED Course, Procedures, & Data      Procedures               Medications   sodium chloride 0.9% BOLUS 1,000 mL (has no administration in time range)          Critical care was not performed.     Medical Decision Making  The  patient's presentation was of high complexity (an acute health issue posing potential threat to life or bodily function).    The patient's evaluation involved:  ordering and/or review of 3+ test(s) in this encounter (see separate area of note for details)    The patient's management necessitated moderate risk (prescription drug management including medications given in the ED).    Assessment & Plan      35 year old female with a history of severe preeclampsia, gestational diabetes, migraines, who presents to the ED after a positive home pregnancy test. Patient reports dizziness, weakness, and fatigue starting 2 weeks ago.  Vital signs stable afebrile including normal pulse ox at 90% on room air.  IV established, labs sent which revealed stable CBC and electrolytes, lipase 33, beta-hCG 111,000.  Patient given a liter normal saline IV fluid bolus along with Zofran 4 mg IV.  Patient sent to ultrasound for imaging of the pelvis which revealed live IUP at 8 weeks and 4 days.  Upon repeat assessment patient symptoms are well-controlled she is tolerating p.o. without difficulty.  Plan for follow-up with OB for further evaluation and care.    I have reviewed the nursing notes. I have reviewed the findings, diagnosis, plan and need for follow up with the patient.    New Prescriptions    No medications on file       Final diagnoses:   Morning sickness       Sav Quan MD  [facility] EMERGENCY DEPARTMENT  6/21/2025     Sav Quan MD  06/23/25 0117

## 2025-06-22 NOTE — DISCHARGE INSTRUCTIONS
Please make an appointment to follow up with OB/Gyn - [facility] (phone: [phone]) in 7-14 days.

## 2025-07-08 ENCOUNTER — VIRTUAL VISIT (OUTPATIENT)
Dept: OBGYN | Facility: CLINIC | Age: 35
End: 2025-07-08
Payer: COMMERCIAL

## 2025-07-08 ENCOUNTER — TELEPHONE (OUTPATIENT)
Dept: OBGYN | Facility: CLINIC | Age: 35
End: 2025-07-08

## 2025-07-08 VITALS — BODY MASS INDEX: 28.23 KG/M2 | HEIGHT: 66 IN

## 2025-07-08 DIAGNOSIS — Z23 NEED FOR DIPHTHERIA-TETANUS-PERTUSSIS (TDAP) VACCINE: ICD-10-CM

## 2025-07-08 DIAGNOSIS — O09.529 ENCOUNTER FOR SUPERVISION OF HIGH RISK MULTIGRAVIDA OF ADVANCED MATERNAL AGE, ANTEPARTUM: Primary | ICD-10-CM

## 2025-07-08 PROBLEM — R14.1 FLATULENCE, ERUCTATION AND GAS PAIN: Status: RESOLVED | Noted: 2020-06-09 | Resolved: 2025-07-08

## 2025-07-08 PROBLEM — Z34.02 ENCOUNTER FOR SUPERVISION OF NORMAL FIRST PREGNANCY IN SECOND TRIMESTER: Status: RESOLVED | Noted: 2020-01-13 | Resolved: 2025-07-08

## 2025-07-08 PROBLEM — R14.3 FLATULENCE, ERUCTATION AND GAS PAIN: Status: RESOLVED | Noted: 2020-06-09 | Resolved: 2025-07-08

## 2025-07-08 PROBLEM — Z36.89 ENCOUNTER FOR TRIAGE IN PREGNANT PATIENT: Status: RESOLVED | Noted: 2024-08-12 | Resolved: 2025-07-08

## 2025-07-08 PROBLEM — R14.2 FLATULENCE, ERUCTATION AND GAS PAIN: Status: RESOLVED | Noted: 2020-06-09 | Resolved: 2025-07-08

## 2025-07-08 LAB
ABO + RH BLD: NORMAL
BLD GP AB SCN SERPL QL: NEGATIVE
SPECIMEN EXP DATE BLD: NORMAL

## 2025-07-08 PROCEDURE — 99207 PR NO CHARGE NURSE ONLY: CPT | Mod: 93

## 2025-07-08 RX ORDER — FLUTICASONE PROPIONATE 50 MCG
SPRAY, SUSPENSION (ML) NASAL
COMMUNITY
Start: 2025-03-28 | End: 2025-07-08

## 2025-07-08 RX ORDER — CLINDAMYCIN PHOSPHATE 1 G/10ML
GEL TOPICAL
COMMUNITY
End: 2025-07-08

## 2025-07-08 RX ORDER — CETIRIZINE HYDROCHLORIDE 10 MG/1
1 TABLET ORAL
COMMUNITY
Start: 2025-04-24 | End: 2025-07-08

## 2025-07-08 RX ORDER — BISMUTH SUBSALICYLATE 262 MG/1
TABLET ORAL
COMMUNITY
Start: 2025-02-12 | End: 2025-07-08

## 2025-07-08 RX ORDER — ONDANSETRON 4 MG/1
TABLET, FILM COATED ORAL
COMMUNITY
Start: 2025-02-05 | End: 2025-07-08

## 2025-07-08 RX ORDER — OMEPRAZOLE 20 MG/1
1 CAPSULE, DELAYED RELEASE ORAL
COMMUNITY
Start: 2025-02-05 | End: 2025-07-08

## 2025-07-08 NOTE — PROGRESS NOTES
Important Information for Provider:       New ob nurse intake by phone, third pregnancy, AMA. Recent C section 8/13/2025( breech) Dr Wooten performed the C section  History of preeclampsia/ postpartum preeclampsia , was taking medication through postpartum period.  History of gestational diabetes with both pregnancies, diet controlled  Patient is leaving out of the country 7/13/2025 until 10/2025. Rescheduled her appointments. NOB with [facility] 7/09/2025, ultrasound 7/10/2025( could not correlate them on the same day too short of notice)    Patient is requesting prenatal vitamins to the updated pharmacy. TE sent to [facility]    Hypertensive labs ordered with NOB labs  Patient states her periods were normal after delivery      .       Prenatal OB Questionnaire     Patient supplied answers from flow sheet for:  Prenatal OB Questionnaire.  Past Medical History  Have you ever recieved care for your mental health? : No  Have you ever been in a major accident or suffered serious trauma?: No  Within the last year, has anyone hit, slapped, kicked or otherwise hurt you?: No  In the last year, has anyone forced you to have sex when you didn't want to?: No    Past Medical History 2   Have you ever received a blood transfusion?: No  Would you accept a blood transfusion if was medically recommended?: Unknown  Does anyone in your home smoke?: No   Is your blood type Rh negative?: No  Have you ever ?: No  Have you been hospitalized for a nonsurgical reason excluding normal delivery?: No  Have you ever had an abnormal pap smear?: No    Past Medical History (Continued)  Do you have a history of abnormalities of the uterus?: No  Did your mother take SYLVAIN or any other hormones when she was pregnant with you?: No  Do you have any other problems we have not asked about which you feel may be important to this pregnancy?: No              Allergies as of 7/8/2025:    Allergies as of 07/08/2025    (No Known Allergies)

## 2025-07-09 ENCOUNTER — PRENATAL OFFICE VISIT (OUTPATIENT)
Dept: OBGYN | Facility: CLINIC | Age: 35
End: 2025-07-09
Payer: COMMERCIAL

## 2025-07-09 VITALS
DIASTOLIC BLOOD PRESSURE: 88 MMHG | BODY MASS INDEX: 28.42 KG/M2 | WEIGHT: 176.1 LBS | OXYGEN SATURATION: 98 % | HEART RATE: 80 BPM | SYSTOLIC BLOOD PRESSURE: 122 MMHG

## 2025-07-09 DIAGNOSIS — O09.529 ENCOUNTER FOR SUPERVISION OF HIGH RISK MULTIGRAVIDA OF ADVANCED MATERNAL AGE, ANTEPARTUM: ICD-10-CM

## 2025-07-09 LAB
ALBUMIN MFR UR ELPH: 9.4 MG/DL
ALBUMIN UR-MCNC: NEGATIVE MG/DL
ALT SERPL W P-5'-P-CCNC: 11 U/L (ref 0–50)
APPEARANCE UR: CLEAR
AST SERPL W P-5'-P-CCNC: 19 U/L (ref 0–45)
BILIRUB UR QL STRIP: NEGATIVE
COLOR UR AUTO: YELLOW
CREAT SERPL-MCNC: 0.4 MG/DL (ref 0.51–0.95)
CREAT UR-MCNC: 61 MG/DL
EGFRCR SERPLBLD CKD-EPI 2021: >90 ML/MIN/1.73M2
EST. AVERAGE GLUCOSE BLD GHB EST-MCNC: 123 MG/DL
GLUCOSE UR STRIP-MCNC: NEGATIVE MG/DL
HBA1C MFR BLD: 5.9 % (ref 0–5.6)
HBV SURFACE AG SERPL QL IA: NONREACTIVE
HGB UR QL STRIP: NEGATIVE
HIV 1+2 AB+HIV1 P24 AG SERPL QL IA: NONREACTIVE
KETONES UR STRIP-MCNC: NEGATIVE MG/DL
LEUKOCYTE ESTERASE UR QL STRIP: ABNORMAL
NITRATE UR QL: NEGATIVE
PH UR STRIP: 6 [PH] (ref 5–7)
PROT/CREAT 24H UR: 0.15 MG/MG CR (ref 0–0.2)
RUBV IGG SERPL QL IA: 6.13 INDEX
RUBV IGG SERPL QL IA: POSITIVE
SP GR UR STRIP: 1.01 (ref 1–1.03)
T PALLIDUM AB SER QL: NONREACTIVE
UROBILINOGEN UR STRIP-ACNC: 0.2 E.U./DL

## 2025-07-09 PROCEDURE — 81003 URINALYSIS AUTO W/O SCOPE: CPT

## 2025-07-09 PROCEDURE — 82565 ASSAY OF CREATININE: CPT

## 2025-07-09 PROCEDURE — 86901 BLOOD TYPING SEROLOGIC RH(D): CPT

## 2025-07-09 PROCEDURE — 84450 TRANSFERASE (AST) (SGOT): CPT

## 2025-07-09 PROCEDURE — 87340 HEPATITIS B SURFACE AG IA: CPT

## 2025-07-09 PROCEDURE — 86900 BLOOD TYPING SEROLOGIC ABO: CPT

## 2025-07-09 PROCEDURE — 86762 RUBELLA ANTIBODY: CPT

## 2025-07-09 PROCEDURE — 84156 ASSAY OF PROTEIN URINE: CPT

## 2025-07-09 PROCEDURE — 86780 TREPONEMA PALLIDUM: CPT

## 2025-07-09 PROCEDURE — 87389 HIV-1 AG W/HIV-1&-2 AB AG IA: CPT

## 2025-07-09 PROCEDURE — 83036 HEMOGLOBIN GLYCOSYLATED A1C: CPT

## 2025-07-09 PROCEDURE — 36415 COLL VENOUS BLD VENIPUNCTURE: CPT

## 2025-07-09 PROCEDURE — 86850 RBC ANTIBODY SCREEN: CPT

## 2025-07-09 PROCEDURE — 84460 ALANINE AMINO (ALT) (SGPT): CPT

## 2025-07-09 RX ORDER — PRENATAL VIT/IRON FUM/FOLIC AC 27MG-0.8MG
1 TABLET ORAL DAILY
Qty: 180 TABLET | Refills: 3 | Status: SHIPPED | OUTPATIENT
Start: 2025-07-09

## 2025-07-09 NOTE — PROGRESS NOTES
SUBJECTIVE:     HPI:    This is a 35 year old female patient,  who presents for her first obstetrical visit.    PAL: 2026, by Last Menstrual Period.  She is 7w0d weeks.  Her cycles are regular.  Her last menstrual period was normal.   Since her LMP, she has had no complaints).   She denies nausea, vaginal bleeding, and constipation.    Additional History:   -hx GDM/Pre-e   -hx 1 c/s at term due to breech position      HISTORY:   Planned Pregnancy: Yes  Marital Status:   Occupation: PCA  Living in Household: Spouse and Children    Past History:  Her past medical history   Past Medical History:   Diagnosis Date    History of gestational diabetes     History of pre-eclampsia     Migraines    .      She has a history of  preeclampsia, gestational diabetes, diet controlled, and prior  section    Since her last LMP she denies use of alcohol, tobacco and street drugs.    Past medical, surgical, social and family history were reviewed and updated in [facility].        Current Outpatient Medications   Medication Sig Dispense Refill    Prenatal Vit-Fe Fumarate-FA (PRENATAL MULTIVITAMIN W/IRON) 27-0.8 MG tablet Take 1 tablet by mouth daily. 180 tablet 3     No current facility-administered medications for this visit.       ROS:   12 point review of systems negative other than symptoms noted below or in the HPI.      OBJECTIVE:     EXAM:  /88 (BP Location: Right arm, Patient Position: Sitting, Cuff Size: Adult Large)   Pulse 80   Wt 79.9 kg (176 lb 1.6 oz)   LMP 2025   SpO2 98%   BMI 28.42 kg/m   Body mass index is 28.42 kg/m .    GENERAL: alert and no distress  EYES: Eyes grossly normal to inspection, PERRL and conjunctivae and sclerae normal  NECK: no adenopathy, no asymmetry, masses, or scars  RESP:RRR  CV: regular rate and rhythm, no peripheral edema  ABDOMEN: soft, nontender, no hepatosplenomegaly, no masses and bowel sounds normal  MS: no gross musculoskeletal defects noted, no  edema  SKIN: no suspicious lesions or rashes  NEURO: Normal strength and tone, mentation intact and speech normal  PSYCH: mentation appears normal, affect normal/bright    ASSESSMENT/PLAN:       ICD-10-CM    1. Encounter for supervision of high risk multigravida of advanced maternal age, antepartum  O09.529 Hemoglobin A1c     UA without Microscopic - lab collect     Hepatitis C antibody     AST     ALT     Protein  random urine     Creatinine     ABO/Rh type and screen     Hepatitis B surface antigen     HIV Antigen Antibody Combo     Rubella Antibody IgG     Treponema Abs w Reflex to RPR and Titer     Urine Culture Aerobic Bacterial          35 year old , 7w0d weeks of pregnancy with PAL of 2026, by Last Menstrual Period    Discussed as follows:  -viability US tomrrow  -pap utd  -dec gc  -lvl 2 fas recc at 18-20 due to ama  -nob labs today  -feeling well no concerns  -filled script of pnv sent to pharmacy  -oriented to care- saw CNM first pregnancy, [facility] MD Dr. Wooten delivered second pregnancy, plans [facility] care this pregnancy  -rn triage vs mychart for contact  -leaving on extended vacation next week until October- recc monthly visit while she is on her vacation- needs US at 18-20, gct 24 wks  -when she returns make apt to return to pnc  -flu covid tdap RSV recc in pregnancy  -hx pre-e , GDM c/s x1 2024    Counseling given:   - Follow up in 4-6 weeks for return OB visit.  - Recommended weight gain for pregnancy: 15-25 lbs.        PLAN/PATIENT INSTRUCTIONS:    Rtc 4 wks      SUSAN Gooden CNP

## 2025-07-10 LAB
BACTERIA UR CULT: NORMAL
HCV AB SERPL QL IA: NONREACTIVE

## 2025-07-13 ENCOUNTER — APPOINTMENT (OUTPATIENT)
Dept: ULTRASOUND IMAGING | Facility: CLINIC | Age: 35
End: 2025-07-13
Attending: PHYSICIAN ASSISTANT
Payer: COMMERCIAL

## 2025-07-13 ENCOUNTER — HOSPITAL ENCOUNTER (EMERGENCY)
Facility: CLINIC | Age: 35
Discharge: HOME | End: 2025-07-13
Attending: FAMILY MEDICINE | Admitting: FAMILY MEDICINE
Payer: COMMERCIAL

## 2025-07-13 VITALS
OXYGEN SATURATION: 100 % | TEMPERATURE: 98.2 F | BODY MASS INDEX: 27.9 KG/M2 | WEIGHT: 173.6 LBS | HEIGHT: 66 IN | DIASTOLIC BLOOD PRESSURE: 82 MMHG | HEART RATE: 72 BPM | SYSTOLIC BLOOD PRESSURE: 114 MMHG | RESPIRATION RATE: 18 BRPM

## 2025-07-13 DIAGNOSIS — O03.9 MISCARRIAGE: ICD-10-CM

## 2025-07-13 LAB
ALBUMIN SERPL BCG-MCNC: 3.6 G/DL (ref 3.5–5.2)
ALBUMIN UR-MCNC: NEGATIVE MG/DL
ALP SERPL-CCNC: 81 U/L (ref 40–150)
ALT SERPL W P-5'-P-CCNC: 10 U/L (ref 0–50)
ANION GAP SERPL CALCULATED.3IONS-SCNC: 12 MMOL/L (ref 7–15)
APPEARANCE UR: CLEAR
AST SERPL W P-5'-P-CCNC: 15 U/L (ref 0–45)
BASOPHILS # BLD AUTO: 0 10E3/UL (ref 0–0.2)
BASOPHILS NFR BLD AUTO: 0 %
BILIRUB SERPL-MCNC: 0.8 MG/DL
BILIRUB UR QL STRIP: NEGATIVE
BUN SERPL-MCNC: 6.1 MG/DL (ref 6–20)
CALCIUM SERPL-MCNC: 8.9 MG/DL (ref 8.8–10.4)
CHLORIDE SERPL-SCNC: 104 MMOL/L (ref 98–107)
CLUE CELLS: ABNORMAL
COLOR UR AUTO: ABNORMAL
CREAT SERPL-MCNC: 0.41 MG/DL (ref 0.51–0.95)
EGFRCR SERPLBLD CKD-EPI 2021: >90 ML/MIN/1.73M2
EOSINOPHIL # BLD AUTO: 0 10E3/UL (ref 0–0.7)
EOSINOPHIL NFR BLD AUTO: 0 %
ERYTHROCYTE [DISTWIDTH] IN BLOOD BY AUTOMATED COUNT: 12.2 % (ref 10–15)
GLUCOSE SERPL-MCNC: 117 MG/DL (ref 70–99)
GLUCOSE UR STRIP-MCNC: NEGATIVE MG/DL
HCG INTACT+B SERPL-ACNC: 3370 MIU/ML
HCO3 SERPL-SCNC: 20 MMOL/L (ref 22–29)
HCT VFR BLD AUTO: 38.5 % (ref 35–47)
HGB BLD-MCNC: 13.2 G/DL (ref 11.7–15.7)
HGB UR QL STRIP: ABNORMAL
IMM GRANULOCYTES # BLD: 0 10E3/UL
IMM GRANULOCYTES NFR BLD: 0 %
KETONES UR STRIP-MCNC: NEGATIVE MG/DL
LEUKOCYTE ESTERASE UR QL STRIP: NEGATIVE
LYMPHOCYTES # BLD AUTO: 1.6 10E3/UL (ref 0.8–5.3)
LYMPHOCYTES NFR BLD AUTO: 15 %
MCH RBC QN AUTO: 30.3 PG (ref 26.5–33)
MCHC RBC AUTO-ENTMCNC: 34.3 G/DL (ref 31.5–36.5)
MCV RBC AUTO: 88 FL (ref 78–100)
MONOCYTES # BLD AUTO: 0.7 10E3/UL (ref 0–1.3)
MONOCYTES NFR BLD AUTO: 6 %
NEUTROPHILS # BLD AUTO: 8.2 10E3/UL (ref 1.6–8.3)
NEUTROPHILS NFR BLD AUTO: 77 %
NITRATE UR QL: NEGATIVE
NRBC # BLD AUTO: 0 10E3/UL
NRBC BLD AUTO-RTO: 0 /100
PH UR STRIP: 7 [PH] (ref 5–7)
PLATELET # BLD AUTO: 252 10E3/UL (ref 150–450)
POTASSIUM SERPL-SCNC: 3.9 MMOL/L (ref 3.4–5.3)
PROT SERPL-MCNC: 6.7 G/DL (ref 6.4–8.3)
RBC # BLD AUTO: 4.36 10E6/UL (ref 3.8–5.2)
RBC URINE: 0 /HPF
SODIUM SERPL-SCNC: 136 MMOL/L (ref 135–145)
SP GR UR STRIP: 1.01 (ref 1–1.03)
SQUAMOUS EPITHELIAL: 1 /HPF
TRICHOMONAS, WET PREP: ABNORMAL
UROBILINOGEN UR STRIP-MCNC: NORMAL MG/DL
WBC # BLD AUTO: 10.5 10E3/UL (ref 4–11)
WBC URINE: 1 /HPF
WBC'S/HIGH POWER FIELD, WET PREP: ABNORMAL
YEAST, WET PREP: ABNORMAL

## 2025-07-13 PROCEDURE — 81003 URINALYSIS AUTO W/O SCOPE: CPT | Performed by: PHYSICIAN ASSISTANT

## 2025-07-13 PROCEDURE — 87491 CHLMYD TRACH DNA AMP PROBE: CPT | Performed by: PHYSICIAN ASSISTANT

## 2025-07-13 PROCEDURE — 99284 EMERGENCY DEPT VISIT MOD MDM: CPT | Mod: FS | Performed by: FAMILY MEDICINE

## 2025-07-13 PROCEDURE — 84702 CHORIONIC GONADOTROPIN TEST: CPT | Performed by: PHYSICIAN ASSISTANT

## 2025-07-13 PROCEDURE — 85025 COMPLETE CBC W/AUTO DIFF WBC: CPT | Performed by: PHYSICIAN ASSISTANT

## 2025-07-13 PROCEDURE — 76801 OB US < 14 WKS SINGLE FETUS: CPT

## 2025-07-13 PROCEDURE — 99284 EMERGENCY DEPT VISIT MOD MDM: CPT | Mod: 25 | Performed by: FAMILY MEDICINE

## 2025-07-13 PROCEDURE — 87210 SMEAR WET MOUNT SALINE/INK: CPT | Performed by: PHYSICIAN ASSISTANT

## 2025-07-13 PROCEDURE — 80053 COMPREHEN METABOLIC PANEL: CPT | Performed by: PHYSICIAN ASSISTANT

## 2025-07-13 PROCEDURE — 99285 EMERGENCY DEPT VISIT HI MDM: CPT | Mod: 25 | Performed by: FAMILY MEDICINE

## 2025-07-13 PROCEDURE — 36415 COLL VENOUS BLD VENIPUNCTURE: CPT | Performed by: PHYSICIAN ASSISTANT

## 2025-07-13 ASSESSMENT — ACTIVITIES OF DAILY LIVING (ADL)
ADLS_ACUITY_SCORE: 57

## 2025-07-13 ASSESSMENT — ENCOUNTER SYMPTOMS: PREGNANCY PROBLEM: 1

## 2025-07-13 NOTE — Clinical Note
Heike Musa was seen and treated in our emergency department on 7/13/2025.    She has an acute medical emergency, which prevents her from traveling. It is requested that you allow the patient and her accompanying  children to cancel their itinerary.  Please do not hesitate to contact us if you have further questions.    Corby Moy PA-C  Department of Emergency Medicine      Sincerely,     [facility] Emergency Department

## 2025-07-13 NOTE — ED PROVIDER NOTES
"ED Provider Note  [facility]      History     Chief Complaint   Patient presents with    Pregnancy Complications     Pt is 7 weeks pregnant.  Pt report \" after  moving a heavy bag from bed, I immediately start bleeding\".   Pt report bleeding is very minimal  This morning pt report symptoms of: clots, dizziness, and cramps.      A  was used ([place]).   Pregnancy Complications      34yo 11w4d pregannt  F no pmhx p/w vaginal bleeding and and pelvic cramping x yesterday.  Patient states she had an isolated period in which she had some vaginal bleeding, and passed some small clots.  This subsequently resolved, but she has had pelvic cramping since then.  No nausea, vomiting, vaginal discharge, fever, chills.  Chart review shows patient had confirmed IUP on 2025.          Past Medical History  Past Medical History:   Diagnosis Date    History of gestational diabetes     History of pre-eclampsia     Migraines      Past Surgical History:   Procedure Laterality Date     SECTION N/A 2024    Procedure:  section;  Surgeon: Delores Wooten MD;  Location: [facility] L+D     Prenatal Vit-Fe Fumarate-FA (PRENATAL MULTIVITAMIN W/IRON) 27-0.8 MG tablet      No Known Allergies  Family History  Family History   Problem Relation Age of Onset    Hypertension Mother     No Known Problems Father     No Known Problems Maternal Grandmother     No Known Problems Maternal Grandfather     No Known Problems Paternal Grandmother     No Known Problems Paternal Grandfather     No Known Problems Brother     No Known Problems Brother     No Known Problems Brother     No Known Problems Sister     No Known Problems Sister     No Known Problems Sister     No Known Problems Sister     Glaucoma No family hx of     Macular Degeneration No family hx of      Social History   Social History     Tobacco Use    Smoking status: Never    Smokeless tobacco: Never   Vaping Use    " "Vaping status: Never Used   Substance Use Topics    Alcohol use: No    Drug use: No      A medically appropriate review of systems was performed with pertinent positives and negatives noted in the HPI, and all other systems negative.    Physical Exam   BP: 114/82  Pulse: 72  Temp: 98.2  F (36.8  C)  Resp: 18  Height: 167.6 cm (5' 6\")  Weight: 78.7 kg (173 lb 9.6 oz)  SpO2: 100 %  Physical Exam  Constitutional:       General: She is not in acute distress.     Appearance: Normal appearance. She is not diaphoretic.   HENT:      Head: Atraumatic.      Mouth/Throat:      Mouth: Mucous membranes are moist.   Eyes:      Conjunctiva/sclera: Conjunctivae normal.   Cardiovascular:      Rate and Rhythm: Normal rate.   Pulmonary:      Effort: No respiratory distress.   Abdominal:      General: Abdomen is flat.      Palpations: Abdomen is soft.      Tenderness: There is no abdominal tenderness.   Genitourinary:     Vagina: Normal.      Uterus: Normal.       Comments: Os appears closed.  Scant old blood noted.  Declining bimanual.  SHEREE Martinez as chaperone  Musculoskeletal:      Cervical back: Neck supple.   Skin:     General: Skin is warm.   Neurological:      Mental Status: She is alert.           ED Course, Procedures, & Data      Procedures                Results for orders placed or performed during the hospital encounter of 07/13/25   OB  US 1st trimester w transvag    Impression    IMPRESSION:     1.  Findings diagnostic of pregnancy failure.    REFERENCE:  Diagnostic Criteria for Nonviable Pregnancy Early in the First Trimester. N Engl J Med. 2013; 369;15: 1443-51.   Comprehensive metabolic panel   Result Value Ref Range    Sodium 136 135 - 145 mmol/L    Potassium 3.9 3.4 - 5.3 mmol/L    Carbon Dioxide (CO2) 20 (L) 22 - 29 mmol/L    Anion Gap 12 7 - 15 mmol/L    Urea Nitrogen 6.1 6.0 - 20.0 mg/dL    Creatinine 0.41 (L) 0.51 - 0.95 mg/dL    GFR Estimate >90 >60 mL/min/1.73m2    Calcium 8.9 8.8 - 10.4 mg/dL    Chloride 104 " 98 - 107 mmol/L    Glucose 117 (H) 70 - 99 mg/dL    Alkaline Phosphatase 81 40 - 150 U/L    AST 15 0 - 45 U/L    ALT 10 0 - 50 U/L    Protein Total 6.7 6.4 - 8.3 g/dL    Albumin 3.6 3.5 - 5.2 g/dL    Bilirubin Total 0.8 <=1.2 mg/dL   UA with Microscopic reflex to Culture    Specimen: Urine, Midstream   Result Value Ref Range    Color Urine Straw Colorless, Straw, Light Yellow, Yellow    Appearance Urine Clear Clear    Glucose Urine Negative Negative mg/dL    Bilirubin Urine Negative Negative    Ketones Urine Negative Negative mg/dL    Specific Gravity Urine 1.009 1.003 - 1.035    Blood Urine Trace (A) Negative    pH Urine 7.0 5.0 - 7.0    Protein Albumin Urine Negative Negative mg/dL    Urobilinogen Urine Normal Normal mg/dL    Nitrite Urine Negative Negative    Leukocyte Esterase Urine Negative Negative    RBC Urine 0 <=2 /HPF    WBC Urine 1 <=5 /HPF    Squamous Epithelials Urine 1 <=1 /HPF   HCG quantitative pregnancy (blood)   Result Value Ref Range    hCG Quantitative 3,370 (H) <5 mIU/mL   CBC with platelets and differential   Result Value Ref Range    WBC Count 10.5 4.0 - 11.0 10e3/uL    RBC Count 4.36 3.80 - 5.20 10e6/uL    Hemoglobin 13.2 11.7 - 15.7 g/dL    Hematocrit 38.5 35.0 - 47.0 %    MCV 88 78 - 100 fL    MCH 30.3 26.5 - 33.0 pg    MCHC 34.3 31.5 - 36.5 g/dL    RDW 12.2 10.0 - 15.0 %    Platelet Count 252 150 - 450 10e3/uL    % Neutrophils 77 %    % Lymphocytes 15 %    % Monocytes 6 %    % Eosinophils 0 %    % Basophils 0 %    % Immature Granulocytes 0 %    NRBCs per 100 WBC 0 <1 /100    Absolute Neutrophils 8.2 1.6 - 8.3 10e3/uL    Absolute Lymphocytes 1.6 0.8 - 5.3 10e3/uL    Absolute Monocytes 0.7 0.0 - 1.3 10e3/uL    Absolute Eosinophils 0.0 0.0 - 0.7 10e3/uL    Absolute Basophils 0.0 0.0 - 0.2 10e3/uL    Absolute Immature Granulocytes 0.0 <=0.4 10e3/uL    Absolute NRBCs 0.0 10e3/uL   Wet prep    Specimen: Vagina; Swab   Result Value Ref Range    Trichomonas Absent Absent    Yeast Absent Absent     Clue Cells Absent Absent    WBCs/high power field 3+ (A) None     Medications - No data to display       Critical care was not performed.     Medical Decision Making  The patient's presentation was of high complexity (an acute health issue posing potential threat to life or bodily function).    The patient's evaluation involved:  review of external note(s) from 3+ sources (see separate area of note for details)  ordering and/or review of 3+ test(s) in this encounter (see separate area of note for details)  discussion of management or test interpretation with another health professional (see separate area of note for details)    The patient's management necessitated moderate risk (discussion of failed pregnancy management).    Assessment & Plan    36yo 11w4d pregannt  F no pmhx p/w isolated, now resolved vaginal bleeding and associated/continued pelvic cramping x yesterday.  Confirmed IUP on 2025.  In ED patient is HDS/AF, NAD, well-appearing.  Her abdomen is benign.  GYN exam shows a closed os, with scant old blood present. Declines bimanual exam.  DDx benign bleeding of early pregnancy versus threatened  versus missed  versus incomplete  versus other.  Patient documented is Rh+, no indication for RhoGAM.    Patient's workup today includes repeat sono showing failed pregnancy; no cardiac activity.  Additionally, patient's hCG has downtrended close by close to 100,000 from 3 weeks ago (111,[phone]-->3370).  CBC WNL.  CMP, UA, wet prep unremarkable gonorrhea and chlamydia pending.    With confirmed failed pregnancy, discussed management options with OB/GYN, with recommendations for either expectant management versus medical management at this time.  Had lengthy discussion with patient, and she would prefer expectant management with close OB/GYN follow-up.  OB/GYN will add patient to their follow-up list for this week, with plan for their office to reach out to her.  Additionally, I sent  an epic message to her primary OB provider.  Additionally, patient was scheduled to leave this evening for trip to [facility].  Per her request, she was provided a letter documenting her inability to travel given her acute condition, and request that the airline cancel her tickets.  Patient discharged anticipatory guidance, and ER return precautions for fever, or significant bleeding.      A professional [employer]  was used for all interactions.    I have reviewed the nursing notes. I have reviewed the findings, diagnosis, plan and need for follow up with the patient.    New Prescriptions    No medications on file       Final diagnoses:   Miscarriage         Corby Moy PA-C  [facility] EMERGENCY DEPARTMENT  7/13/2025     Corby Moy PA-C  07/13/25 9010    --    ED Attending Physician Attestation    I Mauricio Mathis MD, cared for this patient with the Advanced Practice Provider (STACY). I personally provided a substantive portion of the care for this patient, including approving the care plan for the number and complexity of problems addressed and taking responsibility related to the risk of complications and/or morbidity or mortality of patient management. Please see the STACY's documentation for full details.    Summary of HPI, PE, ED Course   Patient is a 35 year old female evaluated in the emergency department for first trimester pelvic cramping and bleeding.  She had a previous ultrasound and beta-hCG approximately 3 weeks ago so suggested that she would now be at approximately 12 weeks pregnant.  She is planning to travel to [place] for several months and so when she developed spotting and cramping came to be assessed. Exam and ED course notable for normal vital signs, benign exam, cervix closed with only scant blood in vaginal vault, beta-hCG has fallen significantly, and ultrasound is consistent with fetal demise/incomplete AB.  Options and bleeding precautions discussed with the  patient and she is electing currently for expectant management.  We discussed with OB/GYN, they will help the patient arrange an expedient clinic appointment given her plans to be leaving for [place] soon.. After the completion of care in the emergency department, the patient was discharged.  We discussed the indications for emergency department return and follow-up.  Stable for discharge.        Mauricio Mathis MD  Emergency Medicine          Mauricio Mathis MD  07/13/25 [id number]

## 2025-07-13 NOTE — ED TRIAGE NOTES
Triage Assessment (Adult)       Row Name 07/13/25 1125          Triage Assessment    Airway WDL WDL        Respiratory WDL    Respiratory WDL WDL        Skin Circulation/Temperature WDL    Skin Circulation/Temperature WDL WDL        Cardiac WDL    Cardiac WDL WDL        Peripheral/Neurovascular WDL    Peripheral Neurovascular WDL WDL        Cognitive/Neuro/Behavioral WDL    Cognitive/Neuro/Behavioral WDL WDL

## 2025-07-13 NOTE — Clinical Note
Heike Musa was seen and treated in our emergency department on 7/13/2025.    To Whom It May Concern:    Ms. Musa was seen in our emergency department on 7/13/2025.  She has an acute medical emergency, which prevents her from traveling for the next couple of weeks.  It is requested that you allow the patient and her traveling companions to reschedule their itinerary.  Please do not hesitate to contact us if you have further questions.    Corby Moy PA-C  Department of Emergency Medicine      Sincerely,     [facility] Emergency Department

## 2025-07-13 NOTE — Clinical Note
Heike Musa was seen and treated in our emergency department on 7/13/2025.    She has an acute medical emergency, which prevents her from traveling. It is requested that you allow the patient and her accompanying  children to cancel their itinerary.  Please do not hesitate to contact us if you have further questions.    Corby Moy PA-C  Department of Emergency Medicine      Sincerely,     [facility] Emergency Department

## 2025-07-14 ENCOUNTER — TELEPHONE (OUTPATIENT)
Dept: OBGYN | Facility: CLINIC | Age: 35
End: 2025-07-14
Payer: COMMERCIAL

## 2025-07-14 LAB
C TRACH DNA SPEC QL PROBE+SIG AMP: NEGATIVE
N GONORRHOEA DNA SPEC QL NAA+PROBE: NEGATIVE
SPECIMEN TYPE: NORMAL

## 2025-07-14 NOTE — TELEPHONE ENCOUNTER
----- Message from Elaina STEWARD sent at 7/14/2025 10:39 AM CDT -----  Regarding: FW: Close Follow up  Please help her schedule for follow up    Elaina Haynes RN  ----- Message -----  From: Elisa Rodriguez APRN CNP  Sent: 7/14/2025   9:59 AM CDT  To: Rd Reception; Rd Obgyn Triage  Subject: FW: Close Follow up                              Can we find an apt with any provider this week for missed AB- she had diag US7/13 in ED she can do expectant mgmt, vs meds, vs surgery. She told me she was going out of the country soon? So if we can get her before she leaves.  SUSAN Gooden CNP  ----- Message -----  From: Corby Moy PA-C  Sent: 7/13/2025   3:03 PM CDT  To: SUSAN Gooden CNP  Subject: Close Follow up                                  Hello,     I saw this patient in the ED today (Sunday, 7/13/2025).  She has a ultrasound confirmed failed pregnancy, with downtrending hCG.  Her os was also noted to be closed.  Given this, discussed management options with OB on-call with recommendations for either expectant versus medical management.  Patient elected for expectant management.  Hoping you can see her in close follow-up this week.    Many thanks,  Corby Moy PA-C

## 2025-07-14 NOTE — TELEPHONE ENCOUNTER
Called with an , got the pt scheduled for tomorrow on call with AO as advised by supervisor KIKE 7/14 SH

## 2025-07-15 ENCOUNTER — OFFICE VISIT (OUTPATIENT)
Dept: OBGYN | Facility: CLINIC | Age: 35
End: 2025-07-15
Payer: COMMERCIAL

## 2025-07-15 VITALS
BODY MASS INDEX: 27.63 KG/M2 | DIASTOLIC BLOOD PRESSURE: 81 MMHG | OXYGEN SATURATION: 99 % | WEIGHT: 171.2 LBS | SYSTOLIC BLOOD PRESSURE: 120 MMHG | TEMPERATURE: 97.6 F | HEART RATE: 86 BPM

## 2025-07-15 DIAGNOSIS — O03.9 MISCARRIAGE: Primary | ICD-10-CM

## 2025-07-15 PROCEDURE — 3079F DIAST BP 80-89 MM HG: CPT | Performed by: OBSTETRICS & GYNECOLOGY

## 2025-07-15 PROCEDURE — 3074F SYST BP LT 130 MM HG: CPT | Performed by: OBSTETRICS & GYNECOLOGY

## 2025-07-15 PROCEDURE — 99214 OFFICE O/P EST MOD 30 MIN: CPT | Performed by: OBSTETRICS & GYNECOLOGY

## 2025-07-15 RX ORDER — OXYCODONE HYDROCHLORIDE 5 MG/1
5 TABLET ORAL EVERY 6 HOURS PRN
Qty: 6 TABLET | Refills: 0 | Status: SHIPPED | OUTPATIENT
Start: 2025-07-15 | End: 2025-07-18

## 2025-07-15 RX ORDER — IBUPROFEN 600 MG/1
600 TABLET, FILM COATED ORAL EVERY 6 HOURS PRN
Qty: 60 TABLET | Refills: 0 | Status: SHIPPED | OUTPATIENT
Start: 2025-07-15

## 2025-07-15 RX ORDER — ACETAMINOPHEN 500 MG
1000 TABLET ORAL EVERY 8 HOURS PRN
Qty: 30 TABLET | Refills: 0 | Status: SHIPPED | OUTPATIENT
Start: 2025-07-15

## 2025-07-15 NOTE — PROGRESS NOTES
"  Assessment & Plan     Miscarriage  - Discussed miscarriage: natural history, etiology, natural progression.  - Discussed options for management: expectant, misoprostol, D&C.  - Discussed no increased risk of miscarriage in subsequent pregnancy after one miscarriage. After two miscarriages, risk of recurrence increases and work-up is initiated. Discussed possibility of trisomy or other genetic factor as potential cause. Karyotype may be attempted with D&C.  Currently she prefers expectant management. Discussed miscarriage process and symptoms, bleeding pattern. Reviewed precautions for when to call. Discussed risk of retained products and infection.   We will have our nursing team reach out to her by phone in a few days. She will let us know if she thinks miscarriage process has happened and we will order follow-up ultrasound or HCGs.     - oxyCODONE (ROXICODONE) 5 MG tablet; Take 1 tablet (5 mg) by mouth every 6 hours as needed for severe pain.  - acetaminophen (TYLENOL) 500 MG tablet; Take 2 tablets (1,000 mg) by mouth every 8 hours as needed for mild pain.  - ibuprofen (ADVIL/MOTRIN) 600 MG tablet; Take 1 tablet (600 mg) by mouth every 6 hours as needed for moderate pain.    BMI  Estimated body mass index is 27.63 kg/m  as calculated from the following:    Height as of 7/13/25: 1.676 m (5' 6\").    Weight as of this encounter: 77.7 kg (171 lb 3.2 oz).           Ashkan Burroughs is a 35 year old, presenting for the following health issues:  Follow Up (Re: Expectant Preparation for Miscarriage./)    HPI    Presents for discussion of miscarriage.   Patient's last menstrual period was 05/21/2025.   However ultrasound on 6/21/25 showed viable IUP at 8 w 4 d.   Yesterday she had some light bleeding and mild cramping at home and went to ED.   Bleeding stopped but ultrasound showed no cardiac activity at CRL 9 w 2 d.    She does not have cramping or bleeding today   She does not know how she wants to proceed.     Past " Medical History:   Diagnosis Date    History of gestational diabetes     History of pre-eclampsia     Migraines        Past Surgical History:   Procedure Laterality Date     SECTION N/A 2024    Procedure:  section;  Surgeon: Delores Wooten MD;  Location:  L+D       Family History   Problem Relation Age of Onset    Hypertension Mother     No Known Problems Father     No Known Problems Maternal Grandmother     No Known Problems Maternal Grandfather     No Known Problems Paternal Grandmother     No Known Problems Paternal Grandfather     No Known Problems Brother     No Known Problems Brother     No Known Problems Brother     No Known Problems Sister     No Known Problems Sister     No Known Problems Sister     No Known Problems Sister     Glaucoma No family hx of     Macular Degeneration No family hx of        Social History     Socioeconomic History    Marital status:      Spouse name: Not on file    Number of children: Not on file    Years of education: Not on file    Highest education level: Not on file   Occupational History    Not on file   Tobacco Use    Smoking status: Never    Smokeless tobacco: Never   Vaping Use    Vaping status: Never Used   Substance and Sexual Activity    Alcohol use: No    Drug use: No    Sexual activity: Yes     Partners: Male   Other Topics Concern    Not on file   Social History Narrative    Not on file     Social Drivers of Health     Financial Resource Strain: Not on File (2022)    Received from [facility]    Financial Resource Strain     Financial Resource Strain: 0   Food Insecurity: Not on File (2024)    Received from [facility]    Food Insecurity     Food: 0   Transportation Needs: Not on File (2022)    Received from [facility]    Transportation Needs     Transportation: 0   Physical Activity: Not on File (2022)    Received from [facility]    Physical Activity     Physical Activity: 0   Stress: Not on File (2022)    Received from  [facility]    Stress     Stress: 0   Social Connections: Not on File (2024)    Received from [facility]    Social Connections     Connectedness: 0   Interpersonal Safety: Low Risk  (2024)    Interpersonal Safety     Do you feel physically and emotionally safe where you currently live?: Yes     Within the past 12 months, have you been hit, slapped, kicked or otherwise physically hurt by someone?: No     Within the past 12 months, have you been humiliated or emotionally abused in other ways by your partner or ex-partner?: No   Housing Stability: Not on File (2022)    Received from [employer]    Housing Stability     Housin       Current Outpatient Medications   Medication Sig Dispense Refill    Prenatal Vit-Fe Fumarate-FA (PRENATAL MULTIVITAMIN W/IRON) 27-0.8 MG tablet Take 1 tablet by mouth daily. 180 tablet 3     No current facility-administered medications for this visit.        No Known Allergies        Review of Systems  Constitutional, HEENT, cardiovascular, pulmonary, gi and gu systems are negative, except as otherwise noted.      Objective    /81   Pulse 86   Temp 97.6  F (36.4  C) (Oral)   Wt 77.7 kg (171 lb 3.2 oz)   LMP 2025   SpO2 99%   Breastfeeding No   BMI 27.63 kg/m    Body mass index is 27.63 kg/m .  Physical Exam   GENERAL: alert and no distress  NECK: no adenopathy, no asymmetry, masses, or scars  RESP: lungs clear to auscultation - no rales, rhonchi or wheezes  CV: regular rate and rhythm, normal S1 S2, no S3 or S4, no murmur, click or rub, no peripheral edema  ABDOMEN: soft, nontender, no hepatosplenomegaly, no masses and bowel sounds normal  MS: no gross musculoskeletal defects noted, no edema  PSYCH: mentation appears normal, affect normal/bright    EXAM: US OB <14 WEEKS TRANSABDOMINAL ONLY  LOCATION: [facility]  DATE: 2025     INDICATION: Vaginal bleeding in pregnancy.  COMPARISON: Ultrasound 2025.  TECHNIQUE:  Transabdominal scans were performed.      FINDINGS:     UTERUS: Contains a fundal gestational sac with a single embryo. Cervix is closed and measures 3.8 cm in length.  CRL: Measures 2.5 cm, equals 9 weeks 2 days.  RATE OF CARDIAC ACTIVITY: No fetal cardiac activity is detected.  AMNIOTIC FLUID: Normal.  PLACENTA: Forming superiorly. No evidence for sub-chorionic hemorrhage.     RIGHT OVARY: 2.4 x 2.0 x 1.5 cm. Normal appearance.  LEFT OVARY: 2.2 x 1.6 x 1.2 cm. Normal appearance.     No visualized adnexal mass or free pelvic fluid.                                                                      IMPRESSION:      1.  Findings diagnostic of pregnancy failure.           EXAM: US OB <14 WEEKS WITH TRANSVAGINAL SINGLE  LOCATION: [facility]  DATE: 6/21/2025     INDICATION: Dizziness, nausea vomiting pain.  COMPARISON: None.  TECHNIQUE: Transabdominal scans were performed. Endovaginal ultrasound was performed to better visualize the embryo.     FINDINGS:  UTERUS: Single normal appearing intrauterine gestation sac.  CRL: Measures 1.9 cm, equals 8 weeks 4 days.  RATE OF CARDIAC ACTIVITY: 149 bpm.  AMNIOTIC FLUID: Normal.  PLACENTA: Not yet formed. No evidence for sub-chorionic hemorrhage.     Intramural uterine fibroid 3.8 x 3.7 x 3.7 cm     RIGHT OVARY: Normal.  LEFT OVARY: Normal.                                                                      IMPRESSION:   1.  Single intrauterine gestation with cardiac activity at 8 weeks 4 days, with EDC of 2/25/2026.       Component      Latest Ref Rng 7/13/2025  12:25 PM   WBC      4.0 - 11.0 10e3/uL 10.5    RBC Count      3.80 - 5.20 10e6/uL 4.36    Hemoglobin      11.7 - 15.7 g/dL 13.2    Hematocrit      35.0 - 47.0 % 38.5    MCV      78 - 100 fL 88    MCH      26.5 - 33.0 pg 30.3    MCHC      31.5 - 36.5 g/dL 34.3    RDW      10.0 - 15.0 % 12.2    Platelet Count      150 - 450 10e3/uL 252    % Neutrophils      % 77    %  Lymphocytes      % 15    % Monocytes      % 6    % Eosinophils      % 0    % Basophils      % 0    % Immature Granulocytes      % 0    NRBC/W      <1 /100 0    Absolute Neutrophil      1.6 - 8.3 10e3/uL 8.2    Absolute Lymphocytes      0.8 - 5.3 10e3/uL 1.6    Absolute Monocytes      0.0 - 1.3 10e3/uL 0.7    Absolute Eosinophils      0.0 - 0.7 10e3/uL 0.0    Absolute Basophils      0.0 - 0.2 10e3/uL 0.0    Absolute Immature Granulocytes      <=0.4 10e3/uL 0.0    Absolute NRBCs      10e3/uL 0.0    Sodium      135 - 145 mmol/L 136    Potassium      3.4 - 5.3 mmol/L 3.9    Carbon Dioxide (CO2)      22 - 29 mmol/L 20 (L)    Anion Gap      7 - 15 mmol/L 12    Urea Nitrogen      6.0 - 20.0 mg/dL 6.1    Creatinine      0.51 - 0.95 mg/dL 0.41 (L)    GFR Estimate      >60 mL/min/1.73m2 >90    Calcium      8.8 - 10.4 mg/dL 8.9    Chloride      98 - 107 mmol/L 104    Glucose      70 - 99 mg/dL 117 (H)    Alkaline Phosphatase      40 - 150 U/L 81    AST      0 - 45 U/L 15    ALT      0 - 50 U/L 10    Protein Total      6.4 - 8.3 g/dL 6.7    Albumin      3.5 - 5.2 g/dL 3.6    Bilirubin Total      <=1.2 mg/dL 0.8    Color Urine      Colorless, Straw, Light Yellow, Yellow  Straw    Appearance Urine      Clear  Clear    Glucose Urine      Negative mg/dL Negative    Bilirubin Urine      Negative  Negative    Ketones Urine      Negative mg/dL Negative    Specific Gravity Urine      1.003 - 1.035  1.009    Blood Urine      Negative  Trace !    pH Urine      5.0 - 7.0  7.0    Protein Albumin Urine      Negative mg/dL Negative    Urobilinogen mg/dL      Normal mg/dL Normal    Nitrite Urine      Negative  Negative    Leukocyte Esterase Urine      Negative  Negative    RBC Urine      <=2 /HPF 0    WBC Urine      <=5 /HPF 1    Squamous Epithelial /HPF Urine      <=1 /HPF 1    hCG Quantitative      <5 mIU/mL 3,370 (H)       Legend:  (L) Low  (H) High  ! Abnormal       hCG Quantitative   Latest Ref Rng <5 mIU/mL   6/21/2025  5:17 ,966  (H)    7/13/2025  12:25 PM 3,370 (H)       Legend:  (H) High  Signed Electronically by: Michelle Harris MD

## 2025-07-27 LAB
ABO + RH BLD: NORMAL
BLD GP AB SCN SERPL QL: NEGATIVE
SPECIMEN EXP DATE BLD: NORMAL

## 2025-07-28 ENCOUNTER — OFFICE VISIT (OUTPATIENT)
Dept: OBGYN | Facility: CLINIC | Age: 35
End: 2025-07-28
Payer: COMMERCIAL

## 2025-07-28 ENCOUNTER — ANCILLARY PROCEDURE (OUTPATIENT)
Dept: ULTRASOUND IMAGING | Facility: CLINIC | Age: 35
End: 2025-07-28
Payer: COMMERCIAL

## 2025-07-28 VITALS
SYSTOLIC BLOOD PRESSURE: 119 MMHG | DIASTOLIC BLOOD PRESSURE: 80 MMHG | WEIGHT: 172.1 LBS | OXYGEN SATURATION: 99 % | BODY MASS INDEX: 27.78 KG/M2 | HEART RATE: 80 BPM

## 2025-07-28 DIAGNOSIS — O02.1 MISSED AB: ICD-10-CM

## 2025-07-28 DIAGNOSIS — O02.1 MISSED AB: Primary | ICD-10-CM

## 2025-07-28 LAB
ERYTHROCYTE [DISTWIDTH] IN BLOOD BY AUTOMATED COUNT: 11.8 % (ref 10–15)
HCG INTACT+B SERPL-ACNC: 305 MIU/ML
HCT VFR BLD AUTO: 38.7 % (ref 35–47)
HGB BLD-MCNC: 12.7 G/DL (ref 11.7–15.7)
MCH RBC QN AUTO: 29.5 PG (ref 26.5–33)
MCHC RBC AUTO-ENTMCNC: 32.8 G/DL (ref 31.5–36.5)
MCV RBC AUTO: 90 FL (ref 78–100)
PLATELET # BLD AUTO: 268 10E3/UL (ref 150–450)
RBC # BLD AUTO: 4.31 10E6/UL (ref 3.8–5.2)
WBC # BLD AUTO: 7.9 10E3/UL (ref 4–11)

## 2025-07-28 PROCEDURE — 3074F SYST BP LT 130 MM HG: CPT

## 2025-07-28 PROCEDURE — 76830 TRANSVAGINAL US NON-OB: CPT | Performed by: OBSTETRICS & GYNECOLOGY

## 2025-07-28 PROCEDURE — 36415 COLL VENOUS BLD VENIPUNCTURE: CPT

## 2025-07-28 PROCEDURE — 99213 OFFICE O/P EST LOW 20 MIN: CPT

## 2025-07-28 PROCEDURE — 85027 COMPLETE CBC AUTOMATED: CPT

## 2025-07-28 PROCEDURE — 86901 BLOOD TYPING SEROLOGIC RH(D): CPT

## 2025-07-28 PROCEDURE — 84702 CHORIONIC GONADOTROPIN TEST: CPT

## 2025-07-28 PROCEDURE — 86850 RBC ANTIBODY SCREEN: CPT

## 2025-07-28 PROCEDURE — 3079F DIAST BP 80-89 MM HG: CPT

## 2025-07-28 PROCEDURE — 86900 BLOOD TYPING SEROLOGIC ABO: CPT

## 2025-07-28 NOTE — PROGRESS NOTES
SUBJECTIVE: Heike is a 35 year old  who presents today to discuss miscarriage. She had an ultrasound 25 which was diagnostic for pregnancy loss. Dr. Harris saw her on 7/15/25 and they discussed medication management, d&c, expectant management. Heike opted to do expectant management. Had cramping previous week (, 7/15)  then last week  had bleeding. Had a day last week (Wednesday or Thursday) where she had about a 1/4 cup of bleeding followed by some small clots. She has been having some spotting, especially when wiping. Bleeding now is brown, scant amount. Heike wanted to be seen today due to concerns that she has not passed all the pregnancy tissue.    OBJECTIVE:  /80   Pulse 80   Wt 78.1 kg (172 lb 1.6 oz)   LMP 2025   SpO2 99%   BMI 27.78 kg/m    Exam:  Constitutional: healthy, alert, and no distress  Neurologic: Gait normal. Reflexes normal and symmetric. Sensation grossly WNL.  Psychiatric: mentation appears normal and affect normal/bright    ASSESSMENT/PLAN:  Per encounter diagnoses and orders.    (O02.1) Missed ab  (primary encounter diagnosis)  Comment: Did not pass a significant amount of tissue or blood, wants to make sure she has passed the pregnancy as she would like to become pregnant with a viable pregnancy in the future.  Plan: hCG Quantitative Pregnancy, US Pelvic Complete         with Transvaginal, CBC with platelets, ABO/Rh         type and screen          -US later today at 1540.  -Discussed the chances that the pregnancy could still be viable. Discussed there is not chance of this pregnancy ever being viable unfortunately.    Will make plan later today after ultrasound.    SUSAN Esqueda CNP

## 2025-07-29 ENCOUNTER — RESULTS FOLLOW-UP (OUTPATIENT)
Dept: OBGYN | Facility: CLINIC | Age: 35
End: 2025-07-29

## 2025-07-29 ENCOUNTER — VIRTUAL VISIT (OUTPATIENT)
Dept: OBGYN | Facility: CLINIC | Age: 35
End: 2025-07-29
Payer: COMMERCIAL

## 2025-07-29 DIAGNOSIS — O03.4 RETAINED PRODUCTS OF CONCEPTION AFTER MISCARRIAGE: Primary | ICD-10-CM

## 2025-07-29 PROCEDURE — 98015 SYNCH AUDIO-ONLY EST HIGH 40: CPT | Performed by: OBSTETRICS & GYNECOLOGY

## 2025-07-29 NOTE — TELEPHONE ENCOUNTER
RN called ptn with an  again to see when she can meet for virtual visit with SL today.     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     Joaquina MARMOLEJO RN   [facility] Triage RN

## 2025-07-29 NOTE — PROGRESS NOTES
Heike is a 35 year old who is being evaluated via a billable video visit.    What phone number would you like to be contacted at?    Contact Information  [phone]     How would you like to obtain your AVS? [place]    Phone-Visit Details    Type of service:  Phone Visit   Appt was scheduled as a video visit but due for need for , decision was made to do a phone visit.   Length of phone call: 41 minutes  Start phone call: 330p  End phone call: 412p  Originating Location (pt. Location): Home  Distant Location (provider location):  On-site  Platform used for Video Visit: Telephone  Signed Electronically by: Bernadette Vernon MD          Assessment & Plan   35 year old  with evidence of retained products of conception and/or hematometra after miscarriage on  at 9w2d. She is no longer having any bleeding but ultrasound  showed thickened heterogeneous lining with flow measuring 37mm.     Retained products of conception after miscarriage  Discussed my recommendation for procedural management of retained products/hematometra after miscarriage. Patient agrees to procedure.   Risks include bleeding, infection, uterine perforation, cervical laceration, incomplete procedure, injury to other organs, VTE, risks of anesthesia.    - Case Request: DILATION AND CURETTAGE, UTERUS, USING SUCTION, WITH ULTRASOUND GUIDANCE    Due to language barrier, an  was present during the history-taking and subsequent discussion (and for part of the physical exam) with this patient.      Bernadette Vernon MD       Subjective   Hekie is a 35 year old, presenting for the following health issues:  Miscarriage follow up      HPI    Diagnosed with miscarriage   Saw Dr. Harris 7/15, decided expectant management.  That week she had strong cramps and heavy bleeding.  Then bleeding was light and spotting, then brown  No bleeding for past 4 days.    OB History    Para Term  AB Living   3 2 2 0 1  "2   SAB IAB Ectopic Multiple Live Births   1 0 0 0 2      # Outcome Date GA Lbr Nicolás/2nd Weight Sex Type Anes PTL Lv   3 Term 24 39w6d  3.56 kg (7 lb 13.6 oz) F CS-LTranv Spinal N CRISTAL      Name: Clyde Moe      Apgar1: 7  Apgar5: 9   2 Term 20 40w0d 05:55 / 02:08 3.16 kg (6 lb 15.5 oz) M Vag-Spont EPI N CRISTAL      Name: BRADLEY,MALE-SHERIE      Apgar1: 8  Apgar5: 9   1 SAB              Past Surgical History:   Procedure Laterality Date     SECTION N/A 2024    Procedure:  section;  Surgeon: Delores Wooten MD;  Location:  L+D     Past Medical History:   Diagnosis Date    History of gestational diabetes     History of pre-eclampsia     Migraines            Objective           Vitals:  No vitals were obtained today due to virtual visit.    Physical Exam   GENERAL: alert and no distress  SKIN: Visible skin clear. No significant rash, abnormal pigmentation or lesions.  NEURO: Cranial nerves grossly intact.  Mentation and speech appropriate for age.  PSYCH: Appropriate affect, tone, and pace of words  Estimated body mass index is 27.78 kg/m  as calculated from the following:    Height as of 25: 1.676 m (5' 6\").    Weight as of 25: 78.1 kg (172 lb 1.6 oz).      US Transvaginal Non OB  Result Date: 2025  Gynecological Ultrasound Report Pelvic U/S - Transvaginal  [facility] Referring Provider: SUSAN Esqueda CNP Sonographer:  Trang Elliott, Registered Diagnostic Medical Sonographer, [facility] Indication: Follow up MAB for completion LMP: Patient's last menstrual period was 2025.  Gynecological Ultrasonography: Uterus: anteverted. Contour is heterogeneous. There is a  scar observed. Size: 10.72 x 6.02 x 6.89 cm Endometrium: Thickness Total 37.6mm Findings: Heterogeneous and complex cavity with flow observed on the posterior wall of the cavity.  Right Ovary:  Not visualized Left Ovary:  Not visualized Cul " de Sac Free Fluid: No free fluid Technique: Transvaginal Imaging performed 3D imaging performed  Impression: The endometrium is thickened and heterogeneous, measuring 38mm. The endometrial cavity appears complex with flow observed on the posterior wall of the cavity. This is concerning for retained products of conception. There is a small fluid collection at the base of the  scar which may represent a possible niche. Bilateral ovaries are not visualized. Bernadette Vernon MD       Ultrasound 2025  UTERUS: Contains a fundal gestational sac with a single embryo. Cervix is closed and measures 3.8 cm in length.  CRL: Measures 2.5 cm, equals 9 weeks 2 days.  RATE OF CARDIAC ACTIVITY: No fetal cardiac activity is detected.  AMNIOTIC FLUID: Normal.  PLACENTA: Forming superiorly. No evidence for sub-chorionic hemorrhage.     RIGHT OVARY: 2.4 x 2.0 x 1.5 cm. Normal appearance.  LEFT OVARY: 2.2 x 1.6 x 1.2 cm. Normal appearance.     No visualized adnexal mass or free pelvic fluid.                                                                      IMPRESSION:      1.  Findings diagnostic of pregnancy failure

## 2025-07-30 ENCOUNTER — TELEPHONE (OUTPATIENT)
Dept: OBGYN | Facility: CLINIC | Age: 35
End: 2025-07-30
Payer: COMMERCIAL

## 2025-07-30 DIAGNOSIS — O03.4 RETAINED PRODUCTS OF CONCEPTION AFTER MISCARRIAGE: Primary | ICD-10-CM

## 2025-07-30 DIAGNOSIS — Z01.818 PRE-OP EXAM: ICD-10-CM

## 2025-07-30 NOTE — TELEPHONE ENCOUNTER
Type of surgery: gyn  Location of surgery: [facility] OR  Date and time of surgery: 08/07/2025 1PM  Surgeon: Dr. Hailey Fraga  Pre-Op Appt Date: surgeon  Post-Op Appt Date: 2 weeks, will schedule later   Packet sent out: Yes  Pre-cert/Authorization completed:  Not Applicable  Date: 07/30/2025

## 2025-08-03 LAB
ABO + RH BLD: NORMAL
BLD GP AB SCN SERPL QL: NEGATIVE
SPECIMEN EXP DATE BLD: NORMAL

## 2025-08-04 ENCOUNTER — LAB (OUTPATIENT)
Dept: LAB | Facility: CLINIC | Age: 35
End: 2025-08-04
Payer: COMMERCIAL

## 2025-08-04 DIAGNOSIS — O03.4 RETAINED PRODUCTS OF CONCEPTION AFTER MISCARRIAGE: ICD-10-CM

## 2025-08-04 DIAGNOSIS — Z01.818 PRE-OP EXAM: ICD-10-CM

## 2025-08-04 LAB
ERYTHROCYTE [DISTWIDTH] IN BLOOD BY AUTOMATED COUNT: 11.8 % (ref 10–15)
HCT VFR BLD AUTO: 38.9 % (ref 35–47)
HGB BLD-MCNC: 12.9 G/DL (ref 11.7–15.7)
MCH RBC QN AUTO: 29.6 PG (ref 26.5–33)
MCHC RBC AUTO-ENTMCNC: 33.2 G/DL (ref 31.5–36.5)
MCV RBC AUTO: 89 FL (ref 78–100)
PLATELET # BLD AUTO: 251 10E3/UL (ref 150–450)
RBC # BLD AUTO: 4.36 10E6/UL (ref 3.8–5.2)
WBC # BLD AUTO: 7.3 10E3/UL (ref 4–11)

## 2025-08-04 PROCEDURE — 36415 COLL VENOUS BLD VENIPUNCTURE: CPT

## 2025-08-04 PROCEDURE — 86900 BLOOD TYPING SEROLOGIC ABO: CPT

## 2025-08-04 PROCEDURE — 85027 COMPLETE CBC AUTOMATED: CPT

## 2025-08-04 PROCEDURE — 86901 BLOOD TYPING SEROLOGIC RH(D): CPT

## 2025-08-04 PROCEDURE — 86850 RBC ANTIBODY SCREEN: CPT

## 2025-08-05 ENCOUNTER — LAB (OUTPATIENT)
Dept: LAB | Facility: CLINIC | Age: 35
End: 2025-08-05
Payer: COMMERCIAL

## 2025-08-05 ENCOUNTER — TELEPHONE (OUTPATIENT)
Dept: OBGYN | Facility: CLINIC | Age: 35
End: 2025-08-05

## 2025-08-05 DIAGNOSIS — O03.4 RETAINED PRODUCTS OF CONCEPTION AFTER MISCARRIAGE: ICD-10-CM

## 2025-08-05 DIAGNOSIS — O03.9 MISCARRIAGE: ICD-10-CM

## 2025-08-05 DIAGNOSIS — O03.9 MISCARRIAGE: Primary | ICD-10-CM

## 2025-08-05 LAB — HCG INTACT+B SERPL-ACNC: 288 MIU/ML

## 2025-08-05 PROCEDURE — 84702 CHORIONIC GONADOTROPIN TEST: CPT

## 2025-08-05 PROCEDURE — 36415 COLL VENOUS BLD VENIPUNCTURE: CPT

## 2025-08-07 ENCOUNTER — OFFICE VISIT (OUTPATIENT)
Dept: INTERPRETER SERVICES | Facility: CLINIC | Age: 35
End: 2025-08-07

## 2025-08-07 ENCOUNTER — HOSPITAL ENCOUNTER (OUTPATIENT)
Facility: CLINIC | Age: 35
Discharge: HOME | End: 2025-08-07
Attending: OBSTETRICS & GYNECOLOGY | Admitting: OBSTETRICS & GYNECOLOGY
Payer: COMMERCIAL

## 2025-08-07 VITALS
HEIGHT: 66 IN | BODY MASS INDEX: 27.14 KG/M2 | DIASTOLIC BLOOD PRESSURE: 73 MMHG | WEIGHT: 168.87 LBS | OXYGEN SATURATION: 99 % | RESPIRATION RATE: 20 BRPM | HEART RATE: 70 BPM | TEMPERATURE: 97.7 F | SYSTOLIC BLOOD PRESSURE: 117 MMHG

## 2025-08-07 PROCEDURE — T1013 SIGN LANG/ORAL INTERPRETER: HCPCS

## 2025-08-07 PROCEDURE — 250N000011 HC RX IP 250 OP 636: Performed by: OBSTETRICS & GYNECOLOGY

## 2025-08-07 PROCEDURE — 76998 US GUIDE INTRAOP: CPT | Mod: 26 | Performed by: OBSTETRICS & GYNECOLOGY

## 2025-08-07 PROCEDURE — 258N000003 HC RX IP 258 OP 636: Performed by: OBSTETRICS & GYNECOLOGY

## 2025-08-07 PROCEDURE — 250N000009 HC RX 250: Performed by: OBSTETRICS & GYNECOLOGY

## 2025-08-07 PROCEDURE — 59812 TREATMENT OF MISCARRIAGE: CPT | Performed by: OBSTETRICS & GYNECOLOGY

## 2025-08-07 PROCEDURE — 272N000001 HC OR GENERAL SUPPLY STERILE: Performed by: OBSTETRICS & GYNECOLOGY

## 2025-08-07 PROCEDURE — 710N000012 HC RECOVERY PHASE 2, PER MINUTE: Performed by: OBSTETRICS & GYNECOLOGY

## 2025-08-07 PROCEDURE — 250N000013 HC RX MED GY IP 250 OP 250 PS 637: Performed by: STUDENT IN AN ORGANIZED HEALTH CARE EDUCATION/TRAINING PROGRAM

## 2025-08-07 PROCEDURE — 370N000017 HC ANESTHESIA TECHNICAL FEE, PER MIN: Performed by: OBSTETRICS & GYNECOLOGY

## 2025-08-07 PROCEDURE — 360N000076 HC SURGERY LEVEL 3, PER MIN: Performed by: OBSTETRICS & GYNECOLOGY

## 2025-08-07 PROCEDURE — 999N000141 HC STATISTIC PRE-PROCEDURE NURSING ASSESSMENT: Performed by: OBSTETRICS & GYNECOLOGY

## 2025-08-07 RX ORDER — ACETAMINOPHEN 325 MG/1
975 TABLET ORAL ONCE
Status: DISCONTINUED | OUTPATIENT
Start: 2025-08-07 | End: 2025-08-07 | Stop reason: HOSPADM

## 2025-08-07 RX ORDER — HYDROMORPHONE HYDROCHLORIDE 1 MG/ML
0.4 INJECTION, SOLUTION INTRAMUSCULAR; INTRAVENOUS; SUBCUTANEOUS EVERY 5 MIN PRN
Status: DISCONTINUED | OUTPATIENT
Start: 2025-08-07 | End: 2025-08-07 | Stop reason: HOSPADM

## 2025-08-07 RX ORDER — HYDROMORPHONE HYDROCHLORIDE 1 MG/ML
0.2 INJECTION, SOLUTION INTRAMUSCULAR; INTRAVENOUS; SUBCUTANEOUS EVERY 5 MIN PRN
Status: DISCONTINUED | OUTPATIENT
Start: 2025-08-07 | End: 2025-08-07 | Stop reason: HOSPADM

## 2025-08-07 RX ORDER — ONDANSETRON 4 MG/1
4 TABLET, ORALLY DISINTEGRATING ORAL ONCE
Status: COMPLETED | OUTPATIENT
Start: 2025-08-07 | End: 2025-08-07

## 2025-08-07 RX ORDER — SODIUM CHLORIDE, SODIUM LACTATE, POTASSIUM CHLORIDE, CALCIUM CHLORIDE 600; 310; 30; 20 MG/100ML; MG/100ML; MG/100ML; MG/100ML
INJECTION, SOLUTION INTRAVENOUS CONTINUOUS
Status: DISCONTINUED | OUTPATIENT
Start: 2025-08-07 | End: 2025-08-07 | Stop reason: HOSPADM

## 2025-08-07 RX ORDER — FENTANYL CITRATE 50 UG/ML
25 INJECTION, SOLUTION INTRAMUSCULAR; INTRAVENOUS EVERY 5 MIN PRN
Status: DISCONTINUED | OUTPATIENT
Start: 2025-08-07 | End: 2025-08-07 | Stop reason: HOSPADM

## 2025-08-07 RX ORDER — ONDANSETRON 2 MG/ML
4 INJECTION INTRAMUSCULAR; INTRAVENOUS EVERY 30 MIN PRN
Status: DISCONTINUED | OUTPATIENT
Start: 2025-08-07 | End: 2025-08-07 | Stop reason: HOSPADM

## 2025-08-07 RX ORDER — NALOXONE HYDROCHLORIDE 0.4 MG/ML
0.1 INJECTION, SOLUTION INTRAMUSCULAR; INTRAVENOUS; SUBCUTANEOUS
Status: DISCONTINUED | OUTPATIENT
Start: 2025-08-07 | End: 2025-08-07 | Stop reason: HOSPADM

## 2025-08-07 RX ORDER — ONDANSETRON 2 MG/ML
4 INJECTION INTRAMUSCULAR; INTRAVENOUS ONCE
Status: COMPLETED | OUTPATIENT
Start: 2025-08-07 | End: 2025-08-07

## 2025-08-07 RX ORDER — ACETAMINOPHEN 325 MG/1
975 TABLET ORAL ONCE
Status: COMPLETED | OUTPATIENT
Start: 2025-08-07 | End: 2025-08-07

## 2025-08-07 RX ORDER — HALOPERIDOL 5 MG/ML
1 INJECTION INTRAMUSCULAR
Status: DISCONTINUED | OUTPATIENT
Start: 2025-08-07 | End: 2025-08-07 | Stop reason: HOSPADM

## 2025-08-07 RX ORDER — FENTANYL CITRATE 50 UG/ML
50 INJECTION, SOLUTION INTRAMUSCULAR; INTRAVENOUS EVERY 5 MIN PRN
Status: DISCONTINUED | OUTPATIENT
Start: 2025-08-07 | End: 2025-08-07 | Stop reason: HOSPADM

## 2025-08-07 RX ORDER — LIDOCAINE 40 MG/G
CREAM TOPICAL
Status: DISCONTINUED | OUTPATIENT
Start: 2025-08-07 | End: 2025-08-07 | Stop reason: HOSPADM

## 2025-08-07 RX ORDER — ONDANSETRON 4 MG/1
4 TABLET, ORALLY DISINTEGRATING ORAL EVERY 30 MIN PRN
Status: DISCONTINUED | OUTPATIENT
Start: 2025-08-07 | End: 2025-08-07 | Stop reason: HOSPADM

## 2025-08-07 RX ADMIN — ACETAMINOPHEN 975 MG: 325 TABLET ORAL at 12:42

## 2025-08-07 RX ADMIN — DOXYCYCLINE 200 MG: 100 INJECTION, POWDER, LYOPHILIZED, FOR SOLUTION INTRAVENOUS at 12:55

## 2025-08-07 RX ADMIN — ONDANSETRON 4 MG: 4 TABLET, ORALLY DISINTEGRATING ORAL at 12:42

## 2025-08-07 ASSESSMENT — ACTIVITIES OF DAILY LIVING (ADL)
ADLS_ACUITY_SCORE: 48
ADLS_ACUITY_SCORE: 49

## 2025-08-19 ENCOUNTER — TELEPHONE (OUTPATIENT)
Dept: OBGYN | Facility: CLINIC | Age: 35
End: 2025-08-19
Payer: COMMERCIAL

## 2025-08-20 LAB
PATH REPORT.COMMENTS IMP SPEC: NORMAL
PATH REPORT.COMMENTS IMP SPEC: NORMAL
PATH REPORT.FINAL DX SPEC: NORMAL
PATH REPORT.GROSS SPEC: NORMAL
PATH REPORT.MICROSCOPIC SPEC OTHER STN: NORMAL
PATH REPORT.RELEVANT HX SPEC: NORMAL
PHOTO IMAGE: NORMAL

## 2025-08-21 ENCOUNTER — RESULTS FOLLOW-UP (OUTPATIENT)
Dept: OBGYN | Facility: CLINIC | Age: 35
End: 2025-08-21
Payer: COMMERCIAL

## 2025-08-21 DIAGNOSIS — N93.9 ABNORMAL UTERINE BLEEDING (AUB): Primary | ICD-10-CM

## 2025-08-22 ENCOUNTER — ANCILLARY PROCEDURE (OUTPATIENT)
Dept: ULTRASOUND IMAGING | Facility: CLINIC | Age: 35
End: 2025-08-22
Attending: OBSTETRICS & GYNECOLOGY
Payer: COMMERCIAL

## 2025-08-22 ENCOUNTER — OFFICE VISIT (OUTPATIENT)
Dept: OBGYN | Facility: CLINIC | Age: 35
End: 2025-08-22
Payer: COMMERCIAL

## 2025-08-22 VITALS
HEIGHT: 66 IN | TEMPERATURE: 97 F | OXYGEN SATURATION: 98 % | DIASTOLIC BLOOD PRESSURE: 84 MMHG | SYSTOLIC BLOOD PRESSURE: 119 MMHG | BODY MASS INDEX: 27.19 KG/M2 | WEIGHT: 169.2 LBS | HEART RATE: 70 BPM

## 2025-08-22 DIAGNOSIS — R10.2 PELVIC PAIN IN FEMALE: Primary | ICD-10-CM

## 2025-08-22 DIAGNOSIS — O03.4 RETAINED PRODUCTS OF CONCEPTION AFTER MISCARRIAGE: ICD-10-CM

## 2025-08-22 DIAGNOSIS — N71.9 ENDOMETRITIS: ICD-10-CM

## 2025-08-22 LAB
ALBUMIN UR-MCNC: NEGATIVE MG/DL
APPEARANCE UR: CLEAR
BACTERIA #/AREA URNS HPF: ABNORMAL /HPF
BASOPHILS # BLD AUTO: <0.04 10E3/UL (ref 0–0.2)
BASOPHILS NFR BLD AUTO: 0.3 %
BILIRUB UR QL STRIP: NEGATIVE
CLUE CELLS: ABNORMAL
COLOR UR AUTO: YELLOW
EOSINOPHIL # BLD AUTO: 0.07 10E3/UL (ref 0–0.7)
EOSINOPHIL NFR BLD AUTO: 0.9 %
ERYTHROCYTE [DISTWIDTH] IN BLOOD BY AUTOMATED COUNT: 11.8 % (ref 10–15)
GLUCOSE UR STRIP-MCNC: NEGATIVE MG/DL
HCT VFR BLD AUTO: 41.1 % (ref 35–47)
HGB BLD-MCNC: 13.4 G/DL (ref 11.7–15.7)
HGB UR QL STRIP: ABNORMAL
IMM GRANULOCYTES # BLD: <0.04 10E3/UL
IMM GRANULOCYTES NFR BLD: 0.1 %
KETONES UR STRIP-MCNC: NEGATIVE MG/DL
LEUKOCYTE ESTERASE UR QL STRIP: NEGATIVE
LYMPHOCYTES # BLD AUTO: 2.04 10E3/UL (ref 0.8–5.3)
LYMPHOCYTES NFR BLD AUTO: 26.3 %
MCH RBC QN AUTO: 28.9 PG (ref 26.5–33)
MCHC RBC AUTO-ENTMCNC: 32.6 G/DL (ref 31.5–36.5)
MCV RBC AUTO: 88.6 FL (ref 78–100)
MONOCYTES # BLD AUTO: 0.64 10E3/UL (ref 0–1.3)
MONOCYTES NFR BLD AUTO: 8.3 %
NEUTROPHILS # BLD AUTO: 4.97 10E3/UL (ref 1.6–8.3)
NEUTROPHILS NFR BLD AUTO: 64.1 %
NITRATE UR QL: NEGATIVE
PH UR STRIP: 5.5 [PH] (ref 5–7)
PLATELET # BLD AUTO: 270 10E3/UL (ref 150–450)
RBC # BLD AUTO: 4.64 10E6/UL (ref 3.8–5.2)
RBC #/AREA URNS AUTO: ABNORMAL /HPF
SP GR UR STRIP: 1.02 (ref 1–1.03)
SQUAMOUS #/AREA URNS AUTO: ABNORMAL /LPF
TRICHOMONAS, WET PREP: ABNORMAL
UROBILINOGEN UR STRIP-ACNC: 0.2 E.U./DL
WBC # BLD AUTO: 7.75 10E3/UL (ref 4–11)
WBC #/AREA URNS AUTO: ABNORMAL /HPF
WBC'S/HIGH POWER FIELD, WET PREP: ABNORMAL
YEAST, WET PREP: ABNORMAL

## 2025-08-22 PROCEDURE — 76830 TRANSVAGINAL US NON-OB: CPT

## 2025-08-22 PROCEDURE — 81001 URINALYSIS AUTO W/SCOPE: CPT | Performed by: OBSTETRICS & GYNECOLOGY

## 2025-08-22 PROCEDURE — 85025 COMPLETE CBC W/AUTO DIFF WBC: CPT | Performed by: OBSTETRICS & GYNECOLOGY

## 2025-08-22 PROCEDURE — 36415 COLL VENOUS BLD VENIPUNCTURE: CPT | Performed by: OBSTETRICS & GYNECOLOGY

## 2025-08-22 PROCEDURE — 87210 SMEAR WET MOUNT SALINE/INK: CPT | Performed by: OBSTETRICS & GYNECOLOGY

## 2025-08-22 PROCEDURE — 76830 TRANSVAGINAL US NON-OB: CPT | Mod: 26 | Performed by: OBSTETRICS & GYNECOLOGY

## 2025-08-22 RX ORDER — IBUPROFEN 600 MG/1
600 TABLET, FILM COATED ORAL EVERY 6 HOURS PRN
Qty: 30 TABLET | Refills: 0 | Status: SHIPPED | OUTPATIENT
Start: 2025-08-22 | End: 2025-09-05

## 2025-08-22 RX ORDER — DOXYCYCLINE 100 MG/1
100 CAPSULE ORAL 2 TIMES DAILY
Qty: 20 CAPSULE | Refills: 0 | Status: SHIPPED | OUTPATIENT
Start: 2025-08-22 | End: 2025-09-01

## 2025-08-22 RX ORDER — MISOPROSTOL 200 UG/1
800 TABLET ORAL ONCE
Qty: 4 TABLET | Refills: 0 | Status: SHIPPED | OUTPATIENT
Start: 2025-08-22 | End: 2025-08-22

## 2025-08-27 ENCOUNTER — TELEPHONE (OUTPATIENT)
Dept: OBGYN | Facility: CLINIC | Age: 35
End: 2025-08-27

## (undated) DEVICE — SU VICRYL 3-0 CTX 36" UND J980H

## (undated) DEVICE — SUCTION VACUUM CANISTER STANDARD W/LID&CAPS 003987-901

## (undated) DEVICE — STRAP POSITIONING 60X31" BODY KNEE KBS 01

## (undated) DEVICE — GLOVE PROTEXIS BLUE W/NEU-THERA 7.0  2D73EB70

## (undated) DEVICE — SOL WATER IRRIG 1000ML BOTTLE 07139-09

## (undated) DEVICE — SOL ADH LIQUID BENZOIN SWAB 0.6ML C1544

## (undated) DEVICE — STOCKING SLEEVE COMPRESSION CALF LG

## (undated) DEVICE — SPECIMEN TRAP VACUUM SUCTION SAFETOUCH 003853-902

## (undated) DEVICE — PACK C-SECTION LF PL15OTA83B

## (undated) DEVICE — TUBING SUCTION VACUUM COLLECTION 6FTX3/8" 022310

## (undated) DEVICE — SUCTION CANNULA UTERINE 08MM CVD 022108-10

## (undated) DEVICE — PREP DYNA-HEX 4% CHG SCRUB 4OZ BOTTLE MDS098710

## (undated) DEVICE — GLOVE ESTEEM POWDER FREE SMT 6.5  2D72PT65

## (undated) DEVICE — Device

## (undated) DEVICE — LINEN GOWN X4 5410

## (undated) DEVICE — DRSG STERI STRIP 1/4X3" R1541

## (undated) DEVICE — ESU PENCIL W/SMOKE EVAC NEPTUNE STRYKER 0703-046-000

## (undated) DEVICE — CATH TRAY FOLEY 16FR BARDEX W/DRAIN BAG STATLOCK 300316A

## (undated) DEVICE — UNDERPAD 36X30 PREMIERPRO MAX ABS NS LF 676111

## (undated) DEVICE — STRAP KNEE/BODY 31143004

## (undated) DEVICE — LINEN TOWEL PACK X5 5464

## (undated) DEVICE — DECANTER FLUID L3 IN TRANSFER STRL LF DYNJDEC03

## (undated) DEVICE — SOL NACL 0.9% IRRIG 1000ML BOTTLE 07138-09

## (undated) DEVICE — SU VICRYL 0 CT-1 36" J346H

## (undated) DEVICE — SOLUTION IRRIGATION 0.9% NACL 1000ML BOTTLE R5200-01

## (undated) DEVICE — SOLUTION WATER 1000ML BOTTLE R5000-01

## (undated) DEVICE — SOLIDIFIER (USE FOR UP TO 1500CC) MSOLID1500

## (undated) DEVICE — SU MONOCRYL 4-0 PS-2 18" UND Y496G

## (undated) DEVICE — PREP CHLORAPREP 26ML TINTED ORANGE  260815

## (undated) DEVICE — SUCTION CANNULA UTERINE 07MM CVD 022107-10

## (undated) RX ORDER — EPHEDRINE SULFATE 50 MG/ML
INJECTION, SOLUTION INTRAMUSCULAR; INTRAVENOUS; SUBCUTANEOUS
Status: DISPENSED
Start: 2024-08-13

## (undated) RX ORDER — FENTANYL CITRATE 50 UG/ML
INJECTION, SOLUTION INTRAMUSCULAR; INTRAVENOUS
Status: DISPENSED
Start: 2024-08-12

## (undated) RX ORDER — VASOPRESSIN 20 [USP'U]/ML
INJECTION, SOLUTION INTRAVENOUS
Status: DISPENSED
Start: 2025-08-07

## (undated) RX ORDER — ACETAMINOPHEN 325 MG/1
TABLET ORAL
Status: DISPENSED
Start: 2025-08-07

## (undated) RX ORDER — FENTANYL CITRATE 50 UG/ML
INJECTION, SOLUTION INTRAMUSCULAR; INTRAVENOUS
Status: DISPENSED
Start: 2025-08-07

## (undated) RX ORDER — LIDOCAINE HYDROCHLORIDE 10 MG/ML
INJECTION, SOLUTION EPIDURAL; INFILTRATION; INTRACAUDAL; PERINEURAL
Status: DISPENSED
Start: 2025-08-07

## (undated) RX ORDER — LABETALOL HYDROCHLORIDE 5 MG/ML
INJECTION, SOLUTION INTRAVENOUS
Status: DISPENSED
Start: 2024-08-13

## (undated) RX ORDER — ONDANSETRON 4 MG/1
TABLET, ORALLY DISINTEGRATING ORAL
Status: DISPENSED
Start: 2025-08-07

## (undated) RX ORDER — LIDOCAINE HYDROCHLORIDE AND EPINEPHRINE 10; 10 MG/ML; UG/ML
INJECTION, SOLUTION INFILTRATION; PERINEURAL
Status: DISPENSED
Start: 2025-08-07

## (undated) RX ORDER — MORPHINE SULFATE 1 MG/ML
INJECTION, SOLUTION EPIDURAL; INTRATHECAL; INTRAVENOUS
Status: DISPENSED
Start: 2024-08-12

## (undated) RX ORDER — LIDOCAINE HYDROCHLORIDE AND EPINEPHRINE 10; 10 MG/ML; UG/ML
INJECTION, SOLUTION INFILTRATION; PERINEURAL
Status: DISPENSED

## (undated) RX ORDER — LIDOCAINE HYDROCHLORIDE 10 MG/ML
INJECTION, SOLUTION EPIDURAL; INFILTRATION; INTRACAUDAL; PERINEURAL
Status: DISPENSED
Start: 2019-02-04

## (undated) RX ORDER — LIDOCAINE HYDROCHLORIDE 10 MG/ML
INJECTION, SOLUTION EPIDURAL; INFILTRATION; INTRACAUDAL; PERINEURAL
Status: DISPENSED
Start: 2019-07-11